# Patient Record
Sex: FEMALE | Race: WHITE | NOT HISPANIC OR LATINO | Employment: OTHER | URBAN - NONMETROPOLITAN AREA
[De-identification: names, ages, dates, MRNs, and addresses within clinical notes are randomized per-mention and may not be internally consistent; named-entity substitution may affect disease eponyms.]

---

## 2021-08-05 ENCOUNTER — APPOINTMENT (EMERGENCY)
Dept: RADIOLOGY | Facility: HOSPITAL | Age: 86
DRG: 689 | End: 2021-08-05
Payer: COMMERCIAL

## 2021-08-05 ENCOUNTER — HOSPITAL ENCOUNTER (INPATIENT)
Facility: HOSPITAL | Age: 86
LOS: 6 days | Discharge: HOME HEALTH | DRG: 689 | End: 2021-08-11
Attending: EMERGENCY MEDICINE | Admitting: INTERNAL MEDICINE
Payer: COMMERCIAL

## 2021-08-05 ENCOUNTER — APPOINTMENT (EMERGENCY)
Dept: CT IMAGING | Facility: HOSPITAL | Age: 86
DRG: 689 | End: 2021-08-05
Payer: COMMERCIAL

## 2021-08-05 DIAGNOSIS — N39.0 UTI (URINARY TRACT INFECTION): ICD-10-CM

## 2021-08-05 DIAGNOSIS — E43 SEVERE PROTEIN-CALORIE MALNUTRITION (HCC): ICD-10-CM

## 2021-08-05 DIAGNOSIS — G20 PARKINSON'S DISEASE (HCC): ICD-10-CM

## 2021-08-05 DIAGNOSIS — K59.01 SLOW TRANSIT CONSTIPATION: ICD-10-CM

## 2021-08-05 DIAGNOSIS — I48.91 NEW ONSET ATRIAL FIBRILLATION (HCC): ICD-10-CM

## 2021-08-05 DIAGNOSIS — R11.10 INTRACTABLE VOMITING: ICD-10-CM

## 2021-08-05 DIAGNOSIS — R53.1 GENERALIZED WEAKNESS: Primary | ICD-10-CM

## 2021-08-05 DIAGNOSIS — E44.1 MILD PROTEIN-CALORIE MALNUTRITION (HCC): ICD-10-CM

## 2021-08-05 DIAGNOSIS — I48.91 ATRIAL FIBRILLATION WITH RVR (HCC): ICD-10-CM

## 2021-08-05 PROBLEM — N30.01 ACUTE CYSTITIS WITH HEMATURIA: Status: ACTIVE | Noted: 2021-08-05

## 2021-08-05 PROBLEM — Z95.2 HISTORY OF TRANSCATHETER AORTIC VALVE REPLACEMENT (TAVR): Status: ACTIVE | Noted: 2021-08-05

## 2021-08-05 PROBLEM — D64.9 CHRONIC ANEMIA: Status: ACTIVE | Noted: 2021-08-05

## 2021-08-05 PROBLEM — K44.9 HIATAL HERNIA: Status: ACTIVE | Noted: 2021-08-05

## 2021-08-05 PROBLEM — E87.1 HYPONATREMIA: Status: ACTIVE | Noted: 2021-08-05

## 2021-08-05 LAB
ALBUMIN SERPL BCP-MCNC: 3.9 G/DL (ref 3.5–5)
ALP SERPL-CCNC: 119 U/L (ref 46–116)
ALT SERPL W P-5'-P-CCNC: 9 U/L (ref 12–78)
ANION GAP SERPL CALCULATED.3IONS-SCNC: 7 MMOL/L (ref 4–13)
APTT PPP: 28 SECONDS (ref 23–37)
AST SERPL W P-5'-P-CCNC: 50 U/L (ref 5–45)
BACTERIA UR QL AUTO: ABNORMAL /HPF
BASOPHILS # BLD AUTO: 0.03 THOUSANDS/ΜL (ref 0–0.1)
BASOPHILS NFR BLD AUTO: 1 % (ref 0–1)
BILIRUB SERPL-MCNC: 1.95 MG/DL (ref 0.2–1)
BILIRUB UR QL STRIP: ABNORMAL
BUN SERPL-MCNC: 15 MG/DL (ref 5–25)
CALCIUM SERPL-MCNC: 8.6 MG/DL (ref 8.3–10.1)
CHLORIDE SERPL-SCNC: 100 MMOL/L (ref 100–108)
CLARITY UR: ABNORMAL
CO2 SERPL-SCNC: 27 MMOL/L (ref 21–32)
COLOR UR: ABNORMAL
CREAT SERPL-MCNC: 1.05 MG/DL (ref 0.6–1.3)
EOSINOPHIL # BLD AUTO: 0.05 THOUSAND/ΜL (ref 0–0.61)
EOSINOPHIL NFR BLD AUTO: 1 % (ref 0–6)
ERYTHROCYTE [DISTWIDTH] IN BLOOD BY AUTOMATED COUNT: 21.2 % (ref 11.6–15.1)
GFR SERPL CREATININE-BSD FRML MDRD: 48 ML/MIN/1.73SQ M
GLUCOSE SERPL-MCNC: 87 MG/DL (ref 65–140)
GLUCOSE UR STRIP-MCNC: NEGATIVE MG/DL
HCT VFR BLD AUTO: 28.4 % (ref 34.8–46.1)
HGB BLD-MCNC: 9.1 G/DL (ref 11.5–15.4)
HGB UR QL STRIP.AUTO: ABNORMAL
HYALINE CASTS #/AREA URNS LPF: ABNORMAL /LPF
IMM GRANULOCYTES # BLD AUTO: 0.04 THOUSAND/UL (ref 0–0.2)
IMM GRANULOCYTES NFR BLD AUTO: 1 % (ref 0–2)
INR PPP: 1.01 (ref 0.84–1.19)
KETONES UR STRIP-MCNC: ABNORMAL MG/DL
LACTATE SERPL-SCNC: 1 MMOL/L (ref 0.5–2)
LEUKOCYTE ESTERASE UR QL STRIP: ABNORMAL
LYMPHOCYTES # BLD AUTO: 0.66 THOUSANDS/ΜL (ref 0.6–4.47)
LYMPHOCYTES NFR BLD AUTO: 11 % (ref 14–44)
MAGNESIUM SERPL-MCNC: 2.2 MG/DL (ref 1.6–2.6)
MCH RBC QN AUTO: 31 PG (ref 26.8–34.3)
MCHC RBC AUTO-ENTMCNC: 32 G/DL (ref 31.4–37.4)
MCV RBC AUTO: 97 FL (ref 82–98)
MONOCYTES # BLD AUTO: 0.43 THOUSAND/ΜL (ref 0.17–1.22)
MONOCYTES NFR BLD AUTO: 7 % (ref 4–12)
NEUTROPHILS # BLD AUTO: 4.59 THOUSANDS/ΜL (ref 1.85–7.62)
NEUTS SEG NFR BLD AUTO: 79 % (ref 43–75)
NITRITE UR QL STRIP: NEGATIVE
NON-SQ EPI CELLS URNS QL MICRO: ABNORMAL /HPF
NRBC BLD AUTO-RTO: 0 /100 WBCS
PH UR STRIP.AUTO: 6 [PH]
PLATELET # BLD AUTO: 129 THOUSANDS/UL (ref 149–390)
POTASSIUM SERPL-SCNC: 4.3 MMOL/L (ref 3.5–5.3)
PROT SERPL-MCNC: 6.6 G/DL (ref 6.4–8.2)
PROT UR STRIP-MCNC: ABNORMAL MG/DL
PROTHROMBIN TIME: 13.1 SECONDS (ref 11.6–14.5)
RBC # BLD AUTO: 2.94 MILLION/UL (ref 3.81–5.12)
RBC #/AREA URNS AUTO: ABNORMAL /HPF
SODIUM SERPL-SCNC: 134 MMOL/L (ref 136–145)
SP GR UR STRIP.AUTO: 1.01 (ref 1–1.03)
TROPONIN I SERPL-MCNC: <0.02 NG/ML
UROBILINOGEN UR QL STRIP.AUTO: 0.2 E.U./DL
WBC # BLD AUTO: 5.8 THOUSAND/UL (ref 4.31–10.16)
WBC #/AREA URNS AUTO: ABNORMAL /HPF

## 2021-08-05 PROCEDURE — 96375 TX/PRO/DX INJ NEW DRUG ADDON: CPT

## 2021-08-05 PROCEDURE — 87040 BLOOD CULTURE FOR BACTERIA: CPT | Performed by: PHYSICIAN ASSISTANT

## 2021-08-05 PROCEDURE — 83605 ASSAY OF LACTIC ACID: CPT | Performed by: PHYSICIAN ASSISTANT

## 2021-08-05 PROCEDURE — 85025 COMPLETE CBC W/AUTO DIFF WBC: CPT | Performed by: PHYSICIAN ASSISTANT

## 2021-08-05 PROCEDURE — 84484 ASSAY OF TROPONIN QUANT: CPT | Performed by: PHYSICIAN ASSISTANT

## 2021-08-05 PROCEDURE — 85730 THROMBOPLASTIN TIME PARTIAL: CPT | Performed by: PHYSICIAN ASSISTANT

## 2021-08-05 PROCEDURE — 71045 X-RAY EXAM CHEST 1 VIEW: CPT

## 2021-08-05 PROCEDURE — 99223 1ST HOSP IP/OBS HIGH 75: CPT | Performed by: NURSE PRACTITIONER

## 2021-08-05 PROCEDURE — 99285 EMERGENCY DEPT VISIT HI MDM: CPT

## 2021-08-05 PROCEDURE — 74177 CT ABD & PELVIS W/CONTRAST: CPT

## 2021-08-05 PROCEDURE — 96365 THER/PROPH/DIAG IV INF INIT: CPT

## 2021-08-05 PROCEDURE — 80053 COMPREHEN METABOLIC PANEL: CPT | Performed by: PHYSICIAN ASSISTANT

## 2021-08-05 PROCEDURE — 85610 PROTHROMBIN TIME: CPT | Performed by: PHYSICIAN ASSISTANT

## 2021-08-05 PROCEDURE — 83735 ASSAY OF MAGNESIUM: CPT | Performed by: PHYSICIAN ASSISTANT

## 2021-08-05 PROCEDURE — 84145 PROCALCITONIN (PCT): CPT | Performed by: PHYSICIAN ASSISTANT

## 2021-08-05 PROCEDURE — 99285 EMERGENCY DEPT VISIT HI MDM: CPT | Performed by: PHYSICIAN ASSISTANT

## 2021-08-05 PROCEDURE — 36415 COLL VENOUS BLD VENIPUNCTURE: CPT | Performed by: PHYSICIAN ASSISTANT

## 2021-08-05 PROCEDURE — 81001 URINALYSIS AUTO W/SCOPE: CPT | Performed by: PHYSICIAN ASSISTANT

## 2021-08-05 PROCEDURE — 96361 HYDRATE IV INFUSION ADD-ON: CPT

## 2021-08-05 PROCEDURE — 87086 URINE CULTURE/COLONY COUNT: CPT | Performed by: PHYSICIAN ASSISTANT

## 2021-08-05 RX ORDER — ASPIRIN 81 MG/1
81 TABLET ORAL DAILY
Status: DISCONTINUED | OUTPATIENT
Start: 2021-08-06 | End: 2021-08-11 | Stop reason: HOSPADM

## 2021-08-05 RX ORDER — ATORVASTATIN CALCIUM 40 MG/1
80 TABLET, FILM COATED ORAL
Status: DISCONTINUED | OUTPATIENT
Start: 2021-08-06 | End: 2021-08-11 | Stop reason: HOSPADM

## 2021-08-05 RX ORDER — POLYETHYLENE GLYCOL 3350 17 G/17G
17 POWDER, FOR SOLUTION ORAL DAILY
COMMUNITY

## 2021-08-05 RX ORDER — SODIUM CHLORIDE 9 MG/ML
50 INJECTION, SOLUTION INTRAVENOUS CONTINUOUS
Status: DISCONTINUED | OUTPATIENT
Start: 2021-08-05 | End: 2021-08-10

## 2021-08-05 RX ORDER — ACETAMINOPHEN 325 MG/1
650 TABLET ORAL EVERY 6 HOURS PRN
Status: DISCONTINUED | OUTPATIENT
Start: 2021-08-05 | End: 2021-08-11 | Stop reason: HOSPADM

## 2021-08-05 RX ORDER — ONDANSETRON 2 MG/ML
4 INJECTION INTRAMUSCULAR; INTRAVENOUS EVERY 8 HOURS PRN
Status: DISCONTINUED | OUTPATIENT
Start: 2021-08-05 | End: 2021-08-07

## 2021-08-05 RX ORDER — MELATONIN
1000 DAILY
COMMUNITY
End: 2021-09-14 | Stop reason: HOSPADM

## 2021-08-05 RX ORDER — ATORVASTATIN CALCIUM 80 MG/1
80 TABLET, FILM COATED ORAL DAILY
COMMUNITY
End: 2021-09-14 | Stop reason: HOSPADM

## 2021-08-05 RX ORDER — SAFINAMIDE MESYLATE 100 MG/1
TABLET, FILM COATED ORAL
COMMUNITY
Start: 2021-03-16 | End: 2021-08-11 | Stop reason: HOSPADM

## 2021-08-05 RX ORDER — CEFEPIME HYDROCHLORIDE 2 G/50ML
2000 INJECTION, SOLUTION INTRAVENOUS ONCE
Status: COMPLETED | OUTPATIENT
Start: 2021-08-05 | End: 2021-08-05

## 2021-08-05 RX ORDER — LANOLIN ALCOHOL/MO/W.PET/CERES
1000 CREAM (GRAM) TOPICAL DAILY
COMMUNITY
End: 2021-09-14 | Stop reason: HOSPADM

## 2021-08-05 RX ORDER — PANTOPRAZOLE SODIUM 40 MG/1
40 TABLET, DELAYED RELEASE ORAL DAILY
COMMUNITY

## 2021-08-05 RX ORDER — SELEGILINE HYDROCHLORIDE 5 MG/1
5 TABLET ORAL 2 TIMES DAILY WITH MEALS
Status: DISCONTINUED | OUTPATIENT
Start: 2021-08-06 | End: 2021-08-06

## 2021-08-05 RX ORDER — CLOPIDOGREL BISULFATE 75 MG/1
75 TABLET ORAL
COMMUNITY
Start: 2021-07-05 | End: 2021-09-03

## 2021-08-05 RX ORDER — CARBIDOPA AND LEVODOPA 50; 200 MG/1; MG/1
1 TABLET, EXTENDED RELEASE ORAL 4 TIMES DAILY
Status: DISCONTINUED | OUTPATIENT
Start: 2021-08-06 | End: 2021-08-06

## 2021-08-05 RX ORDER — ASPIRIN 81 MG/1
81 TABLET ORAL DAILY
COMMUNITY

## 2021-08-05 RX ORDER — CEFEPIME HYDROCHLORIDE 1 G/50ML
1000 INJECTION, SOLUTION INTRAVENOUS EVERY 12 HOURS
Status: DISCONTINUED | OUTPATIENT
Start: 2021-08-06 | End: 2021-08-09

## 2021-08-05 RX ORDER — CARBIDOPA AND LEVODOPA 50; 200 MG/1; MG/1
1 TABLET, EXTENDED RELEASE ORAL
COMMUNITY

## 2021-08-05 RX ORDER — ONDANSETRON 2 MG/ML
4 INJECTION INTRAMUSCULAR; INTRAVENOUS ONCE
Status: COMPLETED | OUTPATIENT
Start: 2021-08-05 | End: 2021-08-05

## 2021-08-05 RX ORDER — PANTOPRAZOLE SODIUM 40 MG/1
40 TABLET, DELAYED RELEASE ORAL
Status: DISCONTINUED | OUTPATIENT
Start: 2021-08-06 | End: 2021-08-11 | Stop reason: HOSPADM

## 2021-08-05 RX ORDER — CEFEPIME HYDROCHLORIDE 2 G/50ML
2000 INJECTION, SOLUTION INTRAVENOUS ONCE
Status: DISCONTINUED | OUTPATIENT
Start: 2021-08-06 | End: 2021-08-05

## 2021-08-05 RX ORDER — HEPARIN SODIUM 5000 [USP'U]/ML
5000 INJECTION, SOLUTION INTRAVENOUS; SUBCUTANEOUS EVERY 8 HOURS SCHEDULED
Status: DISCONTINUED | OUTPATIENT
Start: 2021-08-05 | End: 2021-08-10

## 2021-08-05 RX ORDER — CLOPIDOGREL BISULFATE 75 MG/1
75 TABLET ORAL DAILY
Status: DISCONTINUED | OUTPATIENT
Start: 2021-08-06 | End: 2021-08-11 | Stop reason: HOSPADM

## 2021-08-05 RX ADMIN — HEPARIN SODIUM 5000 UNITS: 5000 INJECTION INTRAVENOUS; SUBCUTANEOUS at 22:40

## 2021-08-05 RX ADMIN — IOHEXOL 85 ML: 350 INJECTION, SOLUTION INTRAVENOUS at 19:11

## 2021-08-05 RX ADMIN — SODIUM CHLORIDE 1000 ML: 0.9 INJECTION, SOLUTION INTRAVENOUS at 18:26

## 2021-08-05 RX ADMIN — ONDANSETRON 4 MG: 2 INJECTION INTRAMUSCULAR; INTRAVENOUS at 22:40

## 2021-08-05 RX ADMIN — CEFEPIME HYDROCHLORIDE 2000 MG: 2 INJECTION, SOLUTION INTRAVENOUS at 18:31

## 2021-08-05 RX ADMIN — ONDANSETRON 4 MG: 2 INJECTION INTRAMUSCULAR; INTRAVENOUS at 18:29

## 2021-08-05 RX ADMIN — SODIUM CHLORIDE 50 ML/HR: 0.9 INJECTION, SOLUTION INTRAVENOUS at 22:46

## 2021-08-05 RX ADMIN — SODIUM CHLORIDE 1000 ML: 0.9 INJECTION, SOLUTION INTRAVENOUS at 19:20

## 2021-08-05 NOTE — ED PROVIDER NOTES
History  Chief Complaint   Patient presents with    Weakness - Generalized     Pt dx with UTI, was taking antibiotic but pt reports it made them vomit so they stopped taking it      The patient is an 51-year-old female with a past medical history of  GERD, Parkinson's disease, HTN,severe AS status post TAVR performed on 6/29 with subsequent development of right femoral artery occlusion on 7/2 on plavix, CAD, atrial flutter, hypercholesterolemia, who presents emergency department today due to increased generalized weakness, dysuria, nausea and vomiting  Patient was diagnosed with a urinary tract infection by [facility] in [place] on 07/21 and was placed on Ceftin  Patient states that she was to complete a 7 day course of this however followed up the next day with her primary care physician and still had urinary tract infection  She was then started on ciprofloxacin she was to complete 10 days of this however  This approximately 4 days ago because she was unable tolerate it causing nausea vomiting     She states she was given Zofran but was still having repetitive nausea /vomiting  Patient did return to that ER however waited 6 hours and was not seen therefore came here for further evaluation  She states she is having increased dysuria, frequency  She feels very weak overall, daughter escorted her here to the ER and feels that this is secondary to her dehydration and vomiting            Difficulty Urinating  Pain quality:  Burning  Pain severity:  Moderate  Onset quality:  Gradual  Timing:  Constant  Progression:  Worsening  Chronicity:  New  Recent urinary tract infections: yes    Relieved by:  Nothing  Worsened by:  Nothing  Ineffective treatments:  Antibiotics  Urinary symptoms: frequent urination    Urinary symptoms: no discolored urine, no foul-smelling urine, no hematuria and no bladder incontinence    Associated symptoms: abdominal pain, nausea and vomiting    Associated symptoms: no fever, no flank pain, no genital lesions and no vaginal discharge    Abdominal pain:     Location:  Suprapubic    Quality: burning      Severity:  Mild    Onset quality:  Gradual    Timing:  Constant    Progression:  Unchanged    Chronicity:  New  Nausea:     Severity:  Moderate    Onset quality:  Gradual    Timing:  Constant    Progression:  Waxing and waning  Vomiting:     Quality:  Undigested food and stomach contents    Severity:  Mild    Timing:  Constant    Progression:  Unchanged      None       Past Medical History:   Diagnosis Date    DVT (deep venous thrombosis) (HCC)     MI (myocardial infarction) ([facility] [address] )        Past Surgical History:   Procedure Laterality Date    BREAST SURGERY      HYSTERECTOMY      TUBAL LIGATION         History reviewed  No pertinent family history  I have reviewed and agree with the history as documented  E-Cigarette/Vaping    E-Cigarette Use Never User      E-Cigarette/Vaping Substances     Social History     Tobacco Use    Smoking status: Never Smoker    Smokeless tobacco: Never Used   Vaping Use    Vaping Use: Never used   Substance Use Topics    Alcohol use: Not Currently    Drug use: Not Currently       Review of Systems   Constitutional: Negative for chills and fever  HENT: Negative for ear pain and sore throat  Eyes: Negative for pain and visual disturbance  Respiratory: Negative for cough and shortness of breath  Cardiovascular: Negative for chest pain and palpitations  Gastrointestinal: Positive for abdominal pain, nausea and vomiting  Genitourinary: Positive for dysuria  Negative for flank pain, hematuria and vaginal discharge  Musculoskeletal: Negative for arthralgias and back pain  Skin: Negative for color change and rash  Neurological: Negative for seizures and syncope  All other systems reviewed and are negative  Physical Exam  Physical Exam  Vitals and nursing note reviewed  Constitutional:       General: She is not in acute distress  Appearance: She is well-developed  HENT:      Head: Normocephalic and atraumatic  Mouth/Throat:      Mouth: Mucous membranes are dry  Eyes:      Extraocular Movements: Extraocular movements intact  Conjunctiva/sclera: Conjunctivae normal       Pupils: Pupils are equal, round, and reactive to light  Cardiovascular:      Rate and Rhythm: Normal rate and regular rhythm  Pulses: Normal pulses  Heart sounds: No murmur heard  Pulmonary:      Effort: Pulmonary effort is normal  No respiratory distress  Breath sounds: Normal breath sounds  Abdominal:      Palpations: Abdomen is soft  Tenderness: There is abdominal tenderness  There is no right CVA tenderness or left CVA tenderness  Musculoskeletal:      Cervical back: Neck supple  Right lower leg: No edema  Left lower leg: No edema  Skin:     General: Skin is warm and dry  Capillary Refill: Capillary refill takes less than 2 seconds  Neurological:      General: No focal deficit present  Mental Status: She is alert and oriented to person, place, and time           Vital Signs  ED Triage Vitals [08/05/21 1801]   Temperature Pulse Respirations Blood Pressure SpO2   97 8 °F (36 6 °C) 75 20 154/73 100 %      Temp Source Heart Rate Source Patient Position - Orthostatic VS BP Location FiO2 (%)   Temporal Monitor -- Right arm --      Pain Score       3           Vitals:    08/05/21 1801   BP: 154/73   Pulse: 75         Visual Acuity      ED Medications  Medications   sodium chloride 0 9 % bolus 1,000 mL (1,000 mL Intravenous New Bag 8/5/21 1826)     Followed by   sodium chloride 0 9 % bolus 1,000 mL (1,000 mL Intravenous New Bag 8/5/21 1920)   cefepime (MAXIPIME) IVPB (premix in dextrose) 2,000 mg 50 mL (0 mg Intravenous Stopped 8/5/21 1906)   ondansetron (ZOFRAN) injection 4 mg (4 mg Intravenous Given 8/5/21 1829)   iohexol (OMNIPAQUE) 350 MG/ML injection (SINGLE-DOSE) 85 mL (85 mL Intravenous Given 8/5/21 1911) Diagnostic Studies  Results Reviewed     Procedure Component Value Units Date/Time    Urine Microscopic [[id number]]  (Abnormal) Collected: 08/05/21 1844    Lab Status: Final result Specimen: Urine, Clean Catch Updated: 08/05/21 1905     RBC, UA 0-5 /hpf      WBC, UA 10-20 /hpf      Epithelial Cells None Seen /hpf      Bacteria, UA Moderate /hpf      Hyaline Casts, UA 1-2 /lpf     Urine culture [[id number]] Collected: 08/05/21 1844    Lab Status: In process Specimen: Urine, Clean Catch Updated: 08/05/21 1905    UA w Reflex to Microscopic w Reflex to Culture [[id number]]  (Abnormal) Collected: 08/05/21 1844    Lab Status: Final result Specimen: Urine, Clean Catch Updated: 08/05/21 1853     Color, UA Lisy     Clarity, UA Cloudy     Specific Gravity, UA 1 015     pH, UA 6 0     Leukocytes, UA Trace     Nitrite, UA Negative     Protein, UA Trace mg/dl      Glucose, UA Negative mg/dl      Ketones, UA Trace mg/dl      Urobilinogen, UA 0 2 E U /dl      Bilirubin, UA Small     Blood, UA Trace-lysed    Troponin I [[id number]]  (Normal) Collected: 08/05/21 1822    Lab Status: Final result Specimen: Blood from Arm, Left Updated: 08/05/21 1851     Troponin I <0 02 ng/mL     Lactic acid [[id number]]  (Normal) Collected: 08/05/21 1822    Lab Status: Final result Specimen: Blood from Arm, Left Updated: 08/05/21 1849     LACTIC ACID 1 0 mmol/L     Narrative:      Result may be elevated if tourniquet was used during collection      Comprehensive metabolic panel [[id number]]  (Abnormal) Collected: 08/05/21 1822    Lab Status: Final result Specimen: Blood from Arm, Left Updated: 08/05/21 1847     Sodium 134 mmol/L      Potassium 4 3 mmol/L      Chloride 100 mmol/L      CO2 27 mmol/L      ANION GAP 7 mmol/L      BUN 15 mg/dL      Creatinine 1 05 mg/dL      Glucose 87 mg/dL      Calcium 8 6 mg/dL      AST 50 U/L      ALT 9 U/L      Alkaline Phosphatase 119 U/L      Total Protein 6 6 g/dL      Albumin 3 9 g/dL      Total Bilirubin 1 95 mg/dL      eGFR 48 ml/min/1 73sq m     Narrative:      [place] guidelines for Chronic Kidney Disease (CKD):     Stage 1 with normal or high GFR (GFR > 90 mL/min/1 73 square meters)    Stage 2 Mild CKD (GFR = 60-89 mL/min/1 73 square meters)    Stage 3A Moderate CKD (GFR = 45-59 mL/min/1 73 square meters)    Stage 3B Moderate CKD (GFR = 30-44 mL/min/1 73 square meters)    Stage 4 Severe CKD (GFR = 15-29 mL/min/1 73 square meters)    Stage 5 End Stage CKD (GFR <15 mL/min/1 73 square meters)  Note: GFR calculation is accurate only with a steady state creatinine    Magnesium [[id number]]  (Normal) Collected: 08/05/21 1822    Lab Status: Final result Specimen: Blood from Arm, Left Updated: 08/05/21 1847     Magnesium 2 2 mg/dL     Protime-INR [[id number]]  (Normal) Collected: 08/05/21 1822    Lab Status: Final result Specimen: Blood from Arm, Left Updated: 08/05/21 1842     Protime 13 1 seconds      INR 1 01    APTT [[id number]]  (Normal) Collected: 08/05/21 1822    Lab Status: Final result Specimen: Blood from Arm, Left Updated: 08/05/21 1842     PTT 28 seconds     CBC and differential [[id number]]  (Abnormal) Collected: 08/05/21 1822    Lab Status: Final result Specimen: Blood from Arm, Left Updated: 08/05/21 1830     WBC 5 80 Thousand/uL      RBC 2 94 Million/uL      Hemoglobin 9 1 g/dL      Hematocrit 28 4 %      MCV 97 fL      MCH 31 0 pg      MCHC 32 0 g/dL      RDW 21 2 %      Platelets 669 Thousands/uL      nRBC 0 /100 WBCs      Neutrophils Relative 79 %      Immat GRANS % 1 %      Lymphocytes Relative 11 %      Monocytes Relative 7 %      Eosinophils Relative 1 %      Basophils Relative 1 %      Neutrophils Absolute 4 59 Thousands/µL      Immature Grans Absolute 0 04 Thousand/uL      Lymphocytes Absolute 0 66 Thousands/µL      Monocytes Absolute 0 43 Thousand/µL      Eosinophils Absolute 0 05 Thousand/µL      Basophils Absolute 0 03 Thousands/µL     Procalcitonin with AM Reflex [[id number]] Collected: 08/05/21 1822    Lab Status: In process Specimen: Blood from Arm, Left Updated: 08/05/21 1826    Blood culture #1 [[id number]] Collected: 08/05/21 1822    Lab Status: In process Specimen: Blood from Arm, Left Updated: 08/05/21 1826    Blood culture #2 [[id number]] Collected: 08/05/21 1822    Lab Status: In process Specimen: Blood from Hand, Left Updated: 08/05/21 1826                 CT abdomen pelvis with contrast   Final Result by Jolanta Rodriguez MD (08/05 2011)   No acute inflammatory stranding   No bowel obstruction   There is small amount of free fluid in the pelvis  ,  Likely corresponds to abnormality described at CT from July 2, 2021 performed at [facility]   No abnormal bowel wall thickening, or pneumatosis or free air   Large hiatal hernia      No new intra-abdominal hematoma   I personally discussed this study with Laquita HUFF on 8/5/2021 at 8:10 PM                   Workstation performed: [id number]         XR chest portable    (Results Pending)              Procedures  Procedures         ED Course  ED Course as of Aug 05 2020   Thu Aug 05, 2021   2004 UA consistent with urinary tract infection  Culture was sent  2013 IMPRESSION:  No acute inflammatory stranding  No bowel obstruction  There is small amount of free fluid in the pelvis  ,  Likely corresponds to abnormality described at CT from July 2, 2021 performed at [facility]  No abnormal bowel wall thickening, or pneumatosis or free air  Large hiatal hernia     No new intra-abdominal hematoma  I personally discussed this study with Bud Aguilar on 8/5/2021 at 8:10 PM          2014 Reaching out to hospitalist service for admission secondary to persistent UTI despite several courses of antibiotics                                                MDM  Number of Diagnoses or Management Options  Generalized weakness  UTI (urinary tract infection)  Diagnosis management comments: Patient's lab work was relatively unremarkable UA consistent with infection  Patient's CT scan was unremarkable for any acute findings  Patient was given IV fluids and did have blood cultures obtained  Discussed with hospitalist service for admission secondary to persistent UTI symptoms and UTI despite several courses of antibiotics  Dr Dee Rosales accepted        Amount and/or Complexity of Data Reviewed  Clinical lab tests: ordered and reviewed  Tests in the radiology section of CPT®: ordered and reviewed  Decide to obtain previous medical records or to obtain history from someone other than the patient: yes  Review and summarize past medical records: yes  Discuss the patient with other providers: yes  Independent visualization of images, tracings, or specimens: yes    Risk of Complications, Morbidity, and/or Mortality  Presenting problems: moderate  Diagnostic procedures: moderate  Management options: moderate        Disposition  Final diagnoses:   Generalized weakness   UTI (urinary tract infection)     Time reflects when diagnosis was documented in both MDM as applicable and the Disposition within this note     Time User Action Codes Description Comment    8/5/2021  8:01 PM Rosemary Watts Add [R53 1] Generalized weakness     8/5/2021  8:01 PM Rosemary Watts Add [N39 0] UTI (urinary tract infection)       ED Disposition     ED Disposition Condition Date/Time Comment    Admit Stable Thu Aug 5, 2021  8:11 PM Case was discussed with Jese Wynn  and the patient's admission status was agreed to be Admission Status: inpatient status to the service of Dr Елена Bell           Follow-up Information    None         Patient's Medications    No medications on file     No discharge procedures on file      PDMP Review     None          ED Provider  Electronically Signed by           Dyan Terrazas PA-C  08/05/21 2020

## 2021-08-06 PROBLEM — D69.6 THROMBOCYTOPENIA (HCC): Status: ACTIVE | Noted: 2021-08-06

## 2021-08-06 PROBLEM — E43 SEVERE PROTEIN-CALORIE MALNUTRITION (HCC): Status: ACTIVE | Noted: 2021-08-06

## 2021-08-06 PROBLEM — N30.00 ACUTE CYSTITIS WITHOUT HEMATURIA: Status: ACTIVE | Noted: 2021-08-05

## 2021-08-06 LAB
ANION GAP SERPL CALCULATED.3IONS-SCNC: 9 MMOL/L (ref 4–13)
BUN SERPL-MCNC: 13 MG/DL (ref 5–25)
CALCIUM SERPL-MCNC: 7.6 MG/DL (ref 8.3–10.1)
CHLORIDE SERPL-SCNC: 106 MMOL/L (ref 100–108)
CO2 SERPL-SCNC: 25 MMOL/L (ref 21–32)
CREAT SERPL-MCNC: 0.84 MG/DL (ref 0.6–1.3)
ERYTHROCYTE [DISTWIDTH] IN BLOOD BY AUTOMATED COUNT: 21.7 % (ref 11.6–15.1)
GFR SERPL CREATININE-BSD FRML MDRD: 63 ML/MIN/1.73SQ M
GLUCOSE SERPL-MCNC: 69 MG/DL (ref 65–140)
HCT VFR BLD AUTO: 23.1 % (ref 34.8–46.1)
HGB BLD-MCNC: 7.5 G/DL (ref 11.5–15.4)
MCH RBC QN AUTO: 31.6 PG (ref 26.8–34.3)
MCHC RBC AUTO-ENTMCNC: 32.5 G/DL (ref 31.4–37.4)
MCV RBC AUTO: 98 FL (ref 82–98)
PLATELET # BLD AUTO: 90 THOUSANDS/UL (ref 149–390)
POTASSIUM SERPL-SCNC: 4 MMOL/L (ref 3.5–5.3)
PROCALCITONIN SERPL-MCNC: <0.05 NG/ML
PROCALCITONIN SERPL-MCNC: <0.05 NG/ML
RBC # BLD AUTO: 2.37 MILLION/UL (ref 3.81–5.12)
SODIUM SERPL-SCNC: 140 MMOL/L (ref 136–145)
WBC # BLD AUTO: 3.73 THOUSAND/UL (ref 4.31–10.16)

## 2021-08-06 PROCEDURE — 99232 SBSQ HOSP IP/OBS MODERATE 35: CPT | Performed by: INTERNAL MEDICINE

## 2021-08-06 PROCEDURE — 84145 PROCALCITONIN (PCT): CPT | Performed by: PHYSICIAN ASSISTANT

## 2021-08-06 PROCEDURE — 97163 PT EVAL HIGH COMPLEX 45 MIN: CPT

## 2021-08-06 PROCEDURE — 97167 OT EVAL HIGH COMPLEX 60 MIN: CPT

## 2021-08-06 PROCEDURE — 80048 BASIC METABOLIC PNL TOTAL CA: CPT | Performed by: NURSE PRACTITIONER

## 2021-08-06 PROCEDURE — 85027 COMPLETE CBC AUTOMATED: CPT | Performed by: NURSE PRACTITIONER

## 2021-08-06 PROCEDURE — 97116 GAIT TRAINING THERAPY: CPT

## 2021-08-06 RX ORDER — CARBIDOPA AND LEVODOPA 50; 200 MG/1; MG/1
1 TABLET, EXTENDED RELEASE ORAL 4 TIMES DAILY
Status: DISCONTINUED | OUTPATIENT
Start: 2021-08-06 | End: 2021-08-11 | Stop reason: HOSPADM

## 2021-08-06 RX ORDER — CARBIDOPA AND LEVODOPA 50; 200 MG/1; MG/1
1 TABLET, EXTENDED RELEASE ORAL
Status: DISCONTINUED | OUTPATIENT
Start: 2021-08-07 | End: 2021-08-11 | Stop reason: HOSPADM

## 2021-08-06 RX ORDER — SAFINAMIDE MESYLATE 100 MG/1
100 TABLET, FILM COATED ORAL DAILY
COMMUNITY
End: 2021-09-14 | Stop reason: HOSPADM

## 2021-08-06 RX ORDER — AMOXICILLIN 250 MG
1 CAPSULE ORAL
Status: DISCONTINUED | OUTPATIENT
Start: 2021-08-06 | End: 2021-08-11 | Stop reason: HOSPADM

## 2021-08-06 RX ADMIN — PANTOPRAZOLE SODIUM 40 MG: 40 TABLET, DELAYED RELEASE ORAL at 05:51

## 2021-08-06 RX ADMIN — CARBIDOPA AND LEVODOPA 1 TABLET: 50; 200 TABLET, EXTENDED RELEASE ORAL at 13:52

## 2021-08-06 RX ADMIN — CARBIDOPA AND LEVODOPA 1 TABLET: 50; 200 TABLET, EXTENDED RELEASE ORAL at 01:29

## 2021-08-06 RX ADMIN — SAFINAMIDE MESYLATE 100 MG: 100 TABLET, FILM COATED ORAL at 11:55

## 2021-08-06 RX ADMIN — CARBIDOPA AND LEVODOPA 1 TABLET: 50; 200 TABLET, EXTENDED RELEASE ORAL at 22:00

## 2021-08-06 RX ADMIN — HEPARIN SODIUM 5000 UNITS: 5000 INJECTION INTRAVENOUS; SUBCUTANEOUS at 22:00

## 2021-08-06 RX ADMIN — CLOPIDOGREL BISULFATE 75 MG: 75 TABLET ORAL at 08:00

## 2021-08-06 RX ADMIN — ONDANSETRON 4 MG: 2 INJECTION INTRAMUSCULAR; INTRAVENOUS at 11:58

## 2021-08-06 RX ADMIN — CEFEPIME HYDROCHLORIDE 1000 MG: 1 INJECTION, SOLUTION INTRAVENOUS at 05:51

## 2021-08-06 RX ADMIN — HEPARIN SODIUM 5000 UNITS: 5000 INJECTION INTRAVENOUS; SUBCUTANEOUS at 05:51

## 2021-08-06 RX ADMIN — SODIUM CHLORIDE 50 ML/HR: 0.9 INJECTION, SOLUTION INTRAVENOUS at 16:18

## 2021-08-06 RX ADMIN — CEFEPIME HYDROCHLORIDE 1000 MG: 1 INJECTION, SOLUTION INTRAVENOUS at 17:20

## 2021-08-06 RX ADMIN — HEPARIN SODIUM 5000 UNITS: 5000 INJECTION INTRAVENOUS; SUBCUTANEOUS at 13:52

## 2021-08-06 RX ADMIN — DOCUSATE SODIUM AND SENNOSIDES 1 TABLET: 8.6; 5 TABLET ORAL at 22:00

## 2021-08-06 RX ADMIN — SELEGILINE HYDROCHLORIDE 5 MG: 5 TABLET ORAL at 08:00

## 2021-08-06 RX ADMIN — ONDANSETRON 4 MG: 2 INJECTION INTRAMUSCULAR; INTRAVENOUS at 20:22

## 2021-08-06 RX ADMIN — ASPIRIN 81 MG: 81 TABLET, COATED ORAL at 08:00

## 2021-08-06 RX ADMIN — ATORVASTATIN CALCIUM 80 MG: 40 TABLET, FILM COATED ORAL at 17:20

## 2021-08-06 RX ADMIN — CARBIDOPA AND LEVODOPA 1 TABLET: 50; 200 TABLET, EXTENDED RELEASE ORAL at 18:11

## 2021-08-06 RX ADMIN — CARBIDOPA AND LEVODOPA 1 TABLET: 50; 200 TABLET, EXTENDED RELEASE ORAL at 06:00

## 2021-08-06 NOTE — PHYSICAL THERAPY NOTE
PHYSICAL THERAPY EVALUATION  NAME:  Shane Messer  DATE: 08/06/21    AGE:   80 y o  Mrn:   [id number]  ADMIT DX:  UTI (urinary tract infection) [N39 0]  Weakness [R53 1]  Generalized weakness [R53 1]    Past Medical History:   Diagnosis Date    DVT (deep venous thrombosis) (HCC)     MI (myocardial infarction) ([facility] [address] )      Length Of Stay: 1  Performed at least 2 patient identifiers during session: Name and Birthday  PHYSICAL THERAPY EVALUATION :      08/06/21 1115   PT Last Visit   PT Visit Date 08/06/21   Note Type   Note type Evaluation   Pain Assessment   Pain Assessment Tool 0-10   Pain Score No Pain   Home Living   Type of Home House  (1 JOSHUA)   Home Layout One level;Performs ADLs on one level; Able to live on main level with bedroom/bathroom   Bathroom Shower/Tub Tub/shower unit   Bathroom Toilet Standard   Bathroom Equipment Shower chair;Grab bars in shower;Grab bars around toilet   [address]  (doesn't use, but has it sitting in bathroom at sink to sit)   Additional Comments Reports living in a 1 SH with 1 JOSHUA and not using an AD PTA as she just kept her rollator in the bathroom to use to sit on while she "brushed my teeth"   Prior Function   Level of [place] Independent with ADLs and functional mobility   Lives With Alone   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)   ADL Assistance Independent   IADLs Independent   Falls in the last 6 months 0   Comments Reports being independent PTA without AD  independent for ADLs and IADLs  Restrictions/Precautions   Other Precautions Chair Alarm; Bed Alarm; Fall Risk;Multiple lines   General   Additional Pertinent History Pt is s/p TAVR on 6/21/2021 and is s/p Endarterectomy for right femoral occlusion 07/02/2021      Cognition   Orientation Level Oriented X4   Following Commands Follows one step commands without difficulty   RLE Assessment   RLE Assessment WFL  (3+/5)   LLE Assessment   LLE Assessment WFL  (3+/5)   Coordination   Movements are Fluid and Coordinated 1   Light Touch   RLE Light Touch Grossly intact   LLE Light Touch Grossly intact   Bed Mobility   Supine to Sit 6  Modified independent   Additional items Increased time required   Additional Comments HOB flat without bedrail  completed bed mobility modified independent with increased time   Transfers   Sit to Stand   (stand by assistance)   Additional items Increased time required;Verbal cues   Stand to Sit   (stand by assistance)   Additional items Increased time required;Verbal cues   Stand pivot 4  Minimal assistance   Additional items Assist x 1; Increased time required;Verbal cues   Additional Comments no AD  sit<>stand with stand by assistance with increased time and slight posterior lean  min cues for technique  spt without AD with minAx1 with pt reaching for external support and manaul cues for balance and wt shifting   Ambulation/Elevation   Gait pattern Narrow JUAN A; Excessively slow; Short stride   Gait Assistance 4  Minimal assist  (min to steadying assistance)   Additional items Assist x 1;Verbal cues   Assistive Device None   Distance 14'x1 without AD with min to steadying assistance with NBOS, decreased step length, foot clearance and pt reaching for external support  min cues for increased step length  Balance   Static Sitting Good   Dynamic Sitting Fair +   Static Standing Fair   Dynamic Standing Fair -   Ambulatory Poor +   Activity Tolerance   Activity Tolerance Patient limited by fatigue   Medical Staff Made Aware Kristin ANGELO   Nurse Made Aware RN, Tiff   Assessment   Prognosis Good   Problem List Decreased strength;Decreased endurance; Impaired balance;Decreased mobility; Decreased safety awareness   Barriers to Discharge Decreased caregiver support   Barriers to Discharge Comments lives alone, but her sons live on either side of her and can help prn     Goals   Patient Goals "Go home"   STG Expiration Date 08/16/21   PT Treatment Day 1   Plan   Treatment/Interventions Functional transfer training;LE strengthening/ROM; Elevations; Therapeutic exercise; Endurance training;Patient/family training;Equipment eval/education; Bed mobility;Gait training; Compensatory technique education;Spoke to nursing;Spoke to case management;OT   PT Frequency Other (Comment)  (3-5x/week)   Recommendation   PT Discharge Recommendation Home with home health rehabilitation   Equipment Recommended   (walker-pt has)   PT - OK to Discharge   (when medically cleared)   Additional Comments pt sitting in recliner chair at end of session with all needs within reach and posey alarm on   AM-PAC Basic Mobility Inpatient   Turning in Bed Without Bedrails 4   Lying on Back to Sitting on Edge of Flat Bed 4   Moving Bed to Chair 3   Standing Up From Chair 3   Walk in Room 3   Climb 3-5 Stairs 3   Basic Mobility Inpatient Raw Score 20   Basic Mobility Standardized Score 43 99     (Please find full objective findings from PT assessment regarding body systems outlined above)  Assessment: Pt is a 80 y o  female seen for PT evaluation s/p admission to [address] [place] [address] on 8/5/2021 with Acute cystitis with hematuria  Order placed for PT services  Upon evaluation: Pt is presenting with impaired functional mobility due to decreased strength, decreased endurance, impaired balance, gait deviations, decreased safety awareness and fall risk requiring stand by to minimal assistance for transfers and minimal to steadying assistance for ambulation with out AD   Pt's clinical presentation is currently unpredictable given the functional mobility deficits above, especially weakness, decreased endurance, gait deviations, decreased activity tolerance, decreased functional mobility tolerance and decreased safety awareness, coupled with fall risks as indicated by AM-PAC 6-Clicks: 83/52 as well as impaired balance, polypharmacy and decreased safety awareness and combined with medical complications of abnormal WBCs, abnormal sodium values, multiple readmissions, fear/retreat, need for input for mobility technique/safety and abnormal H and H with hemoglobin decreased from 9 1 yesterday to 7 5 this date, abnormal urinalysis, increased c/o nausea  Pt's PMHx and comorbidities that may affect physical performance and progress include: Parkinson's Disease, CAD and h/o PE, DVT, 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 and endarterectomy for right femoral occlusion 7/2/2021, acute cystitis, chronic anemia, h/o MI  Personal factors affecting pt at time of IE include: step(s) to enter environment, limited home support, advanced age, inability to perform IADLs, inability to perform ADLs, inability to navigate level surfaces without external assistance and inability to ambulate household distances  Pt will benefit from continued skilled PT services to address deficits as defined above and to maximize level of functional mobility to facilitate return toward PLOF and improved QOL  From PT/mobility standpoint, recommendation at time of d/c would be Home PT, home with family support and with walker pending progress in order to reduce fall risk and maximize pt's functional independence and consistency with mobility in order to facilitate return to PLOF  Recommend trial with walker next 1-2 sessions and ther ex next 1-2 sessions  The patient's AM-PAC Basic Mobility Inpatient Short Form Raw Score is 20, Standardized Score is 43 99  A standardized score greater than 42 9 suggests the patient may benefit from discharge to home  Please also refer to the recommendation of the Physical Therapist for safe discharge planning  Goals: Pt will: Perform bed mobility tasks with independent to reposition in bed and prepare for transfers  Pt will perform transfers with modified I to decrease burden of care and decrease risk for falls and prepare for ambulation   Pt will ambulate with LRAD for >/= 250' with  modified I  to decrease burden of care, decrease risk for falls, improve activity tolerance and improve gait quality and to access home environment  Pt will complete >/= 8 steps with with bilateral handrails with Supervision to improve activity tolerance and complete 1 step with LRAD modified independent to access home environment  Pt will demonstrate decreased fall risk as indicated by score of >/= 10/12 on 4 item DGI  Pt will increase B LE strength >/= 1/2 MMT grade to facilitate functional mobility  Jaz Robles, PT,DPT     PHYSICAL THERAPY TREATMENT NOTE  Time In:1127  Time PCB:5881  Total Time: 8'      S:  "I'd rather use a walker "  O:    Initiated use of RW  Sit<>stand with RW with stand by assistance with increased time and min verbal cues for hand placement as patient initially attempted to stand wth poor hand placement with lateral deviation to left requiring minAx1 and verbal cues for safety to return to sitting  With improved hand placement, completed with stand by assistance    Spt with RW with stand by assistance  Min cues for turning completely with RW prior to sitting  Dynamic standing balance without UE support with stand by assistance    Ambulated 110'x1 with RW with stand by assistance with slow fabiola, decreased step length, foot clearance and verbal cues for increased step length  4 Item Dynamic Gait Index with RW   2/3 Gait level surface  0/3 Change in gait speed  2/3 Gait with horizontal head turns  2/3 Gait with vertical head turns  6/12 total score (less than 10/12 indicates increased risk of falls in elderly)    Exercises    Side/Reps/sets   Heelslides    Glute Sets    Hip Abduction    Hip Adduction    Hip Flexion 1x10 AROm B/L   Knee AROM Short Arc Quad    Ankle Pumps 1x10 AROM B/L   Quad sets    Long Arc Quads 1x10 AROM B/L   Hamstring Stretch    Heel Cord Stretch    Education      Declined stair trial      A:  Pt requires minimal verbal instruction or tactile feedback to perform exercises with proper form and technique   She was able to ambulate with decreased assistance with use of RW compared to no AD  She was able to ambulate increased distance  However, patient is at an increased fall risk as indicated by score of 6/12 on 4 item DGI, unable to change gait speed and increased path deviation with head movements  She is limited by decreased balance, strength, endurance  She will continue to benefit from PT services to maximize LOF  P:  Recommend LE strengthening, dynamic standing balance, endurance training, transfer and gait training with LRAD and stairs for endurance       Yimi Hernandez, PT, DPT

## 2021-08-06 NOTE — ASSESSMENT & PLAN NOTE
· Chronic anemia hemoglobin; did have drop in hemoglobin  · Recheck in a m      Results from last 7 days   Lab Units 08/06/21  0549 08/05/21  1822   HEMOGLOBIN g/dL 7 5* 9 1*

## 2021-08-06 NOTE — ASSESSMENT & PLAN NOTE
· [facility] 6[facility]212021 [facility]  for treatment of acute on chronic diastolic heart failure  · Endarterectomy for right femoral occlusion 07/02/2021  · CT abdomen pelvis:  No new intra-abdominal hematoma  · Continue aspirin and Plavix  · Continue outpatient follow-up No

## 2021-08-06 NOTE — ASSESSMENT & PLAN NOTE
· Baseline weakness-left leg weakness  · Continue Sinemet  · Takes Xadago-this would be formulary substituted with Selegiline-per patient's daughter-she is okay with this for a few doses, but will bring in patient's home meds-she initially gave will was identified as Sinemet by pharmacy  · Supportive care  · Fall precautions

## 2021-08-06 NOTE — ASSESSMENT & PLAN NOTE
· Hemoglobin 9 1    This appears to be around her baseline  · Per record review, she received 2 units of PRBCs in mid July for unclear reason-hemoglobin was 10 2 at that time  · Daily CBC and trend hemoglobin

## 2021-08-06 NOTE — PLAN OF CARE
Problem: PHYSICAL THERAPY ADULT  Goal: Performs mobility at highest level of function for planned discharge setting  See evaluation for individualized goals  Description: Treatment/Interventions: Functional transfer training, LE strengthening/ROM, Elevations, Therapeutic exercise, Endurance training, Patient/family training, Equipment eval/education, Bed mobility, Gait training, Compensatory technique education, Spoke to nursing, Spoke to case management, OT  Equipment Recommended:  (walker-pt has)       See flowsheet documentation for full assessment, interventions and recommendations  3/3/4491 1390 by Seven Lo PT  Note: Prognosis: Good  Problem List: Decreased strength, Decreased endurance, Impaired balance, Decreased mobility, Decreased safety awareness   Pt requires minimal verbal instruction or tactile feedback to perform exercises with proper form and technique  She was able to ambulate with decreased assistance with use of RW compared to no AD  She was able to ambulate increased distance  However, patient is at an increased fall risk as indicated by score of 6/12 on 4 item DGI, unable to change gait speed and increased path deviation with head movements  She is limited by decreased balance, strength, endurance  She will continue to benefit from PT services to maximize LOF  Barriers to Discharge: Decreased caregiver support  Barriers to Discharge Comments: lives alone, but her sons live on either side of her and can help prn  PT Discharge Recommendation: Home with home health rehabilitation     PT - OK to Discharge:  (when medically cleared)    See flowsheet documentation for full assessment

## 2021-08-06 NOTE — ASSESSMENT & PLAN NOTE
· Acute UTI with generalized malaise  Could not tolerate ciprofloxacin as prescribed on outpatient with nausea/vomiting  · Follow-up on urine culture  Continue cefepime for now    · PT/OT recommending rehab

## 2021-08-06 NOTE — PLAN OF CARE
Problem: Potential for Falls  Goal: Patient will remain free of falls  Description: INTERVENTIONS:  - Educate patient/family on patient safety including physical limitations  - Instruct patient to call for assistance with activity   - Consult OT/PT to assist with strengthening/mobility   - Keep Call bell within reach  - Keep bed low and locked with side rails adjusted as appropriate  - Keep care items and personal belongings within reach  - Initiate and maintain comfort rounds  - Make Fall Risk Sign visible to staff  - Offer Toileting every 4 Hours, in advance of need  - Initiate/Maintain bed alarm  - Obtain necessary fall risk management equipment: bracelet and socks  - Apply yellow socks and bracelet for high fall risk patients  - Consider moving patient to room near nurses station  Outcome: Progressing     Problem: Prexisting or High Potential for Compromised Skin Integrity  Goal: Skin integrity is maintained or improved  Description: INTERVENTIONS:  - Identify patients at risk for skin breakdown  - Assess and monitor skin integrity  - Assess and monitor nutrition and hydration status  - Monitor labs   - Assess for incontinence   - Assist with mobility/ambulation  - Relieve pressure over bony prominences  - Avoid friction and shearing  - Provide appropriate hygiene as needed including keeping skin clean and dry  - Evaluate need for skin moisturizer/barrier cream  - Collaborate with interdisciplinary team   - Patient/family teaching     Outcome: Progressing     Problem: Nutrition/Hydration-ADULT  Goal: Nutrient/Hydration intake appropriate for improving, restoring or maintaining nutritional needs  Description: Monitor and assess patient's nutrition/hydration status for malnutrition  Collaborate with interdisciplinary team and initiate plan and interventions as ordered  Monitor patient's weight and dietary intake as ordered or per policy  Utilize nutrition screening tool and intervene as necessary   Determine patient's food preferences and provide high-protein, high-caloric foods as appropriate       INTERVENTIONS:  - Monitor oral intake, urinary output, labs, and treatment plans  - Assess nutrition and hydration status and recommend course of action  - Evaluate amount of meals eaten  - Allow adequate time for meals  - Recommend/ encourage appropriate diets, oral nutritional supplements, and vitamin/mineral supplements  - Order, calculate, and assess calorie counts as needed  - Assess need for intravenous fluids  - Provide specific nutrition/hydration education as appropriate  - Include patient/family/caregiver in decisions related to nutrition  Outcome: Progressing     Problem: PAIN - ADULT  Goal: Verbalizes/displays adequate comfort level or baseline comfort level  Description: Interventions:  - Encourage patient to monitor pain and request assistance  - Assess pain using appropriate pain scale  - Administer analgesics based on type and severity of pain and evaluate response  - Implement non-pharmacological measures as appropriate and evaluate response  - Consider cultural and social influences on pain and pain management  - Notify physician/advanced practitioner if interventions unsuccessful or patient reports new pain  Outcome: Progressing     Problem: INFECTION - ADULT  Goal: Absence or prevention of progression during hospitalization  Description: INTERVENTIONS:  - Assess and monitor for signs and symptoms of infection  - Monitor lab/diagnostic results  - Monitor all insertion sites  - Administer medications as ordered  - Instruct and encourage patient and family to use good hand hygiene technique  - Identify and instruct in appropriate isolation precautions for identified infection/condition  Outcome: Progressing     Problem: SAFETY ADULT  Goal: Patient will remain free of falls  Description: INTERVENTIONS:  - Educate patient/family on patient safety including physical limitations  - Instruct patient to call for assistance with activity   - Consult OT/PT to assist with strengthening/mobility   - Keep Call bell within reach  - Keep bed low and locked with side rails adjusted as appropriate  - Keep care items and personal belongings within reach  - Initiate and maintain comfort rounds  - Make Fall Risk Sign visible to staff  - Offer Toileting every 4 Hours, in advance of need  - Initiate/Maintain bed alarm  - Obtain necessary fall risk management equipment: bracelet and socks  - Apply yellow socks and bracelet for high fall risk patients  - Consider moving patient to room near nurses station  Outcome: Progressing  Goal: Maintain or return to baseline ADL function  Description: INTERVENTIONS:  -  Assess patient's ability to carry out ADLs; assess patient's baseline for ADL function and identify physical deficits which impact ability to perform ADLs (bathing, care of mouth/teeth, toileting, grooming, dressing, etc )  - Assess/evaluate cause of self-care deficits   - Assess range of motion  - Assess patient's mobility; develop plan if impaired  - Assess patient's need for assistive devices and provide as appropriate  - Encourage maximum independence but intervene and supervise when necessary  - Involve family in performance of ADLs  - Assess for home care needs following discharge   - Provide patient education as appropriate  Outcome: Progressing  Goal: Maintains/Returns to pre admission functional level  Description: INTERVENTIONS:  - Perform BMAT or MOVE assessment daily    - Set and communicate daily mobility goal to care team and patient/family/caregiver  - Collaborate with rehabilitation services on mobility goals if consulted  - Perform Range of Motion 4 times a day  - Reposition patient every 4 hours    - Dangle patient 3 times a day  - Stand patient 3 times a day  - Out of bed for toileting  - Record patient progress and toleration of activity level   Outcome: Progressing     Problem: DISCHARGE PLANNING  Goal: Discharge to home or other facility with appropriate resources  Description: INTERVENTIONS:  - Identify barriers to discharge w/patient and caregiver  - Arrange for needed discharge resources and transportation as appropriate  - Identify discharge learning needs (meds, wound care, etc )  - Arrange for interpretive services to assist at discharge as needed  - Refer to Case Management Department for coordinating discharge planning if the patient needs post-hospital services based on physician/advanced practitioner order or complex needs related to functional status, cognitive ability, or social support system  Outcome: Progressing     Problem: Knowledge Deficit  Goal: Patient/family/caregiver demonstrates understanding of disease process, treatment plan, medications, and discharge instructions  Description: Complete learning assessment and assess knowledge base    Interventions:  - Provide teaching at level of understanding  - Provide teaching via preferred learning methods  Outcome: Progressing     Problem: DISCHARGE PLANNING - CARE MANAGEMENT  Goal: Discharge to post-acute care or home with appropriate resources  Description: INTERVENTIONS:  - Conduct assessment to determine patient/family and health care team treatment goals, and need for post-acute services based on payer coverage, community resources, and patient preferences, and barriers to discharge  - Address psychosocial, clinical, and financial barriers to discharge as identified in assessment in conjunction with the patient/family and health care team  - Arrange appropriate level of post-acute services according to patient's   needs and preference and payer coverage in collaboration with the physician and health care team  - Communicate with and update the patient/family, physician, and health care team regarding progress on the discharge plan  - Arrange appropriate transportation to post-acute venues  Outcome: Progressing

## 2021-08-06 NOTE — PLAN OF CARE
Problem: OCCUPATIONAL THERAPY ADULT  Goal: Performs self-care activities at highest level of function for planned discharge setting  See evaluation for individualized goals  Description:      See flowsheet documentation for full assessment, interventions and recommendations  Note: Limitation: Decreased ADL status, Decreased endurance, Decreased self-care trans, Decreased high-level ADLs  Prognosis: Good  Assessment: Pt is a 80 y o  F, admitted to [address] [place] [address] 8/5/2021 d/t experiencing generalized weakness  Dx: acute cystitis with hematuria  Pt with PMHx impacting their performance during ADL tasks, including: DVT and MI  On evaluation, pt presented supine in bed and was A&Ox4  Pt reports living alone in a one-level home and performing ADL/IADL tasks @ I  Pt completing supine>sit EOB @ Mod I  UB ADLs @ supervision, after set-up  LB dressing @ supervision  Pt able to don socks, provided set-up, while seated at EOB  Pt maintaining good sitting balance while EOB  Pt completing STS from EOB @ SBA  Pt standing while maintaining fair balance with no AD  Toilet transfer @ steadying assist with RW  Toileting @ supervision  Grooming @ supervision  Pt able to wash hands at sink with supervision with 1UE support on RW  Pt's barriers to d/c include: decrease activity tolerance, decrease standing balance, decrease generalized strength, decrease activity engagement and decrease performance during functional transfers   Pt would benefit from continued acute OT services to address deficits as well as home health OT services upon d/c from hospital      OT Discharge Recommendation: Home with home health rehabilitation

## 2021-08-06 NOTE — ASSESSMENT & PLAN NOTE
· Presents with generalized weakness  · Diagnosed with urinary tract infection last week but could not tolerate Cipro due to vomiting  · Urinalysis today positive for leukocyte esterase, WBC, moderate bacteria  · CT abdomen pelvis:  No acute inflammatory stranding  No bowel obstruction  Small amount of free fluid in pelvis likely corresponding to abnormality described at CT from July 2, 2021 performed at [facility]    No abnormal bowel wall thickening, or pneumatosis or free air  · Urine culture is pending  · Cefepime was started in the ER-continue

## 2021-08-06 NOTE — ASSESSMENT & PLAN NOTE
Malnutrition Findings:   Adult Malnutrition type: Acute illness (related to condition as evidenced by < 50% energy intake > 5 days, severe muscle loss to pectoralis, deltoid and interroseous muscles  Severe fat loss to orbitals )  Adult Degree of Malnutrition: Other severe protein calorie malnutrition (Treated with: Dental soft, 2 gm Na diet  +Ensure compact BID  Consider ST evaluation  Recommend daily weights )  BMI Findings:  Adult BMI Classifications: Underweight < 18 5  Body mass index is 17 37 kg/m²

## 2021-08-06 NOTE — ASSESSMENT & PLAN NOTE
· CT abdomen pelvis:  Large hiatal hernia  · Head of bed up  · Continue Protonix  · Outpatient GI follow-up

## 2021-08-06 NOTE — CONSULTS
Consult received for malnutrition  Pt with reported poor intake since initiated on ABx, RN reports this was > 2 weeks now  Pt says she has been having N/V as well as swallowing difficulties with both liquids and solids  Said she had difficulties swallowing toast and cranberry juice at B this AM though unable to describe difficulties further  Noting wt decrease per chart review though nonsignificant  Physical exam noting severe muscle loss to pectoralis, deltoid and interroseous muscles  Severe fat loss to orbitals  Meets criteria for malnutrition  Consider ST evaluation given reported swallowing difficulties, will order dental soft diet which was agreeable by pt  Will d/c fat restriction given malnutrition  Will order ensure compact BID  Recommend daily weights  Will follow up

## 2021-08-06 NOTE — ASSESSMENT & PLAN NOTE
· Sodium 134  · She received 2 L normal saline in the ER  · Normal saline at 50 mL an hour  · Recheck metabolic panel and trend sodium

## 2021-08-06 NOTE — ASSESSMENT & PLAN NOTE
· Very mild thrombocytopenia likely secondary to acute infection    No evidence of bleeding    Results from last 7 days   Lab Units 08/06/21  0549 08/05/21  1822   PLATELETS Thousands/uL 90* 129*

## 2021-08-06 NOTE — ASSESSMENT & PLAN NOTE
Malnutrition Findings:           BMI Findings: Body mass index is 17 83 kg/m²       · As evidenced by BMI 17 6, insufficient protein calorie intake, weight loss, muscle wasting  · Nutrition consult

## 2021-08-06 NOTE — PLAN OF CARE
Problem: PHYSICAL THERAPY ADULT  Goal: Performs mobility at highest level of function for planned discharge setting  See evaluation for individualized goals  Description: Treatment/Interventions: Functional transfer training, LE strengthening/ROM, Elevations, Therapeutic exercise, Endurance training, Patient/family training, Equipment eval/education, Bed mobility, Gait training, Compensatory technique education, Spoke to nursing, Spoke to case management, OT  Equipment Recommended:  (walker-pt has)       See flowsheet documentation for full assessment, interventions and recommendations  Note: Prognosis: Good  Problem List: Decreased strength, Decreased endurance, Impaired balance, Decreased mobility, Decreased safety awareness  Assessment: Pt is a 80 y o  female seen for PT evaluation s/p admission to [facility] on 8/5/2021 with Acute cystitis with hematuria  Order placed for PT services  Upon evaluation: Pt is presenting with impaired functional mobility due to decreased strength, decreased endurance, impaired balance, gait deviations, decreased safety awareness and fall risk requiring stand by to minimal assistance for transfers and minimal to steadying assistance for ambulation with out AD  Pt's clinical presentation is currently unpredictable given the functional mobility deficits above, especially weakness, decreased endurance, gait deviations, decreased activity tolerance, decreased functional mobility tolerance and decreased safety awareness, coupled with fall risks as indicated by AM-PAC 6-Clicks: 00/81 as well as impaired balance, polypharmacy and decreased safety awareness and combined with medical complications of abnormal WBCs, abnormal sodium values, multiple readmissions, fear/retreat, need for input for mobility technique/safety and abnormal H and H with hemoglobin decreased from 9 1 yesterday to 7 5 this date, abnormal urinalysis, increased c/o nausea    Pt's PMHx and comorbidities that may affect physical performance and progress include: Parkinson's Disease, CAD and h/o PE, DVT, 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 and endarterectomy for right femoral occlusion 7/2/2021, acute cystitis, chronic anemia, h/o MI  Personal factors affecting pt at time of IE include: step(s) to enter environment, limited home support, advanced age, inability to perform IADLs, inability to perform ADLs, inability to navigate level surfaces without external assistance and inability to ambulate household distances  Pt will benefit from continued skilled PT services to address deficits as defined above and to maximize level of functional mobility to facilitate return toward PLOF and improved QOL  From PT/mobility standpoint, recommendation at time of d/c would be Home PT, home with family support and with walker pending progress in order to reduce fall risk and maximize pt's functional independence and consistency with mobility in order to facilitate return to PLOF  Recommend trial with walker next 1-2 sessions and ther ex next 1-2 sessions  Barriers to Discharge: Decreased caregiver support  Barriers to Discharge Comments: lives alone, but her sons live on either side of her and can help prn  PT Discharge Recommendation: Home with home health rehabilitation     PT - OK to Discharge:  (when medically cleared)    See flowsheet documentation for full assessment

## 2021-08-06 NOTE — OCCUPATIONAL THERAPY NOTE
Occupational Therapy Evaluation     Patient Name: Josefina CARMONA Date: 8/6/2021  Problem List  Principal Problem:    Acute cystitis with hematuria  Active Problems:    Chronic anemia    History of transcatheter aortic valve replacement (TAVR)    Hyponatremia    Parkinson's disease ([facility] )    Hiatal hernia    Mild protein-calorie malnutrition (HCC)    Thrombocytopenia (HCC)    Severe protein-calorie malnutrition ([facility] )    Past Medical History  Past Medical History:   Diagnosis Date    DVT (deep venous thrombosis) ([facility] )     MI (myocardial infarction) ([facility] )      Past Surgical History  Past Surgical History:   Procedure Laterality Date    BREAST SURGERY      HYSTERECTOMY      TUBAL LIGATION             08/06/21 1116   Note Type   Note type Evaluation   Restrictions/Precautions   Other Precautions Chair Alarm; Bed Alarm;Multiple lines; Fall Risk   Pain Assessment   Pain Assessment Tool 0-10   Pain Score No Pain   Home Living   Type of Home House  (1 JOSHUA)   Home Layout One level;Performs ADLs on one level; Able to live on main level with bedroom/bathroom   Bathroom Shower/Tub Tub/shower unit   Bathroom Toilet Standard   Bathroom Equipment Shower chair;Grab bars in shower;Grab bars around toilet   [facility]   Additional Comments Pt reports living alone in one-level home  No AD use @ baseline, but reports has a walker in her bathroom if needed  Prior Function   Level of [place] Independent with ADLs and functional mobility   Lives With Alone   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)   ADL Assistance Independent   IADLs Independent   Falls in the last 6 months 0   ADL   Where Assessed Edge of bed   Grooming Assistance 5  Supervision/Setup   Grooming Deficit Setup;Verbal cueing;Supervision/safety; Increased time to complete   UB Dressing Assistance 5  Supervision/Setup   UB Dressing Deficit Setup;Verbal cueing;Supervision/safety; Increased time to complete   LB Dressing Assistance 5  Supervision/Setup   LB Dressing Deficit Setup; Requires assistive device for steadying;Verbal cueing;Supervision/safety; Increased time to complete   Toileting Assistance  5  Supervision/Setup   Toileting Deficit Setup;Verbal cueing;Supervison/safety; Increased time to complete   Additional Comments UB ADLs @ supervision, provided set-up  LB ADLs @ supervision, provided set-up  Pt able to don socks while seated at EOB, provided set-up  Toileting @ supervision  Pt able to complet pericare and CM with supervision  Grooming @ supervision  Pt able to wash hands while standing at sink with supervision and 1UE support on RW  Bed Mobility   Supine to Sit 6  Modified independent   Additional items Increased time required   Additional Comments Pt presented seated supine in bed at beginning of session  Mod I to transfer from supine to sit  Transfers   Sit to Stand   (SBA)   Additional items Increased time required;Verbal cues   Stand to Sit   (SBA)   Additional items Increased time required;Verbal cues   Stand pivot   (SBA)   Additional items Increased time required;Verbal cues   Toilet transfer   (Steadying assist)   Additional items Increased time required;Verbal cues   Additional Comments Pt able to complete STS transfer from EOB with no AD @ SBA  Pt able to ambulate into bathroom onto toilet with SBA  Pt then given RW and was able to complete toilet transfer with steadying assist  Able to ambulate to sink to wash hands and to recliner chiar with RW and supervision  Seated in recliner chair at end of session  Chair alarm intact  Call bell within reach  All needs met at this time     Balance   Static Sitting Good   Dynamic Sitting Fair +   Static Standing Fair   Dynamic Standing Fair -   Activity Tolerance   Activity Tolerance Patient limited by fatigue   Medical Staff Made Aware Spoke with [place] QURESHI   Nurse Made Aware Spoke with RNVanessa Assessment   RUE Assessment WFL   LUE Assessment LUE Assessment WFL   Hand Function   Gross Motor Coordination Functional   Fine Motor Coordination Functional   Sensation   Light Touch No apparent deficits   Cognition   Overall Cognitive Status WFL   Arousal/Participation Alert; Responsive; Cooperative   Attention Within functional limits   Orientation Level Oriented X4   Memory Within functional limits   Following Commands Follows one step commands with increased time or repetition   Assessment   Limitation Decreased ADL status; Decreased endurance;Decreased self-care trans;Decreased high-level ADLs   Prognosis Good   Assessment Pt is a 80 y o  F, admitted to [address] [place] [address] 8/5/2021 d/t experiencing generalized weakness  Dx: acute cystitis with hematuria  Pt with PMHx impacting their performance during ADL tasks, including: DVT and MI  On evaluation, pt presented supine in bed and was A&Ox4  Pt reports living alone in a one-level home and performing ADL/IADL tasks @ I  Pt completing supine>sit EOB @ Mod I  UB ADLs @ supervision, after set-up  LB dressing @ supervision  Pt able to don socks, provided set-up, while seated at EOB  Pt maintaining good sitting balance while EOB  Pt completing STS from EOB @ SBA  Pt standing while maintaining fair balance with no AD  Toilet transfer @ steadying assist with RW  Toileting @ supervision  Grooming @ supervision  Pt able to wash hands at sink with supervision with 1UE support on RW  Pt's barriers to d/c include: decrease activity tolerance, decrease standing balance, decrease generalized strength, decrease activity engagement and decrease performance during functional transfers   Pt would benefit from continued acute OT services to address deficits as well as home health OT services upon d/c from hospital    Recommendation   OT Discharge Recommendation Home with home health rehabilitation   AM-PAC Daily Activity Inpatient   Lower Body Dressing 3   Bathing 3   Toileting 3   Upper Body Dressing 3   Grooming 3   Eating 4   Daily Activity Raw Score 19   Daily Activity Standardized Score (Calc for Raw Score >=11) 40 22   AM-PAC Applied Cognition Inpatient   Following a Speech/Presentation 4   Understanding Ordinary Conversation 4   Taking Medications 4   Remembering Where Things Are Placed or Put Away 4   Remembering List of 4-5 Errands 3   Taking Care of Complicated Tasks 3   Applied Cognition Raw Score 22   Applied Cognition Standardized Score 47 83     The patient's raw score on the AM-PAC Daily Activity inpatient short form is 19, standardized score is 40 22, greater than 39 4  Patients at this level are likely to benefit from DC to home  Please refer to the recommendation of the Occupational Therapist for safe DC planning  Pt goals to be met by 8/16/2021    1  Pt will demonstrate ability to complete grooming/hygiene tasks @ Mod I after set-up  2  Pt will demonstrate ability to complete supine<>sit @ I in order to increase safety and independence during ADL tasks  3  Pt will demonstrate ability to complete UB ADLs including washing/dressing @ Mod I in order to increase performance and participation during meaningful tasks  4  Pt will demonstrate ability to complete LB dressing @ Mod I in order to increase safety and independence during meaningful tasks  5  Pt will demonstrate ability to alfredo/doff socks/shoes while sitting EOB @ Mod I in order to increase safety and independence during meaningful tasks  6  Pt will demonstrate ability to complete toileting tasks including CM and pericare @ Mod I in order to increase safety and independence during meaningful tasks  7  Pt will demonstrate ability to complete EOB, chair, toilet/commode transfers @ Mod I in order to increase performance and participation during functional tasks  8  Pt will demonstrate ability to stand for 10 minutes while maintaining good balance with use of least restrictive device for UB support PRN    9  Pt will demonstrate ability to tolerate 30-35 minute OT session with no vc'ing for deep breathing or use of energy conservation techniques in order to increase activity tolerance during functional tasks  10  Pt will demonstrate Good carryover of use of energy conservation/compensatory strategies during ADLs and functional tasks in order to increase safety and reduce risk for falls  11  Pt will demonstrate Good attention and participation in continued evaluation of functional ambulation house hold distances in order to assist with safe d/c planning  12  Pt will identify 3 areas of interest/hobbies and 1 intervention on how to incorporate into daily life in order to increase interaction with environment and peers as well as increase participation in meaningful tasks       Tj Julio, OTR/L

## 2021-08-06 NOTE — UTILIZATION REVIEW
Initial Clinical Review    Admission: Date/Time/Statement:   Admission Orders (From admission, onward)     Ordered        08/05/21 2019  Inpatient Admission  Once                   Orders Placed This Encounter   Procedures    Inpatient Admission     Standing Status:   Standing     Number of Occurrences:   1     Order Specific Question:   Level of Care     Answer:   Med Surg [16]     Order Specific Question:   Estimated length of stay     Answer:   More than 2 Midnights     Order Specific Question:   Certification     Answer:   I certify that inpatient services are medically necessary for this patient for a duration of greater than two midnights  See H&P and MD Progress Notes for additional information about the patient's course of treatment  ED Arrival Information     Expected Arrival Acuity    - 8/5/2021 17:52 Urgent         Means of arrival Escorted by Service Admission type    Wheelchair Self Hospitalist Urgent         Arrival complaint    Weakness, some COVID symps-thinks UTI        Chief Complaint   Patient presents with    Weakness - Generalized     Pt dx with UTI, was taking antibiotic but pt reports it made them vomit so they stopped taking it        Initial Presentation: 80year old female to the ED from home with complaints of weakness, nausea, vomiting  Admitted to inpatient for acute cystitis with hematuria, parkinsons, thrombocytopenia  Diagnosed with UTI on 7/21, started on Ceftin, then changed to CIpro which she was unable to tolerated due to vomiting  Has dysuria and frequency  Arrives with abdominal tenderness, dry mucus membranes  CT A/P shows: No acute inflammatory stranding  No bowel obstruction  Small amount of free fluid in pelvis likely corresponding to abnormality described at CT from July 2, 2021 performed at [facility]  No abnormal bowel wall thickening, or pneumatosis or free air  hGB 9 1 which appears to be near her baseline  Continue with daily CBCs and trend hgb    Given IV fluids in the ED  UA positive for leukocyte esterase, WBC, moderate bacteria  Urine culture pending  IV abx started in the ED  Date: 8/6   Day 2:    Continue with IV abx  Follow up urine cultures  Remains weaknened  PT/OT recommend STR  ED Triage Vitals   Temperature Pulse Respirations Blood Pressure SpO2   08/05/21 1801 08/05/21 1801 08/05/21 1801 08/05/21 1801 08/05/21 1801   97 8 °F (36 6 °C) 75 20 154/73 100 %      Temp Source Heart Rate Source Patient Position - Orthostatic VS BP Location FiO2 (%)   08/05/21 1801 08/05/21 1801 08/05/21 2031 08/05/21 1801 --   Temporal Monitor Lying Right arm       Pain Score       08/05/21 1801       3          Wt Readings from Last 1 Encounters:   08/06/21 47 4 kg (104 lb 6 4 oz)     Additional Vital Signs:   Time  Temp  Pulse  Resp  BP  MAP (mmHg)  SpO2  O2 Device  Patient Position - Orthostatic VS   08/06/21 0757  --  --  --  --  --  --  None (Room air)  --   08/06/21 07:50:12  98 7 °F (37 1 °C)  61  17  104/51  69  95 %  --  --   08/05/21 2300  --  --  --  --  --  --  None (Room air)  --   08/05/21 22:44:28  98 °F (36 7 °C)  64  17  108/50  69  96 %  --  --   08/05/21 2120  --  --  --  --  --  --  None (Room air)  --   08/05/21 21:05:40  97 7 °F (36 5 °C)  --  17  168/78  108  --  --  --   08/05/21 2031  --  73  20  160/74  --  98 %  None (Room air)  Lying   08/05/21 1801  97 8 °F (36 6 °C)  75  20  154/73  105  100 %           Pertinent Labs/Diagnostic Test Results:   8/5 CT A/P: No acute inflammatory stranding   No bowel obstruction   There is small amount of free fluid in the pelvis  , Hitesh Aguillon corresponds to abnormality described at CT from July 2, 2021 performed at [facility]   No abnormal bowel wall thickening, or pneumatosis or free air   Large hiatal hernia     No new intra-abdominal hematoma   8/6 CXR:   No acute cardiopulmonary disease         Results from last 7 days   Lab Units 08/06/21  0549 08/05/21  1822   WBC Thousand/uL 3 73* 5 80   HEMOGLOBIN g/dL 7 5* 9 1* HEMATOCRIT % 23 1* 28 4*   PLATELETS Thousands/uL 90* 129*   NEUTROS ABS Thousands/µL  --  4 59         Results from last 7 days   Lab Units 08/06/21  0549 08/05/21  1822   SODIUM mmol/L 140 134*   POTASSIUM mmol/L 4 0 4 3   CHLORIDE mmol/L 106 100   CO2 mmol/L 25 27   ANION GAP mmol/L 9 7   BUN mg/dL 13 15   CREATININE mg/dL 0 84 1 05   EGFR ml/min/1 73sq m 63 48   CALCIUM mg/dL 7 6* 8 6   MAGNESIUM mg/dL  --  2 2     Results from last 7 days   Lab Units 08/05/21  1822   AST U/L 50*   ALT U/L 9*   ALK PHOS U/L 119*   TOTAL PROTEIN g/dL 6 6   ALBUMIN g/dL 3 9   TOTAL BILIRUBIN mg/dL 1 95*         Results from last 7 days   Lab Units 08/06/21  0549 08/05/21  1822   GLUCOSE RANDOM mg/dL 69 87         Results from last 7 days   Lab Units 08/05/21  1822   TROPONIN I ng/mL <0 02         Results from last 7 days   Lab Units 08/05/21  1822   PROTIME seconds 13 1   INR  1 01   PTT seconds 28         Results from last 7 days   Lab Units 08/06/21  0549 08/05/21  1822   PROCALCITONIN ng/ml <0 05 <0 05     Results from last 7 days   Lab Units 08/05/21  1822   LACTIC ACID mmol/L 1 0     Results from last 7 days   Lab Units 08/05/21  1844   CLARITY UA  Cloudy   COLOR UA  Lisy   SPEC GRAV UA  1 015   PH UA  6 0   GLUCOSE UA mg/dl Negative   KETONES UA mg/dl Trace*   BLOOD UA  Trace-lysed*   PROTEIN UA mg/dl Trace*   NITRITE UA  Negative   BILIRUBIN UA  Small*   UROBILINOGEN UA E U /dl 0 2   LEUKOCYTES UA  Trace*   WBC UA /hpf 10-20*   RBC UA /hpf 0-5   BACTERIA UA /hpf Moderate*   EPITHELIAL CELLS WET PREP /hpf None Seen     Results from last 7 days   Lab Units 08/05/21  1822   BLOOD CULTURE  Received in Microbiology Lab  Culture in Progress  Received in Microbiology Lab  Culture in Progress       ED Treatment:   Medication Administration from 08/05/2021 1747 to 08/05/2021 2101       Date/Time Order Dose Route Action     08/05/2021 1826 sodium chloride 0 9 % bolus 1,000 mL 1,000 mL Intravenous New Bag     08/05/2021 1920 sodium chloride 0 9 % bolus 1,000 mL 1,000 mL Intravenous New Bag     08/05/2021 1831 cefepime (MAXIPIME) IVPB (premix in dextrose) 2,000 mg 50 mL 2,000 mg Intravenous New Bag     08/05/2021 1829 ondansetron (ZOFRAN) injection 4 mg 4 mg Intravenous Given        Past Medical History:   Diagnosis Date    [facility] thrombosis) ([facility])     MI (myocardial infarction) ([facility] )        Admitting Diagnosis: UTI (urinary tract infection) [N39 0]  Weakness [R53 1]  Generalized weakness [R53 1]  Age/Sex: 80 y o  female  Admission Orders:  SCDS  I/O  URine culture  Scheduled Medications:  aspirin, 81 mg, Oral, Daily  atorvastatin, 80 mg, Oral, Daily With Dinner  carbidopa-levodopa, 1 tablet, Oral, 4x Daily  cefepime, 1,000 mg, Intravenous, Q12H  clopidogrel, 75 mg, Oral, Daily  heparin (porcine), 5,000 Units, Subcutaneous, Q8H SWATI  pantoprazole, 40 mg, Oral, Early Morning  Safinamide Mesylate, 100 mg, Oral, Daily      Continuous IV Infusions:  sodium chloride, 50 mL/hr, Intravenous, Continuous      PRN Meds:  acetaminophen, 650 mg, Oral, Q6H PRN  ondansetron, 4 mg, Intravenous, Q8H PRN        IP CONSULT TO CASE MANAGEMENT  IP CONSULT TO NUTRITION SERVICES    Network Utilization Review Department  ATTENTION: Please call with any questions or concerns to [phone] and carefully listen to the prompts so that you are directed to the right person  All voicemails are confidential   Asenath Drop all requests for admission clinical reviews, approved or denied determinations and any other requests to dedicated fax number below belonging to the campus where the patient is receiving treatment   List of dedicated fax numbers for the Facilities:  [address] DENIALS (Administrative/Medical Necessity) [phone]   [address] [place] [address] (Maternity/NICU/Pediatrics) 261 [facility] Floor [address] Rubin Moran [address] [place] [address] [address] Beatty 1481 [address] [place] [address] [phone]

## 2021-08-06 NOTE — CASE MANAGEMENT
Case Management Assessment & Discharge Planning Note    Patient name Angela Balbuena  Location [facility] 330/-01 MRN [id number]  : 1934 Date 2021       Current Admission Date: 2021  Current Admission Diagnosis:  Acute cystitis without hematuria   Patient Active Problem List   Diagnosis    Chronic anemia    History of transcatheter aortic valve replacement (TAVR)    Hyponatremia    Acute cystitis without hematuria    [facility]'s disease [facility] )    Hiatal hernia    Mild protein-calorie malnutrition ([facility] )    Thrombocytopenia ([facility] )    Severe protein-calorie malnutrition ([facility] )    Previous Admission - Discharge Date:    LOS (days): 1  Geometric Mean LOS (GMLOS) (days): 2 90  Days to GMLOS:2 Previous Discharge Diagnosis:  No discharge information exists for this patient  Risk of Unplanned Readmission Score  Predictive Model Details          12 (Low)  Factor Value    Calculated 2021 16:05 22% Number of active Rx orders 21    Risk of Unplanned Readmission Model 14% Latest calcium low (7 6 mg/dL)     11% Diagnosis of electrolyte disorder present     10% Age 80     10% Imaging order present in last 6 months     9% Latest hemoglobin low (7 5 g/dL)     8% Number of ED visits in last six months 1     7% Diagnosis of deficiency anemia present     7% Active anticoagulant Rx order present     1% Active ulcer medication Rx order present     1% Current length of stay 0 824 days         BUNDLE:      Reason for Referral: Other    OBJECTIVE:  Pt is a 80y o  year old , white or  [1], female with Latter-day preference of None admitted on 2021  5:54 PM  Pt is admitted to [facility] 330-[address] at [address] [place] [address] with complaints of Acute cystitis without hematuria     Current admission status: Inpatient  Referral Reason:  (Other)    Preferred Pharmacy:   [facility]/pharmacy #[id number]- [facility] [phone]  [id number]  [place]LY [place] [id number]  Phone: [phone] Fax: [phone]    Primary Care Provider: Agusto Au    Primary Insurance: [facility]  Secondary Insurance:     Clinical information-  Admit with cystitis with hematuria  IV fluids and IV antibiotics  Discussed in rounds- therapy recommendation is HHC  CM met with patient at the bedside,baseline information  was obtained  CM discussed the role of CM in helping the patient develop a discharge plan and assist the patient in carry out their plan  CM discussed [facility] and patient was not sure, however she gave me permission to call her POA's wife Rosa Isela Powerz as Vania Roper is Out of town- I called Rosa Isela [place] [phone] of recent_ 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 at [facility]  Did have endarterectomy for femoral occlusion-  Reviewed patient is having difficulties recovering from this per Rosa Isela Cardenas  CM reviewed the recommendations at this time is for [facility]  Rosa Isela Cardenas was hoping for STR, reviewed therapy notes with Supervision for ADLS, recommendations is for [facility]  Rosa Isela Cardenas is in agreement for STR, No preference for [facility] agencies  She is aware several referrals will be made to see who accepts her insurance  Per Rosa Isela Cardenas patient has a hx of cancelling care if she has to pay anything OOP  A post acute care recommendation was made by your care team for [facility]  Discussed Freedom of Choice with both patient and caregiver  List of agencies given to caregiver via in person  caregiver aware the list is custom filtered for them by zip code location and that [facility] post acute providers are designated  ASSESSMENT:  Active Health Care Agents    There are no active Health Care Agents on file         Advance Directives  Does patient have a [address]?: Yes  Does patient have Advance Directives?: Yes  Advance Directives: Living will  Was patient offered paperwork?: Yes         Janeth Casas [address] of Residence: [place]         Patient Information  Mental Status: Alert  During Assessment patient was accompanied by: Not accompanied during assessment  Assessment information provided by[de-identified] Patient  Primary Caregiver: Self  Home entry access options   Select all that apply : Ramp, Stairs  Number of steps to enter home : 1  Type of Current Residence: [facility]  Living Arrangements: Lives Alone  Is patient a ?: No    Activities of Daily Living Prior to Admission  Functional Status: Independent  Completes ADLs independently?: Yes  Ambulates independently?: Yes  Does patient use assisted devices?: Yes  Assisted Devices (DME) used:  (Rolator prn)  Does patient currently own DME?: Yes (Rolator)  Does patient have a history of Outpatient Therapy (PT/OT)?: No  Does the patient have a history of Short-Term Rehab?: No  Does patient currently have [facility]?: No         Patient Information Continued  Income Source: Pension/FCI  Does patient have prescription coverage?: Yes  Does patient receive dialysis treatments?: No  Does patient have a history of substance abuse?: No  Does patient have a history of Mental Health Diagnosis?: No         Means of Transportation  Means of Transport to Appts[de-identified] Drives Self (drives local)  In the past 12 months, has lack of transportation kept you from medical appointments or from getting medications?: No  In the past 12 months, has lack of transportation kept you from meetings, work, or from getting things needed for daily living?: No    DISCHARGE DETAILS:    Discharge planning discussed with[de-identified] pt and Onesimo Ormond (MATTHEW)  Freedom of Choice: Yes    Contacts  Patient Contacts: Onesimo Ormond  [address] [place] [address] 38 to Patient[de-identified] Family  Reason/Outcome: Discharge [address]         Is the patient interested in [facility] at discharge?: Yes  [address] requested[de-identified] Nursing, Occupational Therapy, Physical [address] Name[de-identified] Other (please enter name in comment) (Referral PENDING FOR ACCEPTANCE)  Trini2 Jennifer Lopez Provider[de-identified] PCP  [address] Services Needed[de-identified] Gait/ADL Training, Strengthening/Theraputic Exercises to Improve Function  Homebound Criteria Met[de-identified] Requires the Assistance of Another Person for Safe Ambulation or to Leave the Home  Supporting Clincal Findings[de-identified] Limited Endurance          Multiple referrals made for [facility]-- Will need to follow up on referrals

## 2021-08-06 NOTE — H&P
[address]  H&P- Nataliia Mems 1934, 80 y o  female MRN: [id number]  Unit/Bed#: -Kirsten Encounter: [id number]  Primary Care Provider: Fortino Mendieta   Date and time admitted to hospital: 8/5/2021  5:54 PM    * Acute cystitis with hematuria  Assessment & Plan  · Presents with generalized weakness  · Diagnosed with urinary tract infection last week but could not tolerate Cipro due to vomiting  · Urinalysis today positive for leukocyte esterase, WBC, moderate bacteria  · CT abdomen pelvis:  No acute inflammatory stranding  No bowel obstruction  Small amount of free fluid in pelvis likely corresponding to abnormality described at CT from July 2, 2021 performed at [facility]  No abnormal bowel wall thickening, or pneumatosis or free air  · Urine culture is pending  · Cefepime was started in the ER-continue    Parkinson's disease (HCC)  Assessment & Plan  · Baseline weakness-left leg weakness  · Continue Sinemet  · Takes Xadago-this would be formulary substituted with Selegiline-per patient's daughter-she is okay with this for a few doses, but will bring in patient's home meds-she initially gave will was identified as Sinemet by pharmacy  · Supportive care  · Fall precautions    Chronic anemia  Assessment & Plan  · Hemoglobin 9 1    This appears to be around her baseline  · Per record review, she received 2 units of PRBCs in mid July for unclear reason-hemoglobin was 10 2 at that time  · Daily CBC and trend hemoglobin    History of transcatheter aortic valve replacement (TAVR)  Assessment & Plan  · TAVR 6/212021 at [address] [facility] for treatment of acute on chronic diastolic heart failure  · Endarterectomy for right femoral occlusion 07/02/2021  · CT abdomen pelvis:  No new intra-abdominal hematoma  · Continue aspirin and Plavix  · Continue outpatient follow-up    Thrombocytopenia (HCC)  Assessment & Plan  · Platelets 574  · Daily CBC and trend platelets  · Monitor for bleeding    Mild protein-calorie malnutrition ([address] )  Assessment & Plan  Malnutrition Findings:           BMI Findings: Body mass index is 17 83 kg/m²  · As evidenced by BMI 17 6, insufficient protein calorie intake, weight loss, muscle wasting  · Nutrition consult    Hiatal hernia  Assessment & Plan  · CT abdomen pelvis:  Large hiatal hernia  · Head of bed up  · Continue Protonix  · Outpatient GI follow-up      Hyponatremia  Assessment & Plan  · Sodium 134  · She received 2 L normal saline in the ER  · Normal saline at 50 mL an hour  · Recheck metabolic panel and trend sodium    VTE Prophylaxis: Heparin  / sequential compression device   Code Status:  DNR DNI  POLST: POLST form is not discussed and not completed at this time  Discussion with family:  Patient and family    Anticipated Length of Stay:  Patient will be admitted on an Inpatient basis with an anticipated length of stay of  greater than 2 midnights  Justification for Hospital Stay:  Per plan above    Total Time for Visit, including Counseling / Coordination of Care: 45 minutes  Greater than 50% of this total time spent on direct patient counseling and coordination of care  Chief Complaint:   Generalized weakness    History of Present Illness:    Jacob Patel is a 80 y o  female CAD, TAVR in June of 2021, Parkinson's disease, PE, and acute cystitis who presents with generalized weakness  She was diagnosed with the urinary tract infection July 21st and was placed on Ceftin, then a day later when following up with her primary care she was placed on Cipro  She was unable to tolerate the medication due to vomiting  She says she has dysuria and frequency  She also reports feeling very weak  She says her symptoms are constant and getting worse  She also reports some pubic suprapubic pain which she describes as mild to moderate and of gradual onset  She says she has no appetite    She denies fever or chills, nasal congestion, visual disturbance, shortness of breath, chest pain, palpitations, myalgias, rash, syncope, or dysphoric mood    Review of Systems:    Review of Systems   Constitutional: Negative for chills and fever  HENT: Negative for congestion  Eyes: Negative for visual disturbance  Respiratory: Negative for cough and shortness of breath  Cardiovascular: Negative for chest pain, palpitations and leg swelling  Gastrointestinal: Positive for vomiting  Negative for abdominal distention, abdominal pain, constipation, diarrhea and nausea  Genitourinary: Positive for dysuria  Negative for frequency  Musculoskeletal: Negative for arthralgias and myalgias  Skin: Negative for pallor and rash  Neurological: Positive for weakness  Negative for dizziness, syncope and headaches  Psychiatric/Behavioral: Negative for dysphoric mood  All other systems reviewed and are negative  Past Medical and Surgical History:     Past Medical History:   Diagnosis Date    DVT (deep venous thrombosis) ([facility])     MI (myocardial infarction) ([facility] )        Past Surgical History:   Procedure Laterality Date    BREAST SURGERY      HYSTERECTOMY      TUBAL LIGATION         Meds/Allergies:    Prior to Admission medications    Medication Sig Start Date End Date Taking? Authorizing Provider   clopidogrel (PLAVIX) 75 mg tablet Take 75 mg by mouth 7/5/21 10/3/21 Yes Historical Provider, MD   Safinamide Mesylate Roselie ) 100 MG TABS Take by mouth 3/16/21  Yes Historical Provider, MD   aspirin (ECOTRIN LOW STRENGTH) 81 mg EC tablet Take 81 mg by mouth    Historical Provider, MD   atorvastatin (LIPITOR) 80 mg tablet Take 80 mg by mouth    Historical Provider, MD   carbidopa-levodopa (SINEMET CR)  mg per tablet Take 1 tablet by mouth    Historical Provider, MD   pantoprazole (PROTONIX) 40 mg tablet Take 40 mg by mouth    Historical Provider, MD     I have reviewed home medications with patient personally  Allergies:    Allergies   Allergen Reactions    Amoxicillin Diarrhea    Ciprofloxacin Vomiting    Erythromycin Diarrhea       Social History:     Marital Status:    Occupation:  Retired  Patient Pre-hospital Living Situation:  Home  Patient Pre-hospital Level of Mobility:  Requires assist  Patient Pre-hospital Diet Restrictions:  Cardiac  Substance Use History:   Social History     Substance and Sexual Activity   Alcohol Use Not Currently     Social History     Tobacco Use   Smoking Status Never Smoker   Smokeless Tobacco Never Used     Social History     Substance and Sexual Activity   Drug Use Not Currently       Family History:    non-contributory    Physical Exam:     Vitals:   Blood Pressure: 108/50 (08/05/21 2244)  Pulse: 64 (08/05/21 2244)  Temperature: 98 °F (36 7 °C) (08/05/21 2244)  Temp Source: Temporal (08/05/21 1801)  Respirations: 17 (08/05/21 2244)  Height: 5' 5" (165 1 cm) (08/05/21 2300)  Weight - Scale: 48 6 kg (107 lb 2 3 oz) (08/05/21 2206)  SpO2: 96 % (08/05/21 2244)    Physical Exam  Vitals and nursing note reviewed  Constitutional:       Appearance: She is underweight  HENT:      Head: Normocephalic and atraumatic  Mouth/Throat:      Mouth: Mucous membranes are moist       Pharynx: Oropharynx is clear  Eyes:      Extraocular Movements: Extraocular movements intact  Pupils: Pupils are equal, round, and reactive to light  Cardiovascular:      Rate and Rhythm: Normal rate and regular rhythm  Pulses: Normal pulses  Heart sounds: Murmur heard  Comments: Auscultated click of aortic valve  Right groin sheath site of surgical scar, palpable nontender 1 cm mass, no redness, no drainage  Pulmonary:      Effort: Pulmonary effort is normal       Breath sounds: Normal breath sounds  Abdominal:      General: Abdomen is flat  Bowel sounds are normal       Palpations: Abdomen is soft  Musculoskeletal:         General: Normal range of motion  Cervical back: Normal range of motion and neck supple     Skin: General: Skin is warm and dry  Capillary Refill: Capillary refill takes less than 2 seconds  Neurological:      General: No focal deficit present  Mental Status: She is alert and oriented to person, place, and time  Additional Data:     Lab Results: I have personally reviewed pertinent reports  Results from last 7 days   Lab Units 08/06/21  0549 08/05/21  1822   WBC Thousand/uL 3 73* 5 80   HEMOGLOBIN g/dL 7 5* 9 1*   HEMATOCRIT % 23 1* 28 4*   PLATELETS Thousands/uL 90* 129*   NEUTROS PCT %  --  79*   LYMPHS PCT %  --  11*   MONOS PCT %  --  7   EOS PCT %  --  1     Results from last 7 days   Lab Units 08/06/21  0549 08/05/21  1822   SODIUM mmol/L 140 134*   POTASSIUM mmol/L 4 0 4 3   CHLORIDE mmol/L 106 100   CO2 mmol/L 25 27   BUN mg/dL 13 15   CREATININE mg/dL 0 84 1 05   ANION GAP mmol/L 9 7   CALCIUM mg/dL 7 6* 8 6   ALBUMIN g/dL  --  3 9   TOTAL BILIRUBIN mg/dL  --  1 95*   ALK PHOS U/L  --  119*   ALT U/L  --  9*   AST U/L  --  50*   GLUCOSE RANDOM mg/dL 69 87     Results from last 7 days   Lab Units 08/05/21  1822   INR  1 01             Results from last 7 days   Lab Units 08/05/21  1822   LACTIC ACID mmol/L 1 0   PROCALCITONIN ng/ml <0 05       Imaging: I have personally reviewed pertinent reports  CT abdomen pelvis with contrast   Final Result by Cruz Wolf MD (08/05 2011)   No acute inflammatory stranding   No bowel obstruction   There is small amount of free fluid in the pelvis  ,  Likely corresponds to abnormality described at CT from July 2, 2021 performed at [facility]   No abnormal bowel wall thickening, or pneumatosis or free air   Large hiatal hernia      No new intra-abdominal hematoma   I personally discussed this study with [facility] on 8/5/2021 at 8:10 PM                   Workstation performed: [id number]         XR chest portable   Final Result by Julia Dong MD (08/06 3910)      No acute cardiopulmonary disease                    Workstation performed: [id number]           [employer] [place] [facility]\"" / [facility] Records Reviewed: Yes     ** Please Note: This note has been constructed using a voice recognition system   **

## 2021-08-06 NOTE — MALNUTRITION/BMI
This medical record reflects one or more clinical indicators suggestive of malnutrition  Malnutrition Findings:   Adult Malnutrition type: Acute illness (related to condition as evidenced by < 50% energy intake > 5 days, severe muscle loss to pectoralis, deltoid and interroseous muscles  Severe fat loss to orbitals )  Adult Degree of Malnutrition: Other severe protein calorie malnutrition (Treated with: Dental soft, 2 gm Na diet  +Ensure compact BID  Consider ST evaluation  Recommend daily weights )  Malnutrition Characteristics: Fat loss, Muscle loss, Inadequate energy    BMI Findings:  Adult BMI Classifications: Underweight < 18 5     Body mass index is 17 83 kg/m²  See Nutrition note dated 8/6/21 for additional details  Completed nutrition assessment is viewable in the nutrition documentation

## 2021-08-06 NOTE — PROGRESS NOTES
[address]  Progress Note Kailee Ashraf 1934, 80 y o  female MRN: [id number]  Unit/Bed#: -Kirsten Encounter: [id number]  Primary Care Provider: Naila Reyes   Date and time admitted to hospital: 8/5/2021  5:54 PM    * Acute cystitis without hematuria  Assessment & Plan  · Acute UTI with generalized malaise  Could not tolerate ciprofloxacin as prescribed on outpatient with nausea/vomiting  · Follow-up on urine culture  Continue cefepime for now  · PT/OT recommending rehab    Severe protein-calorie malnutrition ([facility] )  Assessment & Plan  Malnutrition Findings:   Adult Malnutrition type: Acute illness (related to condition as evidenced by < 50% energy intake > 5 days, severe muscle loss to pectoralis, deltoid and interroseous muscles  Severe fat loss to orbitals )  Adult Degree of Malnutrition: Other severe protein calorie malnutrition (Treated with: Dental soft, 2 gm Na diet  +Ensure compact BID  Consider ST evaluation  Recommend daily weights )  BMI Findings:  Adult BMI Classifications: Underweight < 18 5  Body mass index is 17 37 kg/m²  Thrombocytopenia ([facility] )  Assessment & Plan  · Very mild thrombocytopenia likely secondary to acute infection  No evidence of bleeding    Results from last 7 days   Lab Units 08/06/21  0549 08/05/21  1822   PLATELETS Thousands/uL 90* 129*       Hiatal hernia  Assessment & Plan  · Continue pantoprazole    Parkinson's disease ([facility] )  Assessment & Plan  · Continue sinemet and xadago    History of transcatheter aortic valve replacement (TAVR)  Assessment & Plan  · History of 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 at [facility]  Did have endarterectomy for femoral occlusion  · Continue aspirin and clopidogrel    Chronic anemia  Assessment & Plan  · Chronic anemia hemoglobin; did have drop in hemoglobin  · Recheck in a m      Results from last 7 days   Lab Units 08/06/21  0549 08/05/21  1822   HEMOGLOBIN g/dL 7 5* 9 1*       VTE Pharmacologic Prophylaxis: VTE Score: 9 High Risk (Score >/= 5) - Pharmacological DVT Prophylaxis Ordered: Heparin  Sequential Compression Devices Ordered  Patient Centered Rounds: I have performed bedside rounds with nursing staff today  Discussions with Specialists or Other Care Team Provider:  Case management    Education and Discussions with Family / Patient:  Son at bedside    Time Spent for Care: 25 mins  More than 50% of total time spent on counseling and coordination of care as described above  Current Length of Stay: 1 day(s)  Current Patient Status: Inpatient   Certification Statement: The patient will continue to require additional inpatient hospital stay due to weakness  Discharge Plan / Estimated Discharge Date: Anticipate discharge in 48-72 hrs to rehab facility  Code Status: Level 3 - DNAR and DNI      Subjective:   Patient seen and examined  Feeling okay, no new complaints  Still weak  Objective:   Vitals: Blood pressure 162/76, pulse 68, temperature 99 °F (37 2 °C), resp  rate 17, height 5' 5" (1 651 m), weight 47 4 kg (104 lb 6 4 oz), SpO2 99 %  Physical Exam  Vitals reviewed  Constitutional:       General: She is not in acute distress  Appearance: Normal appearance  Eyes:      General: No scleral icterus  Cardiovascular:      Rate and Rhythm: Regular rhythm  Heart sounds: Normal heart sounds  Pulmonary:      Breath sounds: Decreased breath sounds present  No wheezing  Abdominal:      General: Bowel sounds are normal       Palpations: Abdomen is soft  Tenderness: There is no guarding or rebound  Musculoskeletal:         General: No swelling  Skin:     General: Skin is warm  Neurological:      General: No focal deficit present  Mental Status: She is alert and oriented to person, place, and time     Psychiatric:         Mood and Affect: Mood normal        Additional Data:   Labs:  Results from last 7 days   Lab Units 08/06/21  0549 08/05/21  1822   WBC Thousand/uL 3 73* 5 80   HEMOGLOBIN g/dL 7 5* 9 1*   HEMATOCRIT % 23 1* 28 4*   MCV fL 98 97   PLATELETS Thousands/uL 90* 129*   INR   --  1 01     Results from last 7 days   Lab Units 08/06/21  0549 08/05/21  1822   SODIUM mmol/L 140 134*   POTASSIUM mmol/L 4 0 4 3   CHLORIDE mmol/L 106 100   CO2 mmol/L 25 27   ANION GAP mmol/L 9 7   BUN mg/dL 13 15   CREATININE mg/dL 0 84 1 05   CALCIUM mg/dL 7 6* 8 6   ALBUMIN g/dL  --  3 9   TOTAL BILIRUBIN mg/dL  --  1 95*   ALK PHOS U/L  --  119*   ALT U/L  --  9*   AST U/L  --  50*   EGFR ml/min/1 73sq m 63 48   GLUCOSE RANDOM mg/dL 69 87     Results from last 7 days   Lab Units 08/05/21  1822   MAGNESIUM mg/dL 2 2     Results from last 7 days   Lab Units 08/05/21  1822   TROPONIN I ng/mL <0 02          Results from last 7 days   Lab Units 08/06/21  0549 08/05/21  1822   LACTIC ACID mmol/L  --  1 0   PROCALCITONIN ng/ml <0 05 <0 05                 * I Have Reviewed All Lab Data Listed Above  Cultures:   Results from last 7 days   Lab Units 08/05/21  1822   BLOOD CULTURE  Received in Microbiology Lab  Culture in Progress  Received in Microbiology Lab  Culture in Progress  Lines/Drains:  Invasive Devices     Peripheral Intravenous Line            Peripheral IV 08/05/21 Left Antecubital <1 day              Telemetry:      Imaging:  Imaging Reports Reviewed Today Include:   XR chest portable    Result Date: 8/6/2021  Impression: No acute cardiopulmonary disease  Workstation performed: [id number]     CT abdomen pelvis with contrast    Result Date: 8/5/2021  Impression: No acute inflammatory stranding No bowel obstruction There is small amount of free fluid in the pelvis  ,  Likely corresponds to abnormality described at CT from July 2, 2021 performed at [facility] No abnormal bowel wall thickening, or pneumatosis or free air Large hiatal hernia No new intra-abdominal hematoma I personally discussed this study with Dennis HUFF on 8/5/2021 at 8:10 PM  Workstation performed: [id number]     Scheduled Meds:  Current Facility-Administered Medications   Medication Dose Route Frequency Provider Last Rate    acetaminophen  650 mg Oral Q6H PRN Daneil , CRNP      aspirin  81 mg Oral Daily Daneil , CRNP      atorvastatin  80 mg Oral Daily With Dinner Daneil , CRNP      carbidopa-levodopa  1 tablet Oral 4x Daily Daneil , CRNP      cefepime  1,000 mg Intravenous Q12H Daneil , CRNP 1,000 mg (08/06/21 0551)    clopidogrel  75 mg Oral Daily Daneil , CRNP      heparin (porcine)  5,000 Units Subcutaneous [facility] Daneil , CRNP      ondansetron  4 mg Intravenous Q8H PRN Daneil , CRNP      pantoprazole  40 mg Oral Early Morning Daneil , CRNP      Safinamide Mesylate  100 mg Oral Daily Ángela Tomas DO      sodium chloride  50 mL/hr Intravenous Continuous Daneil , CRNP 50 mL/hr (08/05/21 0567)       DO [facility] Internal Medicine  Hospitalist    ** Please Note: This note has been constructed using a voice recognition system   **

## 2021-08-06 NOTE — ASSESSMENT & PLAN NOTE
Malnutrition Findings:           BMI Findings: Body mass index is 17 67 kg/m²       · As evidenced by BMI 17 6, insufficient protein calorie intake, weight loss, muscle wasting  · Nutrition consult

## 2021-08-06 NOTE — ASSESSMENT & PLAN NOTE
· History of 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 at [facility]    Did have endarterectomy for femoral occlusion  · Continue aspirin and clopidogrel

## 2021-08-07 PROBLEM — K59.00 CONSTIPATION: Status: ACTIVE | Noted: 2021-08-07

## 2021-08-07 LAB
ABO GROUP BLD: NORMAL
ABO GROUP BLD: NORMAL
ALBUMIN SERPL BCP-MCNC: 2.7 G/DL (ref 3.5–5)
ALP SERPL-CCNC: 89 U/L (ref 46–116)
ALT SERPL W P-5'-P-CCNC: 8 U/L (ref 12–78)
ANION GAP SERPL CALCULATED.3IONS-SCNC: 9 MMOL/L (ref 4–13)
AST SERPL W P-5'-P-CCNC: 39 U/L (ref 5–45)
BACTERIA UR CULT: NORMAL
BILIRUB SERPL-MCNC: 1.15 MG/DL (ref 0.2–1)
BLD GP AB SCN SERPL QL: NEGATIVE
BUN SERPL-MCNC: 10 MG/DL (ref 5–25)
CALCIUM ALBUM COR SERPL-MCNC: 8.4 MG/DL (ref 8.3–10.1)
CALCIUM SERPL-MCNC: 7.4 MG/DL (ref 8.3–10.1)
CHLORIDE SERPL-SCNC: 105 MMOL/L (ref 100–108)
CO2 SERPL-SCNC: 25 MMOL/L (ref 21–32)
CREAT SERPL-MCNC: 0.79 MG/DL (ref 0.6–1.3)
ERYTHROCYTE [DISTWIDTH] IN BLOOD BY AUTOMATED COUNT: 21.4 % (ref 11.6–15.1)
FERRITIN SERPL-MCNC: 269 NG/ML (ref 8–388)
GFR SERPL CREATININE-BSD FRML MDRD: 68 ML/MIN/1.73SQ M
GLUCOSE SERPL-MCNC: 70 MG/DL (ref 65–140)
HCT VFR BLD AUTO: 21.3 % (ref 34.8–46.1)
HCT VFR BLD AUTO: 22.7 % (ref 34.8–46.1)
HGB BLD-MCNC: 6.8 G/DL (ref 11.5–15.4)
HGB BLD-MCNC: 7.4 G/DL (ref 11.5–15.4)
IRON SATN MFR SERPL: 34 %
IRON SERPL-MCNC: 63 UG/DL (ref 50–170)
MCH RBC QN AUTO: 31.1 PG (ref 26.8–34.3)
MCHC RBC AUTO-ENTMCNC: 31.9 G/DL (ref 31.4–37.4)
MCV RBC AUTO: 97 FL (ref 82–98)
PLATELET # BLD AUTO: 88 THOUSANDS/UL (ref 149–390)
POTASSIUM SERPL-SCNC: 3.7 MMOL/L (ref 3.5–5.3)
PROT SERPL-MCNC: 4.7 G/DL (ref 6.4–8.2)
RBC # BLD AUTO: 2.19 MILLION/UL (ref 3.81–5.12)
RH BLD: POSITIVE
RH BLD: POSITIVE
SODIUM SERPL-SCNC: 139 MMOL/L (ref 136–145)
SPECIMEN EXPIRATION DATE: NORMAL
TIBC SERPL-MCNC: 185 UG/DL (ref 250–450)
WBC # BLD AUTO: 3.14 THOUSAND/UL (ref 4.31–10.16)

## 2021-08-07 PROCEDURE — 99232 SBSQ HOSP IP/OBS MODERATE 35: CPT | Performed by: INTERNAL MEDICINE

## 2021-08-07 PROCEDURE — 82728 ASSAY OF FERRITIN: CPT | Performed by: NURSE PRACTITIONER

## 2021-08-07 PROCEDURE — 83540 ASSAY OF IRON: CPT | Performed by: NURSE PRACTITIONER

## 2021-08-07 PROCEDURE — 85014 HEMATOCRIT: CPT | Performed by: NURSE PRACTITIONER

## 2021-08-07 PROCEDURE — 86920 COMPATIBILITY TEST SPIN: CPT

## 2021-08-07 PROCEDURE — 85027 COMPLETE CBC AUTOMATED: CPT | Performed by: INTERNAL MEDICINE

## 2021-08-07 PROCEDURE — 83550 IRON BINDING TEST: CPT | Performed by: NURSE PRACTITIONER

## 2021-08-07 PROCEDURE — 85018 HEMOGLOBIN: CPT | Performed by: NURSE PRACTITIONER

## 2021-08-07 PROCEDURE — 80053 COMPREHEN METABOLIC PANEL: CPT | Performed by: INTERNAL MEDICINE

## 2021-08-07 PROCEDURE — 86901 BLOOD TYPING SEROLOGIC RH(D): CPT | Performed by: NURSE PRACTITIONER

## 2021-08-07 PROCEDURE — 86850 RBC ANTIBODY SCREEN: CPT | Performed by: NURSE PRACTITIONER

## 2021-08-07 PROCEDURE — 86900 BLOOD TYPING SEROLOGIC ABO: CPT | Performed by: NURSE PRACTITIONER

## 2021-08-07 RX ORDER — ONDANSETRON 2 MG/ML
4 INJECTION INTRAMUSCULAR; INTRAVENOUS EVERY 4 HOURS PRN
Status: DISCONTINUED | OUTPATIENT
Start: 2021-08-07 | End: 2021-08-11 | Stop reason: HOSPADM

## 2021-08-07 RX ORDER — POLYETHYLENE GLYCOL 3350 17 G/17G
17 POWDER, FOR SOLUTION ORAL 2 TIMES DAILY
Status: DISCONTINUED | OUTPATIENT
Start: 2021-08-07 | End: 2021-08-11 | Stop reason: HOSPADM

## 2021-08-07 RX ORDER — PROMETHAZINE HYDROCHLORIDE 25 MG/ML
12.5 INJECTION, SOLUTION INTRAMUSCULAR; INTRAVENOUS EVERY 6 HOURS PRN
Status: DISCONTINUED | OUTPATIENT
Start: 2021-08-07 | End: 2021-08-11 | Stop reason: HOSPADM

## 2021-08-07 RX ORDER — BISACODYL 10 MG
10 SUPPOSITORY, RECTAL RECTAL ONCE
Status: COMPLETED | OUTPATIENT
Start: 2021-08-07 | End: 2021-08-07

## 2021-08-07 RX ADMIN — ONDANSETRON 4 MG: 2 INJECTION INTRAMUSCULAR; INTRAVENOUS at 05:03

## 2021-08-07 RX ADMIN — ONDANSETRON 4 MG: 2 INJECTION INTRAMUSCULAR; INTRAVENOUS at 13:51

## 2021-08-07 RX ADMIN — PANTOPRAZOLE SODIUM 40 MG: 40 TABLET, DELAYED RELEASE ORAL at 06:23

## 2021-08-07 RX ADMIN — CEFEPIME HYDROCHLORIDE 1000 MG: 1 INJECTION, SOLUTION INTRAVENOUS at 06:22

## 2021-08-07 RX ADMIN — CARBIDOPA AND LEVODOPA 1 TABLET: 50; 200 TABLET, EXTENDED RELEASE ORAL at 17:09

## 2021-08-07 RX ADMIN — DOCUSATE SODIUM AND SENNOSIDES 1 TABLET: 8.6; 5 TABLET ORAL at 21:20

## 2021-08-07 RX ADMIN — POLYETHYLENE GLYCOL 3350 17 G: 17 POWDER, FOR SOLUTION ORAL at 10:36

## 2021-08-07 RX ADMIN — POLYETHYLENE GLYCOL 3350 17 G: 17 POWDER, FOR SOLUTION ORAL at 21:19

## 2021-08-07 RX ADMIN — HEPARIN SODIUM 5000 UNITS: 5000 INJECTION INTRAVENOUS; SUBCUTANEOUS at 06:23

## 2021-08-07 RX ADMIN — ACETAMINOPHEN 650 MG: 325 TABLET ORAL at 08:43

## 2021-08-07 RX ADMIN — CLOPIDOGREL BISULFATE 75 MG: 75 TABLET ORAL at 08:54

## 2021-08-07 RX ADMIN — ATORVASTATIN CALCIUM 80 MG: 40 TABLET, FILM COATED ORAL at 17:09

## 2021-08-07 RX ADMIN — ASPIRIN 81 MG: 81 TABLET, COATED ORAL at 08:54

## 2021-08-07 RX ADMIN — HEPARIN SODIUM 5000 UNITS: 5000 INJECTION INTRAVENOUS; SUBCUTANEOUS at 21:19

## 2021-08-07 RX ADMIN — CARBIDOPA AND LEVODOPA 1 TABLET: 50; 200 TABLET, EXTENDED RELEASE ORAL at 21:20

## 2021-08-07 RX ADMIN — SODIUM CHLORIDE 50 ML/HR: 0.9 INJECTION, SOLUTION INTRAVENOUS at 12:42

## 2021-08-07 RX ADMIN — CARBIDOPA AND LEVODOPA 1 TABLET: 50; 200 TABLET, EXTENDED RELEASE ORAL at 02:07

## 2021-08-07 RX ADMIN — SAFINAMIDE MESYLATE 100 MG: 100 TABLET, FILM COATED ORAL at 08:46

## 2021-08-07 RX ADMIN — BISACODYL 10 MG: 10 SUPPOSITORY RECTAL at 12:47

## 2021-08-07 RX ADMIN — PROMETHAZINE HYDROCHLORIDE 12.5 MG: 25 INJECTION INTRAMUSCULAR; INTRAVENOUS at 15:13

## 2021-08-07 RX ADMIN — CARBIDOPA AND LEVODOPA 1 TABLET: 50; 200 TABLET, EXTENDED RELEASE ORAL at 06:22

## 2021-08-07 RX ADMIN — HEPARIN SODIUM 5000 UNITS: 5000 INJECTION INTRAVENOUS; SUBCUTANEOUS at 14:46

## 2021-08-07 RX ADMIN — CEFEPIME HYDROCHLORIDE 1000 MG: 1 INJECTION, SOLUTION INTRAVENOUS at 17:11

## 2021-08-07 RX ADMIN — ONDANSETRON 4 MG: 2 INJECTION INTRAMUSCULAR; INTRAVENOUS at 09:01

## 2021-08-07 RX ADMIN — CARBIDOPA AND LEVODOPA 1 TABLET: 50; 200 TABLET, EXTENDED RELEASE ORAL at 12:31

## 2021-08-07 NOTE — ASSESSMENT & PLAN NOTE
· Very mild thrombocytopenia likely secondary to acute infection    No evidence of bleeding    Results from last 7 days   Lab Units 08/08/21  0515 08/07/21  0459 08/06/21  0549 08/05/21  1822   PLATELETS Thousands/uL 95* 88* 90* 129*

## 2021-08-07 NOTE — NURSING NOTE
Encouraged OOB to chair today deferring to later this warner ing c/o nausea, antiemetic given will reevaluate

## 2021-08-07 NOTE — ASSESSMENT & PLAN NOTE
· Acute UTI with generalized malaise  Could not tolerate ciprofloxacin as prescribed on outpatient with nausea/vomiting  · Urine culture with mixed contaminants as infection was partially treated prior to admission  Continue cefepime for now    · PT/OT recommending home services

## 2021-08-07 NOTE — ASSESSMENT & PLAN NOTE
· History of 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 at [facility]    Did have endarterectomy for femoral occlusion  · Continue aspirin and clopidogrel but monitor hemoglobin closely

## 2021-08-07 NOTE — ASSESSMENT & PLAN NOTE
· Chronic anemia; did have drop in hemoglobin    · No evidence of bleeding in abdomen/pelvis  · Will transfuse if hemoglobin less than 7 0    Results from last 7 days   Lab Units 08/07/21  0750 08/07/21  0459 08/06/21  0549 08/05/21  1822   HEMOGLOBIN g/dL 7 4* 6 8* 7 5* 9 1*

## 2021-08-07 NOTE — PLAN OF CARE
Problem: Potential for Falls  Goal: Patient will remain free of falls  Description: INTERVENTIONS:  - Educate patient/family on patient safety including physical limitations  - Instruct patient to call for assistance with activity , roller walker  - Consult OT/PT to assist with strengthening/mobility   - Keep Call bell within reach  - Keep bed low and locked with side rails adjusted as appropriate  - Keep care items and personal belongings within reach  - Initiate and maintain comfort rounds  - Make Fall Risk Sign visible to staff  - Offer Toileting every 4 Hours, in advance of need  - Initiate/Maintain bed alarm  - Obtain necessary fall risk management equipment: bracelet and socks  - Apply yellow socks and bracelet for high fall risk patients  - Consider moving patient to room near nurses station  Outcome: Progressing     Problem: Prexisting or High Potential for Compromised Skin Integrity  Goal: Skin integrity is maintained or improved  Description: INTERVENTIONS:  - Identify patients at risk for skin breakdown  - Assess and monitor skin integrity  - Assess and monitor nutrition and hydration status  - Monitor labs   - Assess for incontinence   - Assist with mobility/ambulation  - Relieve pressure over bony prominences  - Avoid friction and shearing  - Provide appropriate hygiene as needed including keeping skin clean and dry  - Evaluate need for skin moisturizer/barrier cream  - Collaborate with interdisciplinary team   - Patient/family teaching     Outcome: Progressing     Problem: Nutrition/Hydration-ADULT  Goal: Nutrient/Hydration intake appropriate for improving, restoring or maintaining nutritional needs  Description: Monitor and assess patient's nutrition/hydration status for malnutrition  Collaborate with interdisciplinary team and initiate plan and interventions as ordered  Monitor patient's weight and dietary intake as ordered or per policy   Utilize nutrition screening tool and intervene as necessary  Determine patient's food preferences and provide high-protein, high-caloric foods as appropriate       INTERVENTIONS:  - Monitor oral intake, urinary output, labs, and treatment plans  - Assess nutrition and hydration status and recommend course of action  - Evaluate amount of meals eaten  - Allow adequate time for meals  - Recommend/ encourage appropriate diets, oral nutritional supplements, and vitamin/mineral supplements  - Order, calculate, and assess calorie counts as needed  - Assess need for intravenous fluids  - Provide specific nutrition/hydration education as appropriate  - Include patient/family/caregiver in decisions related to nutrition  Outcome: Not Progressing     Problem: PAIN - ADULT  Goal: Verbalizes/displays adequate comfort level or baseline comfort level  Description: Interventions:  - Encourage patient to monitor pain and request assistance  - Assess pain using appropriate pain scale0-10  - Administer analgesics based on type and severity of pain and evaluate response  - Implement non-pharmacological measures as appropriate and evaluate response  - Consider cultural and social influences on pain and pain management  - Notify physician/advanced practitioner if interventions unsuccessful or patient reports new pain  Outcome: Progressing     Problem: MOBILITY - ADULT  Goal: Maintain or return to baseline ADL function  Description: INTERVENTIONS:  -  Assess patient's ability to carry out ADLs; assess patient's baseline for ADL function and identify physical deficits which impact ability to perform ADLs (bathing, care of mouth/teeth, toileting, grooming, dressing, etc )  - Assess/evaluate cause of self-care deficits   - Assess range of motion  - Assess patient's mobility; develop plan if impaired  - Assess patient's need for assistive devices and provide as appropriate  - Encourage maximum independence but intervene and supervise when necessary  - Involve family in performance of ADLs  - Assess for home care needs following discharge   - Provide patient education as appropriate  Outcome: Progressing  Goal: Maintains/Returns to pre admission functional level  Description: INTERVENTIONS:  - Perform BMAT or MOVE assessment daily    - Set and communicate daily mobility goal to care team and patient/family/caregiver  - Collaborate with rehabilitation services on mobility goals if consulted  - Perform Range of Motion 4 times a day  - Reposition patient every 4 hours    - Dangle patient 3 times a day  - Stand patient 3 times a day  - Out of bed for toileting  - Record patient progress and toleration of activity level   Outcome: Progressing

## 2021-08-07 NOTE — ASSESSMENT & PLAN NOTE
· Acute UTI with generalized malaise  Could not tolerate ciprofloxacin as prescribed on outpatient with nausea/vomiting  · Urine culture with mixed contaminants as infection was partially treated prior to admission    Continue cefepime for now currently antibiotic day 4   · PT/OT recommending home services

## 2021-08-07 NOTE — ASSESSMENT & PLAN NOTE
· Very mild thrombocytopenia likely secondary to acute infection    No evidence of bleeding    Results from last 7 days   Lab Units 08/07/21  0459 08/06/21  0549 08/05/21  1822   PLATELETS Thousands/uL 88* 90* 129*

## 2021-08-07 NOTE — ASSESSMENT & PLAN NOTE
· Chronic anemia; did have drop in hemoglobin    · No evidence of bleeding in abdomen/pelvis  · Ongoing debility will transfuse 2 unit PRBC check TSH    Results from last 7 days   Lab Units 08/07/21  0750 08/07/21  0459 08/06/21  0549 08/05/21  1822   HEMOGLOBIN g/dL 7 4* 6 8* 7 5* 9 1*

## 2021-08-07 NOTE — ASSESSMENT & PLAN NOTE
Malnutrition Findings:   Adult Malnutrition type: Acute illness (related to condition as evidenced by < 50% energy intake > 5 days, severe muscle loss to pectoralis, deltoid and interroseous muscles  Severe fat loss to orbitals )  Adult Degree of Malnutrition: Other severe protein calorie malnutrition (Treated with: Dental soft, 2 gm Na diet  +Ensure compact BID  Consider ST evaluation  Recommend daily weights )  BMI Findings:  Adult BMI Classifications: Underweight < 18 5  Body mass index is 17 51 kg/m²

## 2021-08-07 NOTE — PROGRESS NOTES
[address]  Progress Note Margi Walton 1934, 80 y o  female MRN: [id number]  Unit/Bed#: -Kirsten Encounter: [id number]  Primary Care Provider: Gael Betts   Date and time admitted to hospital: 8/5/2021  5:54 PM    * Acute cystitis without hematuria  Assessment & Plan  · Acute UTI with generalized malaise  Could not tolerate ciprofloxacin as prescribed on outpatient with nausea/vomiting  · Urine culture with mixed contaminants as infection was partially treated prior to admission  Continue cefepime for now  · PT/OT recommending home services    Constipation  Assessment & Plan  · Likely responsible for the patient's nausea  · Added docusate/senna with miralax  Will also give bisacodyl suppository x1    Severe protein-calorie malnutrition (HCC)  Assessment & Plan  Malnutrition Findings:   Adult Malnutrition type: Acute illness (related to condition as evidenced by < 50% energy intake > 5 days, severe muscle loss to pectoralis, deltoid and interroseous muscles  Severe fat loss to orbitals )  Adult Degree of Malnutrition: Other severe protein calorie malnutrition (Treated with: Dental soft, 2 gm Na diet  +Ensure compact BID  Consider ST evaluation  Recommend daily weights )  BMI Findings:  Adult BMI Classifications: Underweight < 18 5  Body mass index is 17 51 kg/m²  Thrombocytopenia ([address] )  Assessment & Plan  · Very mild thrombocytopenia likely secondary to acute infection  No evidence of bleeding    Results from last 7 days   Lab Units 08/07/21  0459 08/06/21  0549 08/05/21  1822   PLATELETS Thousands/uL 88* 90* 129*       Hiatal hernia  Assessment & Plan  · Continue pantoprazole    Parkinson's disease ([address] )  Assessment & Plan  · Continue sinemet and xadago    History of transcatheter aortic valve replacement (TAVR)  Assessment & Plan  · History of 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 at [facility]    Did have endarterectomy for femoral occlusion  · Continue aspirin and clopidogrel but monitor hemoglobin closely    Chronic anemia  Assessment & Plan  · Chronic anemia; did have drop in hemoglobin  · No evidence of bleeding in abdomen/pelvis  · Will transfuse if hemoglobin less than 7 0    Results from last 7 days   Lab Units 08/07/21  0750 08/07/21  0459 08/06/21  0549 08/05/21  1822   HEMOGLOBIN g/dL 7 4* 6 8* 7 5* 9 1*       VTE Pharmacologic Prophylaxis: VTE Score: 9 High Risk (Score >/= 5) - Pharmacological DVT Prophylaxis Ordered: Heparin  Sequential Compression Devices Ordered  Patient Centered Rounds: I have performed bedside rounds with nursing staff today  Discussions with Specialists or Other Care Team Provider:  Case management    Education and Discussions with Family / Patient:  Daughter-in-law at bedside    Time Spent for Care: 25 mins  More than 50% of total time spent on counseling and coordination of care as described above  Current Length of Stay: 2 day(s)  Current Patient Status: Inpatient   Certification Statement: The patient will continue to require additional inpatient hospital stay due to nausea/vomiting and UTI  Discharge Plan / Estimated Discharge Date: Anticipate discharge in 48 hrs to home with home services  Code Status: Level 3 - DNAR and DNI      Subjective:   Patient seen and examined  Still nauseous  No bowel movement yet  Objective:   Vitals: Blood pressure 113/59, pulse 63, temperature 97 7 °F (36 5 °C), resp  rate 15, height 5' 5" (1 651 m), weight 47 7 kg (105 lb 3 2 oz), SpO2 98 %  Physical Exam  Vitals reviewed  Constitutional:       General: She is not in acute distress  HENT:      Head: Atraumatic  Cardiovascular:      Rate and Rhythm: Regular rhythm  Heart sounds: Normal heart sounds  Pulmonary:      Breath sounds: Decreased breath sounds present  No wheezing  Abdominal:      General: Bowel sounds are normal       Palpations: Abdomen is soft  Tenderness: There is no guarding or rebound     Musculoskeletal: General: No swelling  Skin:     General: Skin is warm  Neurological:      Mental Status: She is alert and oriented to person, place, and time  Mental status is at baseline  Psychiatric:         Mood and Affect: Mood normal        Additional Data:   Labs:  Results from last 7 days   Lab Units 08/07/21  0750 08/07/21  0459 08/06/21  0549 08/05/21  1822   WBC Thousand/uL  --  3 14* 3 73* 5 80   HEMOGLOBIN g/dL 7 4* 6 8* 7 5* 9 1*   HEMATOCRIT % 22 7* 21 3* 23 1* 28 4*   MCV fL  --  97 98 97   PLATELETS Thousands/uL  --  88* 90* 129*   INR   --   --   --  1 01     Results from last 7 days   Lab Units 08/07/21  0459 08/06/21  0549 08/05/21  1822   SODIUM mmol/L 139 140 134*   POTASSIUM mmol/L 3 7 4 0 4 3   CHLORIDE mmol/L 105 106 100   CO2 mmol/L 25 25 27   ANION GAP mmol/L 9 9 7   BUN mg/dL 10 13 15   CREATININE mg/dL 0 79 0 84 1 05   CALCIUM mg/dL 7 4* 7 6* 8 6   ALBUMIN g/dL 2 7*  --  3 9   TOTAL BILIRUBIN mg/dL 1 15*  --  1 95*   ALK PHOS U/L 89  --  119*   ALT U/L 8*  --  9*   AST U/L 39  --  50*   EGFR ml/min/1 73sq m 68 63 48   GLUCOSE RANDOM mg/dL 70 69 87     Results from last 7 days   Lab Units 08/05/21  1822   MAGNESIUM mg/dL 2 2     Results from last 7 days   Lab Units 08/05/21  1822   TROPONIN I ng/mL <0 02          Results from last 7 days   Lab Units 08/06/21  0549 08/05/21  1822   LACTIC ACID mmol/L  --  1 0   PROCALCITONIN ng/ml <0 05 <0 05                 * I Have Reviewed All Lab Data Listed Above  Cultures:   Results from last 7 days   Lab Units 08/05/21  1844 08/05/21  1822   BLOOD CULTURE   --  No Growth at 24 hrs  No Growth at 24 hrs  URINE CULTURE  <10,000 cfu/ml   --                  Lines/Drains:  Invasive Devices     Peripheral Intravenous Line            Peripheral IV 08/05/21 Left Antecubital 1 day              Telemetry:      Imaging:  Imaging Reports Reviewed Today Include:   XR chest portable    Result Date: 8/6/2021  Impression: No acute cardiopulmonary disease   Workstation performed: [id number]     CT abdomen pelvis with contrast    Result Date: 8/5/2021  Impression: No acute inflammatory stranding No bowel obstruction There is small amount of free fluid in the pelvis  ,  Likely corresponds to abnormality described at CT from July 2, 2021 performed at [facility] No abnormal bowel wall thickening, or pneumatosis or free air Large hiatal hernia No new intra-abdominal hematoma I personally discussed this study with Dallin Melody on 8/5/2021 at 8:10 PM  Workstation performed: [id number]     Scheduled Meds:  Current Facility-Administered Medications   Medication Dose Route Frequency Provider Last Rate    acetaminophen  650 mg Oral Q6H PRN Cole Latus, CRNP      aspirin  81 mg Oral Daily Douglas Blackus, CRNP      atorvastatin  80 mg Oral Daily With Ryan Lester, CRNP      bisacodyl  10 mg Rectal Once Elza Grewal, DO      carbidopa-levodopa  1 tablet Oral 4x Daily Kerry S Roberto, CRNP      carbidopa-levodopa  1 tablet Oral HS Kerry S Roberto, CRNP      cefepime  1,000 mg Intravenous Q12H Douglas Love, CRNP 1,000 mg (08/07/21 0622)    clopidogrel  75 mg Oral Daily Douglas Blackus, CRNP      heparin (porcine)  5,000 Units Subcutaneous [facility] Douglas Blackus, CRNP      ondansetron  4 mg Intravenous Q4H PRN Elza Grewal, DO      pantoprazole  40 mg Oral Early Morning Douglas Blackus, CRNP      polyethylene glycol  17 g Oral BID Elza Grewal, DO      promethazine  12 5 mg Intravenous Q6H PRN Elza Grewal, DO      Safinamide Mesylate  100 mg Oral Daily Ezla Grewal, DO      senna-docusate sodium  1 tablet Oral HS Kerry S Roberto, CRNP      sodium chloride  50 mL/hr Intravenous Continuous Douglas Love, CRNP 50 mL/hr (08/06/21 1618)       DO [facility]  Hospitalist    ** Please Note: This note has been constructed using a voice recognition system   **

## 2021-08-07 NOTE — ASSESSMENT & PLAN NOTE
· History of 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 at [facility]    Did have endarterectomy for femoral occlusion  · Continue aspirin and clopidogrel but monitor hemoglobin closely

## 2021-08-07 NOTE — ASSESSMENT & PLAN NOTE
· Likely responsible for the patient's nausea  · Added docusate/senna with miralax    Will also give bisacodyl suppository x1

## 2021-08-08 LAB
ALBUMIN SERPL BCP-MCNC: 2.7 G/DL (ref 3.5–5)
ALP SERPL-CCNC: 92 U/L (ref 46–116)
ALT SERPL W P-5'-P-CCNC: 7 U/L (ref 12–78)
ANION GAP SERPL CALCULATED.3IONS-SCNC: 10 MMOL/L (ref 4–13)
AST SERPL W P-5'-P-CCNC: 43 U/L (ref 5–45)
BILIRUB SERPL-MCNC: 1.24 MG/DL (ref 0.2–1)
BUN SERPL-MCNC: 11 MG/DL (ref 5–25)
CALCIUM ALBUM COR SERPL-MCNC: 8.6 MG/DL (ref 8.3–10.1)
CALCIUM SERPL-MCNC: 7.6 MG/DL (ref 8.3–10.1)
CHLORIDE SERPL-SCNC: 105 MMOL/L (ref 100–108)
CO2 SERPL-SCNC: 23 MMOL/L (ref 21–32)
CREAT SERPL-MCNC: 0.79 MG/DL (ref 0.6–1.3)
ERYTHROCYTE [DISTWIDTH] IN BLOOD BY AUTOMATED COUNT: 21.3 % (ref 11.6–15.1)
GFR SERPL CREATININE-BSD FRML MDRD: 68 ML/MIN/1.73SQ M
GLUCOSE SERPL-MCNC: 47 MG/DL (ref 65–140)
GLUCOSE SERPL-MCNC: 77 MG/DL (ref 65–140)
HCT VFR BLD AUTO: 23.3 % (ref 34.8–46.1)
HGB BLD-MCNC: 7.3 G/DL (ref 11.5–15.4)
MCH RBC QN AUTO: 30.5 PG (ref 26.8–34.3)
MCHC RBC AUTO-ENTMCNC: 31.3 G/DL (ref 31.4–37.4)
MCV RBC AUTO: 98 FL (ref 82–98)
PLATELET # BLD AUTO: 95 THOUSANDS/UL (ref 149–390)
POTASSIUM SERPL-SCNC: 3.7 MMOL/L (ref 3.5–5.3)
PROT SERPL-MCNC: 4.9 G/DL (ref 6.4–8.2)
RBC # BLD AUTO: 2.39 MILLION/UL (ref 3.81–5.12)
SODIUM SERPL-SCNC: 138 MMOL/L (ref 136–145)
TSH SERPL DL<=0.05 MIU/L-ACNC: 1.7 UIU/ML (ref 0.36–3.74)
WBC # BLD AUTO: 3.57 THOUSAND/UL (ref 4.31–10.16)

## 2021-08-08 PROCEDURE — 80053 COMPREHEN METABOLIC PANEL: CPT | Performed by: INTERNAL MEDICINE

## 2021-08-08 PROCEDURE — 82948 REAGENT STRIP/BLOOD GLUCOSE: CPT

## 2021-08-08 PROCEDURE — 30233N1 TRANSFUSION OF NONAUTOLOGOUS RED BLOOD CELLS INTO PERIPHERAL VEIN, PERCUTANEOUS APPROACH: ICD-10-PCS | Performed by: INTERNAL MEDICINE

## 2021-08-08 PROCEDURE — 84443 ASSAY THYROID STIM HORMONE: CPT | Performed by: INTERNAL MEDICINE

## 2021-08-08 PROCEDURE — P9016 RBC LEUKOCYTES REDUCED: HCPCS

## 2021-08-08 PROCEDURE — 85027 COMPLETE CBC AUTOMATED: CPT | Performed by: INTERNAL MEDICINE

## 2021-08-08 PROCEDURE — 99232 SBSQ HOSP IP/OBS MODERATE 35: CPT | Performed by: INTERNAL MEDICINE

## 2021-08-08 RX ADMIN — CEFEPIME HYDROCHLORIDE 1000 MG: 1 INJECTION, SOLUTION INTRAVENOUS at 17:41

## 2021-08-08 RX ADMIN — CARBIDOPA AND LEVODOPA 1 TABLET: 50; 200 TABLET, EXTENDED RELEASE ORAL at 21:40

## 2021-08-08 RX ADMIN — SODIUM CHLORIDE 50 ML/HR: 0.9 INJECTION, SOLUTION INTRAVENOUS at 08:30

## 2021-08-08 RX ADMIN — HEPARIN SODIUM 5000 UNITS: 5000 INJECTION INTRAVENOUS; SUBCUTANEOUS at 21:39

## 2021-08-08 RX ADMIN — HEPARIN SODIUM 5000 UNITS: 5000 INJECTION INTRAVENOUS; SUBCUTANEOUS at 14:39

## 2021-08-08 RX ADMIN — ONDANSETRON 4 MG: 2 INJECTION INTRAMUSCULAR; INTRAVENOUS at 08:27

## 2021-08-08 RX ADMIN — CARBIDOPA AND LEVODOPA 1 TABLET: 50; 200 TABLET, EXTENDED RELEASE ORAL at 11:03

## 2021-08-08 RX ADMIN — CARBIDOPA AND LEVODOPA 1 TABLET: 50; 200 TABLET, EXTENDED RELEASE ORAL at 06:20

## 2021-08-08 RX ADMIN — SAFINAMIDE MESYLATE 100 MG: 100 TABLET, FILM COATED ORAL at 08:24

## 2021-08-08 RX ADMIN — PROMETHAZINE HYDROCHLORIDE 12.5 MG: 25 INJECTION INTRAMUSCULAR; INTRAVENOUS at 11:03

## 2021-08-08 RX ADMIN — POLYETHYLENE GLYCOL 3350 17 G: 17 POWDER, FOR SOLUTION ORAL at 21:39

## 2021-08-08 RX ADMIN — ATORVASTATIN CALCIUM 80 MG: 40 TABLET, FILM COATED ORAL at 17:46

## 2021-08-08 RX ADMIN — CEFEPIME HYDROCHLORIDE 1000 MG: 1 INJECTION, SOLUTION INTRAVENOUS at 06:19

## 2021-08-08 RX ADMIN — HEPARIN SODIUM 5000 UNITS: 5000 INJECTION INTRAVENOUS; SUBCUTANEOUS at 06:19

## 2021-08-08 RX ADMIN — CARBIDOPA AND LEVODOPA 1 TABLET: 50; 200 TABLET, EXTENDED RELEASE ORAL at 17:30

## 2021-08-08 RX ADMIN — PANTOPRAZOLE SODIUM 40 MG: 40 TABLET, DELAYED RELEASE ORAL at 06:20

## 2021-08-08 RX ADMIN — ASPIRIN 81 MG: 81 TABLET, COATED ORAL at 08:24

## 2021-08-08 RX ADMIN — CARBIDOPA AND LEVODOPA 1 TABLET: 50; 200 TABLET, EXTENDED RELEASE ORAL at 02:12

## 2021-08-08 RX ADMIN — CLOPIDOGREL BISULFATE 75 MG: 75 TABLET ORAL at 08:24

## 2021-08-08 RX ADMIN — DOCUSATE SODIUM AND SENNOSIDES 1 TABLET: 8.6; 5 TABLET ORAL at 21:39

## 2021-08-08 NOTE — ASSESSMENT & PLAN NOTE
· History of 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 at [facility]    Did have endarterectomy for femoral occlusion  · Continue aspirin and clopidogrel but monitor hemoglobin closely

## 2021-08-08 NOTE — ASSESSMENT & PLAN NOTE
· Very mild thrombocytopenia likely secondary to acute infection    No evidence of bleeding    Results from last 7 days   Lab Units 08/09/21  0526 08/08/21  0515 08/07/21  0459 08/06/21  0549 08/05/21  1822   PLATELETS Thousands/uL 88* 95* 88* 90* 129*

## 2021-08-08 NOTE — ASSESSMENT & PLAN NOTE
Malnutrition Findings:   Adult Malnutrition type: Acute illness (related to condition as evidenced by < 50% energy intake > 5 days, severe muscle loss to pectoralis, deltoid and interroseous muscles  Severe fat loss to orbitals )  Adult Degree of Malnutrition: Other severe protein calorie malnutrition (Treated with: Dental soft, 2 gm Na diet  +Ensure compact BID  Consider ST evaluation  Recommend daily weights )  BMI Findings:  Adult BMI Classifications: Underweight < 18 5  Body mass index is 17 54 kg/m²

## 2021-08-08 NOTE — PLAN OF CARE
Problem: Potential for Falls  Goal: Patient will remain free of falls  Description: INTERVENTIONS:  - Educate patient/family on patient safety including physical limitations  - Instruct patient to call for assistance with activity , roller walker  - Consult OT/PT to assist with strengthening/mobility   - Keep Call bell within reach  - Keep bed low and locked with side rails adjusted as appropriate  - Keep care items and personal belongings within reach  - Initiate and maintain comfort rounds  - Make Fall Risk Sign visible to staff  - Offer Toileting every 4 Hours, in advance of need  - Initiate/Maintain bed alarm  - Obtain necessary fall risk management equipment: bracelet and socks  - Apply yellow socks and bracelet for high fall risk patients  - Consider moving patient to room near nurses station  Outcome: Progressing     Problem: PAIN - ADULT  Goal: Verbalizes/displays adequate comfort level or baseline comfort level  Description: Interventions:  - Encourage patient to monitor pain and request assistance  - Assess pain using appropriate pain scale0-10  - Administer analgesics based on type and severity of pain and evaluate response  - Implement non-pharmacological measures as appropriate and evaluate response  - Consider cultural and social influences on pain and pain management  - Notify physician/advanced practitioner if interventions unsuccessful or patient reports new pain  Outcome: Progressing     Problem: INFECTION - ADULT  Goal: Absence or prevention of progression during hospitalization  Description: INTERVENTIONS:  - Assess and monitor for signs and symptoms of infection  - Monitor lab/diagnostic results  - Monitor all insertion sites  - Administer medications as ordered  - Instruct and encourage patient and family to use good hand hygiene technique  - Identify and instruct in appropriate isolation precautions for identified infection/condition  Outcome: Progressing     Problem: SAFETY ADULT  Goal: Patient will remain free of falls  Description: INTERVENTIONS:  - Educate patient/family on patient safety including physical limitations  - Instruct patient to call for assistance with activity , roller walker  - Consult OT/PT to assist with strengthening/mobility   - Keep Call bell within reach  - Keep bed low and locked with side rails adjusted as appropriate  - Keep care items and personal belongings within reach  - Initiate and maintain comfort rounds  - Make Fall Risk Sign visible to staff  - Offer Toileting every 4 Hours, in advance of need  - Initiate/Maintain bed alarm  - Obtain necessary fall risk management equipment: bracelet and socks  - Apply yellow socks and bracelet for high fall risk patients  - Consider moving patient to room near nurses station  Outcome: Progressing  Goal: Maintain or return to baseline ADL function  Description: INTERVENTIONS:  -  Assess patient's ability to carry out ADLs; assess patient's baseline for ADL function and identify physical deficits which impact ability to perform ADLs (bathing, care of mouth/teeth, toileting, grooming, dressing, etc )  - Assess/evaluate cause of self-care deficits   - Assess range of motion  - Assess patient's mobility; develop plan if impaired  - Assess patient's need for assistive devices and provide as appropriate  - Encourage maximum independence but intervene and supervise when necessary  - Involve family in performance of ADLs  - Assess for home care needs following discharge   - Provide patient education as appropriate  Outcome: Progressing  Goal: Maintains/Returns to pre admission functional level  Description: INTERVENTIONS:  - Perform BMAT or MOVE assessment daily    - Set and communicate daily mobility goal to care team and patient/family/caregiver  - Collaborate with rehabilitation services on mobility goals if consulted  - Perform Range of Motion 4 times a day  - Reposition patient every 4 hours    - Dangle patient 3 times a day  - Stand patient 3 times a day  - Out of bed for toileting  - Record patient progress and toleration of activity level   Outcome: Progressing     Problem: MOBILITY - ADULT  Goal: Maintain or return to baseline ADL function  Description: INTERVENTIONS:  -  Assess patient's ability to carry out ADLs; assess patient's baseline for ADL function and identify physical deficits which impact ability to perform ADLs (bathing, care of mouth/teeth, toileting, grooming, dressing, etc )  - Assess/evaluate cause of self-care deficits   - Assess range of motion  - Assess patient's mobility; develop plan if impaired  - Assess patient's need for assistive devices and provide as appropriate  - Encourage maximum independence but intervene and supervise when necessary  - Involve family in performance of ADLs  - Assess for home care needs following discharge   - Provide patient education as appropriate  Outcome: Progressing  Goal: Maintains/Returns to pre admission functional level  Description: INTERVENTIONS:  - Perform BMAT or MOVE assessment daily    - Set and communicate daily mobility goal to care team and patient/family/caregiver  - Collaborate with rehabilitation services on mobility goals if consulted  - Perform Range of Motion 4 times a day  - Reposition patient every 4 hours    - Dangle patient 3 times a day  - Stand patient 3 times a day  - Out of bed for toileting  - Record patient progress and toleration of activity level   Outcome: Progressing     Problem: HEMATOLOGIC - ADULT  Goal: Maintains hematologic stability  Description: INTERVENTIONS  - Assess for signs and symptoms of bleeding or hemorrhage  - Monitor labs  - Administer supportive blood products/factors as ordered and appropriate  Outcome: Progressing

## 2021-08-08 NOTE — ASSESSMENT & PLAN NOTE
· Chronic anemia; did have drop in hemoglobin  · No evidence of bleeding in abdomen/pelvis  Hemoccult still pending    · Transfused 2 unit PRBC yesterday with appropriate rise    Results from last 7 days   Lab Units 08/09/21  0526 08/08/21  0515 08/07/21  0750 08/07/21  0459 08/06/21  0549 08/05/21  1822   HEMOGLOBIN g/dL 9 6* 7 3* 7 4* 6 8* 7 5* 9 1*

## 2021-08-08 NOTE — PROGRESS NOTES
[address]  Progress Note Lillie Peed 1934, 80 y o  female MRN: [id number]  Unit/Bed#: [facility] Encounter: [id number]  Primary Care Provider: Lemuel Gustafson   Date and time admitted to hospital: 8/5/2021  5:54 PM    * Acute cystitis without hematuria  Assessment & Plan  · Acute UTI with generalized malaise  Could not tolerate ciprofloxacin as prescribed on outpatient with nausea/vomiting  · Urine culture with mixed contaminants as infection was partially treated prior to admission  Continue cefepime for now currently antibiotic day 4   · PT/OT recommending home services    Chronic anemia  Assessment & Plan  · Chronic anemia; did have drop in hemoglobin  · No evidence of bleeding in abdomen/pelvis  · Ongoing debility will transfuse 2 unit PRBC check TSH    Results from last 7 days   Lab Units 08/07/21  0750 08/07/21  0459 08/06/21  0549 08/05/21  1822   HEMOGLOBIN g/dL 7 4* 6 8* 7 5* 9 1*       Constipation  Assessment & Plan  · Likely responsible for the patient's nausea; continue docusate/senna with miralax  Severe protein-calorie malnutrition ([address] )  Assessment & Plan  Malnutrition Findings:   Adult Malnutrition type: Acute illness (related to condition as evidenced by < 50% energy intake > 5 days, severe muscle loss to pectoralis, deltoid and interroseous muscles  Severe fat loss to orbitals )  Adult Degree of Malnutrition: Other severe protein calorie malnutrition (Treated with: Dental soft, 2 gm Na diet  +Ensure compact BID  Consider ST evaluation  Recommend daily weights )  BMI Findings:  Adult BMI Classifications: Underweight < 18 5  Body mass index is 17 51 kg/m²  Thrombocytopenia ([address] )  Assessment & Plan  · Very mild thrombocytopenia likely secondary to acute infection    No evidence of bleeding    Results from last 7 days   Lab Units 08/08/21  0515 08/07/21  0459 08/06/21  0549 08/05/21  1822   PLATELETS Thousands/uL 95* 88* 90* 129*       Hiatal hernia  Assessment & Plan  · Continue pantoprazole    Parkinson's disease ([address] )  Assessment & Plan  · Continue sinemet and xadago    History of transcatheter aortic valve replacement (TAVR)  Assessment & Plan  · 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  Did have endarterectomy for femoral occlusion  · Continue aspirin and clopidogrel but monitor hemoglobin closely    VTE Pharmacologic Prophylaxis: VTE Score: 9 High Risk (Score >/= 5) - Pharmacological DVT Prophylaxis Ordered: Heparin  Sequential Compression Devices Ordered  Patient Centered Rounds: I have performed bedside rounds with nursing staff today  Discussions with Specialists or Other Care Team Provider:     Education and Discussions with Family / Patient:  Daughter-in-law on telephone    Time Spent for Care: 25 mins  More than 50% of total time spent on counseling and coordination of care as described above  Current Length of Stay: 3 day(s)  Current Patient Status: Inpatient   Certification Statement: The patient will continue to require additional inpatient hospital stay due to UTI and anemia  Discharge Plan / Estimated Discharge Date: 24-48 hours    Code Status: Level 3 - DNAR and DNI      Subjective:   Patient seen and examined  Feeling very tired and weak  Sleepy  Had large bowel movement yesterday    Objective:   Vitals: Blood pressure 126/63, pulse 63, temperature 98 5 °F (36 9 °C), resp  rate 18, height 5' 5" (1 651 m), weight 47 7 kg (105 lb 3 2 oz), SpO2 100 %  Physical Exam  Vitals reviewed  Constitutional:       General: She is not in acute distress  Appearance: Normal appearance  Cardiovascular:      Rate and Rhythm: Regular rhythm  Heart sounds: Murmur heard  Pulmonary:      Breath sounds: Normal breath sounds  No wheezing  Abdominal:      General: Bowel sounds are normal       Palpations: Abdomen is soft  Tenderness: There is no guarding or rebound  Musculoskeletal:         General: No swelling  Skin:     General: Skin is warm  Neurological:      Mental Status: She is alert  Mental status is at baseline  Additional Data:   Labs:  Results from last 7 days   Lab Units 08/08/21  0515 08/07/21  0750 08/07/21  0459 08/06/21  0549 08/05/21  1822   WBC Thousand/uL 3 57*  --  3 14* 3 73* 5 80   HEMOGLOBIN g/dL 7 3* 7 4* 6 8* 7 5* 9 1*   HEMATOCRIT % 23 3* 22 7* 21 3* 23 1* 28 4*   MCV fL 98  --  97 98 97   PLATELETS Thousands/uL 95*  --  88* 90* 129*   INR   --   --   --   --  1 01     Results from last 7 days   Lab Units 08/08/21  0515 08/07/21  0459 08/06/21  0549 08/05/21  1822   SODIUM mmol/L 138 139 140 134*   POTASSIUM mmol/L 3 7 3 7 4 0 4 3   CHLORIDE mmol/L 105 105 106 100   CO2 mmol/L 23 25 25 27   ANION GAP mmol/L 10 9 9 7   BUN mg/dL 11 10 13 15   CREATININE mg/dL 0 79 0 79 0 84 1 05   CALCIUM mg/dL 7 6* 7 4* 7 6* 8 6   ALBUMIN g/dL 2 7* 2 7*  --  3 9   TOTAL BILIRUBIN mg/dL 1 24* 1 15*  --  1 95*   ALK PHOS U/L 92 89  --  119*   ALT U/L 7* 8*  --  9*   AST U/L 43 39  --  50*   EGFR ml/min/1 73sq m 68 68 63 48   GLUCOSE RANDOM mg/dL 47* 70 69 87     Results from last 7 days   Lab Units 08/05/21  1822   MAGNESIUM mg/dL 2 2     Results from last 7 days   Lab Units 08/05/21  1822   TROPONIN I ng/mL <0 02          Results from last 7 days   Lab Units 08/06/21  0549 08/05/21  1822   LACTIC ACID mmol/L  --  1 0   PROCALCITONIN ng/ml <0 05 <0 05     Results from last 7 days   Lab Units 08/08/21  0618   POC GLUCOSE mg/dl 77             * I Have Reviewed All Lab Data Listed Above  Cultures:   Results from last 7 days   Lab Units 08/05/21  1844 08/05/21  1822   BLOOD CULTURE   --  No Growth at 48 hrs  No Growth at 48 hrs     URINE CULTURE  <10,000 cfu/ml   --                  Lines/Drains:  Invasive Devices     Peripheral Intravenous Line            Peripheral IV 08/05/21 Left Antecubital 2 days    Peripheral IV 08/08/21 Forearm <1 day              Telemetry:      Imaging:  Imaging Reports Reviewed Today Include:   XR chest portable    Result Date: 8/6/2021  Impression: No acute cardiopulmonary disease  Workstation performed: [id number]     CT abdomen pelvis with contrast    Result Date: 8/5/2021  Impression: No acute inflammatory stranding No bowel obstruction There is small amount of free fluid in the pelvis  ,  Likely corresponds to abnormality described at CT from July 2, 2021 performed [facility] No abnormal bowel wall thickening, or pneumatosis or free air Large hiatal hernia No new intra-abdominal hematoma I personally discussed this study with Александр Dove on 8/5/2021 at 8:10 PM  Workstation performed: [id number]     Scheduled Meds:  Current Facility-Administered Medications   Medication Dose Route Frequency Provider Last Rate    acetaminophen  650 mg Oral Q6H PRN Arno Sprain, CRNP      aspirin  81 mg Oral Daily Arno Sprain, CRNP      atorvastatin  80 mg Oral Daily With Ryan Lester CRNP      carbidopa-levodopa  1 tablet Oral 4x Daily Kerry S Roberto, CRNP      carbidopa-levodopa  1 tablet Oral HS Kerry S Roberto, CRNP      cefepime  1,000 mg Intravenous Q12H Arno Sprain, CRNP 1,000 mg (08/08/21 0619)    clopidogrel  75 mg Oral Daily Arno Sprain, CRNP      heparin (porcine)  5,000 Units Subcutaneous [facility] Arno Sprain, CRNP      ondansetron  4 mg Intravenous Q4H PRN Yaritza Lo, DO      pantoprazole  40 mg Oral Early Morning Arno Sprain, CRNP      polyethylene glycol  17 g Oral BID Yaritza Lo, DO      promethazine  12 5 mg Intravenous Q6H PRN Yaritza Lo, DO      Safinamide Mesylate  100 mg Oral Daily Yaritza Dora, DO      senna-docusate sodium  1 tablet Oral HS Kerry S Roberto, CRNP      sodium chloride  50 mL/hr Intravenous Continuous Arno Sprain, CRNP Stopped (08/08/21 1140)       DO Liz Swanson [facility] Internal Medicine  Hospitalist    ** Please Note: This note has been constructed using a voice recognition system   **

## 2021-08-08 NOTE — ASSESSMENT & PLAN NOTE
· Acute UTI with generalized malaise  Could not tolerate ciprofloxacin as prescribed on outpatient with nausea/vomiting  · Urine culture with mixed contaminants as infection was partially treated prior to admission  Currently day five cefepime  Will discontinue  · Still having intractable nausea/vomiting    Will monitor off antibiotics and have GI evaluate  · PT/OT recommending home services

## 2021-08-08 NOTE — NURSING NOTE
Patient tolerating OOB to recliner, still c/o nausea  antiemetic given, encouraged frequent small meals, c/o no appetite

## 2021-08-08 NOTE — ASSESSMENT & PLAN NOTE
· Initially thought responsible for the patient's nausea; continue docusate/senna with miralax  · Given ongoing nausea will have GI evaluate    Will DC further antibiotics as this may be contributing to nausea as well

## 2021-08-09 PROBLEM — I48.91 NEW ONSET ATRIAL FIBRILLATION (HCC): Status: ACTIVE | Noted: 2021-08-09

## 2021-08-09 LAB
ABO GROUP BLD BPU: NORMAL
ABO GROUP BLD BPU: NORMAL
ALBUMIN SERPL BCP-MCNC: 2.7 G/DL (ref 3.5–5)
ALP SERPL-CCNC: 92 U/L (ref 46–116)
ALT SERPL W P-5'-P-CCNC: 7 U/L (ref 12–78)
ANION GAP SERPL CALCULATED.3IONS-SCNC: 7 MMOL/L (ref 4–13)
AST SERPL W P-5'-P-CCNC: 52 U/L (ref 5–45)
BILIRUB SERPL-MCNC: 1.89 MG/DL (ref 0.2–1)
BPU ID: NORMAL
BPU ID: NORMAL
BUN SERPL-MCNC: 9 MG/DL (ref 5–25)
CALCIUM ALBUM COR SERPL-MCNC: 8.6 MG/DL (ref 8.3–10.1)
CALCIUM SERPL-MCNC: 7.6 MG/DL (ref 8.3–10.1)
CHLORIDE SERPL-SCNC: 105 MMOL/L (ref 100–108)
CO2 SERPL-SCNC: 27 MMOL/L (ref 21–32)
CREAT SERPL-MCNC: 0.82 MG/DL (ref 0.6–1.3)
CROSSMATCH: NORMAL
CROSSMATCH: NORMAL
ERYTHROCYTE [DISTWIDTH] IN BLOOD BY AUTOMATED COUNT: 20.9 % (ref 11.6–15.1)
GFR SERPL CREATININE-BSD FRML MDRD: 65 ML/MIN/1.73SQ M
GLUCOSE SERPL-MCNC: 73 MG/DL (ref 65–140)
HCT VFR BLD AUTO: 28.7 % (ref 34.8–46.1)
HGB BLD-MCNC: 9.6 G/DL (ref 11.5–15.4)
MCH RBC QN AUTO: 31.5 PG (ref 26.8–34.3)
MCHC RBC AUTO-ENTMCNC: 33.4 G/DL (ref 31.4–37.4)
MCV RBC AUTO: 94 FL (ref 82–98)
PLATELET # BLD AUTO: 88 THOUSANDS/UL (ref 149–390)
POTASSIUM SERPL-SCNC: 3.8 MMOL/L (ref 3.5–5.3)
PROT SERPL-MCNC: 4.9 G/DL (ref 6.4–8.2)
QRS AXIS: -46 DEGREES
QRSD INTERVAL: 88 MS
QT INTERVAL: 322 MS
QTC INTERVAL: 501 MS
RBC # BLD AUTO: 3.05 MILLION/UL (ref 3.81–5.12)
SODIUM SERPL-SCNC: 139 MMOL/L (ref 136–145)
T WAVE AXIS: 151 DEGREES
UNIT DISPENSE STATUS: NORMAL
UNIT DISPENSE STATUS: NORMAL
UNIT PRODUCT CODE: NORMAL
UNIT PRODUCT CODE: NORMAL
UNIT PRODUCT VOLUME: 350 ML
UNIT PRODUCT VOLUME: 350 ML
UNIT RH: NORMAL
UNIT RH: NORMAL
VENTRICULAR RATE: 146 BPM
WBC # BLD AUTO: 4.57 THOUSAND/UL (ref 4.31–10.16)

## 2021-08-09 PROCEDURE — 80053 COMPREHEN METABOLIC PANEL: CPT | Performed by: INTERNAL MEDICINE

## 2021-08-09 PROCEDURE — 82272 OCCULT BLD FECES 1-3 TESTS: CPT | Performed by: INTERNAL MEDICINE

## 2021-08-09 PROCEDURE — 99232 SBSQ HOSP IP/OBS MODERATE 35: CPT | Performed by: INTERNAL MEDICINE

## 2021-08-09 PROCEDURE — 93005 ELECTROCARDIOGRAM TRACING: CPT

## 2021-08-09 PROCEDURE — 85027 COMPLETE CBC AUTOMATED: CPT | Performed by: INTERNAL MEDICINE

## 2021-08-09 RX ORDER — DIGOXIN 0.25 MG/ML
250 INJECTION INTRAMUSCULAR; INTRAVENOUS ONCE
Status: DISCONTINUED | OUTPATIENT
Start: 2021-08-09 | End: 2021-08-09

## 2021-08-09 RX ORDER — METOPROLOL TARTRATE 5 MG/5ML
5 INJECTION INTRAVENOUS ONCE
Status: COMPLETED | OUTPATIENT
Start: 2021-08-09 | End: 2021-08-09

## 2021-08-09 RX ORDER — METOPROLOL SUCCINATE 25 MG/1
25 TABLET, EXTENDED RELEASE ORAL DAILY
Status: DISCONTINUED | OUTPATIENT
Start: 2021-08-09 | End: 2021-08-09

## 2021-08-09 RX ORDER — METOPROLOL TARTRATE 5 MG/5ML
5 INJECTION INTRAVENOUS ONCE
Status: DISCONTINUED | OUTPATIENT
Start: 2021-08-09 | End: 2021-08-09

## 2021-08-09 RX ORDER — DIGOXIN 0.25 MG/ML
500 INJECTION INTRAMUSCULAR; INTRAVENOUS ONCE
Status: DISCONTINUED | OUTPATIENT
Start: 2021-08-09 | End: 2021-08-09

## 2021-08-09 RX ORDER — METOPROLOL TARTRATE 5 MG/5ML
2.5 INJECTION INTRAVENOUS ONCE
Status: COMPLETED | OUTPATIENT
Start: 2021-08-09 | End: 2021-08-09

## 2021-08-09 RX ADMIN — POLYETHYLENE GLYCOL 3350 17 G: 17 POWDER, FOR SOLUTION ORAL at 08:35

## 2021-08-09 RX ADMIN — CARBIDOPA AND LEVODOPA 1 TABLET: 50; 200 TABLET, EXTENDED RELEASE ORAL at 21:25

## 2021-08-09 RX ADMIN — CARBIDOPA AND LEVODOPA 1 TABLET: 50; 200 TABLET, EXTENDED RELEASE ORAL at 16:24

## 2021-08-09 RX ADMIN — SODIUM CHLORIDE 50 ML/HR: 0.9 INJECTION, SOLUTION INTRAVENOUS at 18:25

## 2021-08-09 RX ADMIN — CEFEPIME HYDROCHLORIDE 1000 MG: 1 INJECTION, SOLUTION INTRAVENOUS at 05:19

## 2021-08-09 RX ADMIN — HEPARIN SODIUM 5000 UNITS: 5000 INJECTION INTRAVENOUS; SUBCUTANEOUS at 14:21

## 2021-08-09 RX ADMIN — PANTOPRAZOLE SODIUM 40 MG: 40 TABLET, DELAYED RELEASE ORAL at 05:19

## 2021-08-09 RX ADMIN — CARBIDOPA AND LEVODOPA 1 TABLET: 50; 200 TABLET, EXTENDED RELEASE ORAL at 11:23

## 2021-08-09 RX ADMIN — METOPROLOL TARTRATE 2.5 MG: 5 INJECTION INTRAVENOUS at 06:52

## 2021-08-09 RX ADMIN — ONDANSETRON 4 MG: 2 INJECTION INTRAMUSCULAR; INTRAVENOUS at 02:06

## 2021-08-09 RX ADMIN — CARBIDOPA AND LEVODOPA 1 TABLET: 50; 200 TABLET, EXTENDED RELEASE ORAL at 02:14

## 2021-08-09 RX ADMIN — CLOPIDOGREL BISULFATE 75 MG: 75 TABLET ORAL at 08:32

## 2021-08-09 RX ADMIN — METOPROLOL TARTRATE 5 MG: 5 INJECTION INTRAVENOUS at 07:58

## 2021-08-09 RX ADMIN — ASPIRIN 81 MG: 81 TABLET, COATED ORAL at 08:32

## 2021-08-09 RX ADMIN — SAFINAMIDE MESYLATE 100 MG: 100 TABLET, FILM COATED ORAL at 08:33

## 2021-08-09 RX ADMIN — HEPARIN SODIUM 5000 UNITS: 5000 INJECTION INTRAVENOUS; SUBCUTANEOUS at 05:19

## 2021-08-09 RX ADMIN — METOPROLOL TARTRATE 25 MG: 25 TABLET, FILM COATED ORAL at 21:25

## 2021-08-09 RX ADMIN — ONDANSETRON 4 MG: 2 INJECTION INTRAMUSCULAR; INTRAVENOUS at 06:20

## 2021-08-09 RX ADMIN — METOPROLOL SUCCINATE 25 MG: 25 TABLET, EXTENDED RELEASE ORAL at 09:53

## 2021-08-09 RX ADMIN — CARBIDOPA AND LEVODOPA 1 TABLET: 50; 200 TABLET, EXTENDED RELEASE ORAL at 06:14

## 2021-08-09 RX ADMIN — ONDANSETRON 4 MG: 2 INJECTION INTRAMUSCULAR; INTRAVENOUS at 17:08

## 2021-08-09 RX ADMIN — PROMETHAZINE HYDROCHLORIDE 12.5 MG: 25 INJECTION INTRAMUSCULAR; INTRAVENOUS at 20:02

## 2021-08-09 RX ADMIN — ATORVASTATIN CALCIUM 80 MG: 40 TABLET, FILM COATED ORAL at 16:24

## 2021-08-09 RX ADMIN — HEPARIN SODIUM 5000 UNITS: 5000 INJECTION INTRAVENOUS; SUBCUTANEOUS at 21:25

## 2021-08-09 RX ADMIN — SODIUM CHLORIDE 500 ML: 0.9 INJECTION, SOLUTION INTRAVENOUS at 07:05

## 2021-08-09 NOTE — PROGRESS NOTES
[address]  Progress Note Margi Walton 1934, 80 y o  female MRN: [id number]  Unit/Bed#: -Kirsten Encounter: [id number]  Primary Care Provider: Gael Betts   Date and time admitted to hospital: 8/5/2021  5:54 PM    * Acute cystitis without hematuria  Assessment & Plan  · Acute UTI with generalized malaise  Could not tolerate ciprofloxacin as prescribed on outpatient with nausea/vomiting  · Urine culture with mixed contaminants as infection was partially treated prior to admission  Currently day five cefepime  Will discontinue  · Still having intractable nausea/vomiting  Will monitor off antibiotics and have GI evaluate  · PT/OT recommending home services    Chronic anemia  Assessment & Plan  · Chronic anemia; did have drop in hemoglobin  · No evidence of bleeding in abdomen/pelvis  Hemoccult still pending  · Transfused 2 unit PRBC yesterday with appropriate rise    Results from last 7 days   Lab Units 08/09/21  0526 08/08/21  0515 08/07/21  0750 08/07/21  0459 08/06/21  0549 08/05/21  1822   HEMOGLOBIN g/dL 9 6* 7 3* 7 4* 6 8* 7 5* 9 1*       New onset atrial fibrillation (HCC)  Assessment & Plan  · New onset atrial fibrillation overnight  Required IV metoprolol x2  · Started on metoprolol 25 mg with conversion  Cardiology consulted  · Elevated chadsvasc -2 score, will need to decide on anticoagulation prior to discharge    Constipation  Assessment & Plan  · Initially thought responsible for the patient's nausea; continue docusate/senna with miralax  · Given ongoing nausea will have GI evaluate  Will DC further antibiotics as this may be contributing to nausea as well    Severe protein-calorie malnutrition ([facility]CC)  Assessment & Plan  Malnutrition Findings:   Adult Malnutrition type: Acute illness (related to condition as evidenced by < 50% energy intake > 5 days, severe muscle loss to pectoralis, deltoid and interroseous muscles   Severe fat loss to orbitals )  Adult Degree of Malnutrition: Other severe protein calorie malnutrition (Treated with: Dental soft, 2 gm Na diet  +Ensure compact BID  Consider ST evaluation  Recommend daily weights )  BMI Findings:  Adult BMI Classifications: Underweight < 18 5  Body mass index is 17 54 kg/m²  Thrombocytopenia ([facility] [address] )  Assessment & Plan  · Very mild thrombocytopenia likely secondary to acute infection  No evidence of bleeding    Results from last 7 days   Lab Units 08/09/21  0526 08/08/21  0515 08/07/21  0459 08/06/21  0549 08/05/21  1822   PLATELETS Thousands/uL 88* 95* 88* 90* 129*       Hiatal hernia  Assessment & Plan  · Continue pantoprazole    Parkinson's disease ([facility] [address] )  Assessment & Plan  · Continue sinemet and xadago    History of transcatheter aortic valve replacement (TAVR)  Assessment & Plan  · History of 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 at [facility]  Did have endarterectomy for femoral occlusion  · Continue aspirin and clopidogrel but monitor hemoglobin closely      VTE Pharmacologic Prophylaxis: VTE Score: 9 High Risk (Score >/= 5) - Pharmacological DVT Prophylaxis Ordered: Heparin  Sequential Compression Devices Ordered  Patient Centered Rounds: I have performed bedside rounds with nursing staff today  Discussions with Specialists or Other Care Team Provider:  Cardiology    Education and Discussions with Family / Patient:  Son at bedside    Time Spent for Care: 25 mins  More than 50% of total time spent on counseling and coordination of care as described above  Current Length of Stay: 4 day(s)  Current Patient Status: Inpatient   Certification Statement: The patient will continue to require additional inpatient hospital stay due to new atrial fibrillation and ongoing nausea/vomiting  Discharge Plan / Estimated Discharge Date: 24-48 hours    Code Status: Level 3 - DNAR and DNI      Subjective:   Patient seen and examined  Went into new onset atrial fibrillation last night requiring IV metoprolol    Still nauseous with vomiting  Objective:   Vitals: Blood pressure 101/63, pulse (!) 136, temperature 98 6 °F (37 °C), resp  rate 16, height 5' 5" (1 651 m), weight 47 8 kg (105 lb 6 4 oz), SpO2 97 %  Physical Exam  Vitals reviewed  Constitutional:       General: She is not in acute distress  Appearance: Normal appearance  Cardiovascular:      Rate and Rhythm: Regular rhythm  Heart sounds: Normal heart sounds  Pulmonary:      Breath sounds: Decreased breath sounds present  No wheezing  Abdominal:      General: Bowel sounds are normal       Palpations: Abdomen is soft  Tenderness: There is no guarding or rebound  Musculoskeletal:         General: No swelling  Skin:     General: Skin is warm  Neurological:      Mental Status: She is alert and oriented to person, place, and time  Mental status is at baseline     Psychiatric:         Mood and Affect: Mood normal        Additional Data:   Labs:  Results from last 7 days   Lab Units 08/09/21  0526 08/08/21  0515 08/07/21  0750 08/07/21  0459 08/06/21  0549 08/05/21  1822   WBC Thousand/uL 4 57 3 57*  --  3 14* 3 73* 5 80   HEMOGLOBIN g/dL 9 6* 7 3* 7 4* 6 8* 7 5* 9 1*   HEMATOCRIT % 28 7* 23 3* 22 7* 21 3* 23 1* 28 4*   MCV fL 94 98  --  97 98 97   PLATELETS Thousands/uL 88* 95*  --  88* 90* 129*   INR   --   --   --   --   --  1 01     Results from last 7 days   Lab Units 08/09/21  0526 08/08/21  0515 08/07/21  0459 08/06/21  0549 08/05/21  1822   SODIUM mmol/L 139 138 139 140 134*   POTASSIUM mmol/L 3 8 3 7 3 7 4 0 4 3   CHLORIDE mmol/L 105 105 105 106 100   CO2 mmol/L 27 23 25 25 27   ANION GAP mmol/L 7 10 9 9 7   BUN mg/dL 9 11 10 13 15   CREATININE mg/dL 0 82 0 79 0 79 0 84 1 05   CALCIUM mg/dL 7 6* 7 6* 7 4* 7 6* 8 6   ALBUMIN g/dL 2 7* 2 7* 2 7*  --  3 9   TOTAL BILIRUBIN mg/dL 1 89* 1 24* 1 15*  --  1 95*   ALK PHOS U/L 92 92 89  --  119*   ALT U/L 7* 7* 8*  --  9*   AST U/L 52* 43 39  --  50*   EGFR ml/min/1 73sq m 65 68 68 63 48 GLUCOSE RANDOM mg/dL 73 47* 70 69 87     Results from last 7 days   Lab Units 08/05/21  1822   MAGNESIUM mg/dL 2 2     Results from last 7 days   Lab Units 08/05/21  1822   TROPONIN I ng/mL <0 02          Results from last 7 days   Lab Units 08/06/21  0549 08/05/21  1822   LACTIC ACID mmol/L  --  1 0   PROCALCITONIN ng/ml <0 05 <0 05     Results from last 7 days   Lab Units 08/08/21  0618   POC GLUCOSE mg/dl 77         Results from last 7 days   Lab Units 08/08/21  1638   TSH 3RD GENERATON uIU/mL 1 699     * I Have Reviewed All Lab Data Listed Above  Cultures:   Results from last 7 days   Lab Units 08/05/21  1844 08/05/21  1822   BLOOD CULTURE   --  No Growth at 72 hrs  No Growth at 72 hrs  URINE CULTURE  <10,000 cfu/ml   --                  Lines/Drains:  Invasive Devices     Peripheral Intravenous Line            Peripheral IV 08/08/21 Right Forearm <1 day              Telemetry:   Telemetry Orders (From admission, onward)             48 Hour Telemetry Monitoring  Continuous x 48 hours     Question:  Reason for 48 Hour Telemetry  Answer:  Arrhythmias Requiring Medical Therapy (eg  SVT, Vtach/fib, Bradycardia, Uncontrolled A-fib)                  Imaging:  Imaging Reports Reviewed Today Include:   XR chest portable    Result Date: 8/6/2021  Impression: No acute cardiopulmonary disease  Workstation performed: [id number]     CT abdomen pelvis with contrast    Result Date: 8/5/2021  Impression: No acute inflammatory stranding No bowel obstruction There is small amount of free fluid in the pelvis  ,  Likely corresponds to abnormality described at CT from July 2, 2021 performed at [facility] No abnormal bowel wall thickening, or pneumatosis or free air Large hiatal hernia No new intra-abdominal hematoma I personally discussed this study with Vicente HUFF on 8/5/2021 at 8:10 PM  Workstation performed: [id number]     Scheduled Meds:  Current Facility-Administered Medications   Medication Dose Route Frequency Provider Last Rate    acetaminophen  650 mg Oral Q6H PRN Reggy Nose, CRNP      aspirin  81 mg Oral Daily Reggy Nose, CRNP      atorvastatin  80 mg Oral Daily With [employer], CRNP      carbidopa-levodopa  1 tablet Oral 4x Daily Kerry S Roberto, CRNP      carbidopa-levodopa  1 tablet Oral HS Kerry S Roberto, CRNP      cefepime  1,000 mg Intravenous Q12H Reggy Nose, CRNP 1,000 mg (08/09/21 0519)    clopidogrel  75 mg Oral Daily Reggy Nose, CRNP      heparin (porcine)  5,000 Units Subcutaneous [facility] Reggy Nose, CRNP      ondansetron  4 mg Intravenous Q4H PRN Emelyn Reed, DO      pantoprazole  40 mg Oral Early Morning Reggy Nose, CRNP      polyethylene glycol  17 g Oral BID Emelyn Reed, DO      promethazine  12 5 mg Intravenous Q6H PRN Emeyln Reed, DO      Safinamide Mesylate  100 mg Oral Daily Emelyn Reed,       senna-docusate sodium  1 tablet Oral HS Kerry S Roberto, CRNP      sodium chloride  50 mL/hr Intravenous Continuous Reggy Nose, CRNP 50 mL/hr (08/09/21 0819)       DO Liz Oscar [facility] Internal Medicine  Hospitalist    ** Please Note: This note has been constructed using a voice recognition system   **

## 2021-08-09 NOTE — CONSULTS
Consult received for pt not have an appetite  Had ordered ensure compact BID for pt though says she only likes vanilla with a small amount of chocolate mixed in  Will adjust to ensure compact chocolate daily at B, ensure compact vanilla TID, pt to mix herself  Sampled this during discussion and enjoyed, agreeable to this regimen  Encouraged po intake to help prevent nausea/not take medications on empty stomach  Had poor intake > 2 weeks PTA, continued poor intake during admission  Severe acute malnutrition  If intake does not improve, may need to consider nutrition support  Please continue with daily weights  Will maintain diet as ordered at this time  Thank you for the consult, will continue to follow with pt

## 2021-08-09 NOTE — PLAN OF CARE
Problem: Nutrition/Hydration-ADULT  Goal: Nutrient/Hydration intake appropriate for improving, restoring or maintaining nutritional needs  Description: Monitor and assess patient's nutrition/hydration status for malnutrition  Collaborate with interdisciplinary team and initiate plan and interventions as ordered  Monitor patient's weight and dietary intake as ordered or per policy  Utilize nutrition screening tool and intervene as necessary  Determine patient's food preferences and provide high-protein, high-caloric foods as appropriate       INTERVENTIONS:  - Monitor oral intake, urinary output, labs, and treatment plans  - Assess nutrition and hydration status and recommend course of action  - Evaluate amount of meals eaten  - Allow adequate time for meals  - Recommend/ encourage appropriate diets, oral nutritional supplements, and vitamin/mineral supplements  - Order, calculate, and assess calorie counts as needed  - Assess need for intravenous fluids  - Provide specific nutrition/hydration education as appropriate  - Include patient/family/caregiver in decisions related to nutrition  Outcome: Progressing none

## 2021-08-09 NOTE — ASSESSMENT & PLAN NOTE
· New onset atrial fibrillation overnight  Required IV metoprolol x2  · Started on metoprolol 25 mg with conversion  Cardiology consulted    · Elevated chadsvasc -2 score, will need to decide on anticoagulation prior to discharge

## 2021-08-09 NOTE — QUICK NOTE
Called to see patient with new onset rapid, irregular HR  EKG showing afib with   IV lopressor ordered, cardiology c/s

## 2021-08-09 NOTE — UTILIZATION REVIEW
Continued Stay Review    Date: 8/7                         Current Patient Class: IP  Current Level of Care: MS    HPI:86 y o  female initially admitted on 8/5 with weakness, nausea, vomiting  Admitted to inpatient for acute cystitis with hematuria, parkinsons, thrombocytopenia    Assessment/Plan:   Pt is admitted with acute cystitis w/o hematuria  Continues on IV antibiotics - IV Cefepime and IV fluids  Still c/o some nausea she was treated with IV Zofran x 2 and IV Phenergan  Pt has constipation and is treated with docusate, senna and Miralax  Was seen by PT/OT who are recommending home with in home services  Has HGB 6 8 and was transfused with 2 U PRBCs  Repeat H/h 7 4/22 7  She was OOB to the chair today        Vital Signs:   08/07/21 22:31:18  98 2 °F (36 8 °C)  61  16  116/59  78  96 %  --   08/07/21 14:12:17  97 9 °F (36 6 °C)  58  18  136/63  87  99 %  --   08/07/21 0900  --  --  --  --  --  --  None (Room air)   08/07/21 06:26:41  97 7 °F (36 5 °C)  63  15  113/59  77  98 %  --        Pertinent Labs/Diagnostic Results:       Results from last 7 days   Lab Units 08/07/21  0750 08/07/21  0459 08/06/21  0549 08/05/21  1822   WBC Thousand/uL  --  3 14* 3 73* 5 80   HEMOGLOBIN g/dL 7 4* 6 8* 7 5* 9 1*   HEMATOCRIT % 22 7* 21 3* 23 1* 28 4*   PLATELETS Thousands/uL  --  88* 90* 129*   NEUTROS ABS Thousands/µL  --   --   --  4 59         Results from last 7 days   Lab Units 08/07/21  0459 08/06/21  0549 08/05/21  1822   SODIUM mmol/L 139 140 134*   POTASSIUM mmol/L 3 7 4 0 4 3   CHLORIDE mmol/L 105 106 100   CO2 mmol/L 25 25 27   ANION GAP mmol/L 9 9 7   BUN mg/dL 10 13 15   CREATININE mg/dL 0 79 0 84 1 05   EGFR ml/min/1 73sq m 68 63 48   CALCIUM mg/dL 7 4* 7 6* 8 6   MAGNESIUM mg/dL  --   --  2 2     Results from last 7 days   Lab Units 08/07/21  0459 08/05/21  1822   AST U/L 39 50*   ALT U/L 8* 9*   ALK PHOS U/L 89 119*   TOTAL PROTEIN g/dL 4 7* 6 6   ALBUMIN g/dL 2 7* 3 9   TOTAL BILIRUBIN mg/dL 1 15* 1 95*     Results from last 7 days   Lab Units 08/08/21  0618   POC GLUCOSE mg/dl 77     Results from last 7 days   Lab Units 08/07/21  0459 08/06/21  0549 08/05/21  1822   GLUCOSE RANDOM mg/dL 70 69 87     Results from last 7 days   Lab Units 08/05/21  1822   TROPONIN I ng/mL <0 02         Results from last 7 days   Lab Units 08/05/21  1822   PROTIME seconds 13 1   INR  1 01   PTT seconds 28     Results from last 7 days   Lab Units 08/08/21  1638   TSH 3RD GENERATON uIU/mL 1 699     Results from last 7 days   Lab Units 08/06/21  0549 08/05/21  1822   PROCALCITONIN ng/ml <0 05 <0 05     Results from last 7 days   Lab Units 08/05/21  1822   LACTIC ACID mmol/L 1 0     Results from last 7 days   Lab Units 08/07/21  0750   FERRITIN ng/mL 269     Results from last 7 days   Lab Units 08/05/21  1844   CLARITY UA  Cloudy   COLOR UA  Lisy   SPEC GRAV UA  1 015   PH UA  6 0   GLUCOSE UA mg/dl Negative   KETONES UA mg/dl Trace*   BLOOD UA  Trace-lysed*   PROTEIN UA mg/dl Trace*   NITRITE UA  Negative   BILIRUBIN UA  Small*   UROBILINOGEN UA E U /dl 0 2   LEUKOCYTES UA  Trace*   WBC UA /hpf 10-20*   RBC UA /hpf 0-5   BACTERIA UA /hpf Moderate*   EPITHELIAL CELLS WET PREP /hpf None Seen     Results from last 7 days   Lab Units 08/05/21  1844 08/05/21  1822   BLOOD CULTURE   --  No Growth at 72 hrs  No Growth at 72 hrs     URINE CULTURE  <10,000 cfu/ml   --      Medications:   Scheduled Medications:  aspirin, 81 mg, Oral, Daily  atorvastatin, 80 mg, Oral, Daily With Dinner  carbidopa-levodopa, 1 tablet, Oral, 4x Daily  carbidopa-levodopa, 1 tablet, Oral, HS  Cefepime 2000 mg IV q 12 hr   clopidogrel, 75 mg, Oral, Daily  heparin (porcine), 5,000 Units, Subcutaneous, Q8H SWATI  metoprolol tartrate, 25 mg, Oral, Q12H SWATI  pantoprazole, 40 mg, Oral, Early Morning  polyethylene glycol, 17 g, Oral, BID  Safinamide Mesylate, 100 mg, Oral, Daily  senna-docusate sodium, 1 tablet, Oral, HS      Continuous IV Infusions:  sodium chloride, 50 mL/hr, Intravenous, Continuous      PRN Meds:  acetaminophen, 650 mg, Oral, Q6H PRN  ondansetron, 4 mg, Intravenous, Q4H PRN - x 2 8/7  promethazine, 12 5 mg, Intravenous, Q6H PRN -x 1 8/7    Discharge Plan: home with home services  Network Utilization Review Department  ATTENTION: Please call with any questions or concerns to [phone] and carefully listen to the prompts so that you are directed to the right person  All voicemails are confidential   Cloyde Havers all requests for admission clinical reviews, approved or denied determinations and any other requests to dedicated fax number below belonging to the campus where the patient is receiving treatment   List of dedicated fax numbers for the Facilities:  [address] DENIALS (Administrative/Medical Necessity) [phone]   [address] (Maternity/NICU/Pediatrics) [phone]   [address] Roosevelt Espinosael Dean [phone] [place] [address] [phone] [facility] [phone]

## 2021-08-09 NOTE — PLAN OF CARE
Problem: Potential for Falls  Goal: Patient will remain free of falls  Description: INTERVENTIONS:  - Educate patient/family on patient safety including physical limitations  - Instruct patient to call for assistance with activity , roller walker  - Consult OT/PT to assist with strengthening/mobility   - Keep Call bell within reach  - Keep bed low and locked with side rails adjusted as appropriate  - Keep care items and personal belongings within reach  - Initiate and maintain comfort rounds  - Make Fall Risk Sign visible to staff  - Offer Toileting every 4 Hours, in advance of need  - Initiate/Maintain bed alarm  - Obtain necessary fall risk management equipment: bracelet and socks  - Apply yellow socks and bracelet for high fall risk patients  - Consider moving patient to room near nurses station  Outcome: Progressing     Problem: Prexisting or High Potential for Compromised Skin Integrity  Goal: Skin integrity is maintained or improved  Description: INTERVENTIONS:  - Identify patients at risk for skin breakdown  - Assess and monitor skin integrity  - Assess and monitor nutrition and hydration status  - Monitor labs   - Assess for incontinence   - Assist with mobility/ambulation  - Relieve pressure over bony prominences  - Avoid friction and shearing  - Provide appropriate hygiene as needed including keeping skin clean and dry  - Evaluate need for skin moisturizer/barrier cream  - Collaborate with interdisciplinary team   - Patient/family teaching     Outcome: Progressing     Problem: Nutrition/Hydration-ADULT  Goal: Nutrient/Hydration intake appropriate for improving, restoring or maintaining nutritional needs  Description: Monitor and assess patient's nutrition/hydration status for malnutrition  Collaborate with interdisciplinary team and initiate plan and interventions as ordered  Monitor patient's weight and dietary intake as ordered or per policy   Utilize nutrition screening tool and intervene as necessary  Determine patient's food preferences and provide high-protein, high-caloric foods as appropriate       INTERVENTIONS:  - Monitor oral intake, urinary output, labs, and treatment plans  - Assess nutrition and hydration status and recommend course of action  - Evaluate amount of meals eaten  - Allow adequate time for meals  - Recommend/ encourage appropriate diets, oral nutritional supplements, and vitamin/mineral supplements  - Order, calculate, and assess calorie counts as needed  - Assess need for intravenous fluids  - Provide specific nutrition/hydration education as appropriate  - Include patient/family/caregiver in decisions related to nutrition  Outcome: Progressing     Problem: PAIN - ADULT  Goal: Verbalizes/displays adequate comfort level or baseline comfort level  Description: Interventions:  - Encourage patient to monitor pain and request assistance  - Assess pain using appropriate pain scale0-10  - Administer analgesics based on type and severity of pain and evaluate response  - Implement non-pharmacological measures as appropriate and evaluate response  - Consider cultural and social influences on pain and pain management  - Notify physician/advanced practitioner if interventions unsuccessful or patient reports new pain  Outcome: Progressing     Problem: INFECTION - ADULT  Goal: Absence or prevention of progression during hospitalization  Description: INTERVENTIONS:  - Assess and monitor for signs and symptoms of infection  - Monitor lab/diagnostic results  - Monitor all insertion sites  - Administer medications as ordered  - Instruct and encourage patient and family to use good hand hygiene technique  - Identify and instruct in appropriate isolation precautions for identified infection/condition  Outcome: Progressing     Problem: SAFETY ADULT  Goal: Patient will remain free of falls  Description: INTERVENTIONS:  - Educate patient/family on patient safety including physical limitations  - Instruct patient to call for assistance with activity , roller walker  - Consult OT/PT to assist with strengthening/mobility   - Keep Call bell within reach  - Keep bed low and locked with side rails adjusted as appropriate  - Keep care items and personal belongings within reach  - Initiate and maintain comfort rounds  - Make Fall Risk Sign visible to staff  - Offer Toileting every 4 Hours, in advance of need  - Initiate/Maintain bed alarm  - Obtain necessary fall risk management equipment: bracelet and socks  - Apply yellow socks and bracelet for high fall risk patients  - Consider moving patient to room near nurses station  Outcome: Progressing  Goal: Maintain or return to baseline ADL function  Description: INTERVENTIONS:  -  Assess patient's ability to carry out ADLs; assess patient's baseline for ADL function and identify physical deficits which impact ability to perform ADLs (bathing, care of mouth/teeth, toileting, grooming, dressing, etc )  - Assess/evaluate cause of self-care deficits   - Assess range of motion  - Assess patient's mobility; develop plan if impaired  - Assess patient's need for assistive devices and provide as appropriate  - Encourage maximum independence but intervene and supervise when necessary  - Involve family in performance of ADLs  - Assess for home care needs following discharge   - Provide patient education as appropriate  Outcome: Progressing  Goal: Maintains/Returns to pre admission functional level  Description: INTERVENTIONS:  - Perform BMAT or MOVE assessment daily    - Set and communicate daily mobility goal to care team and patient/family/caregiver  - Collaborate with rehabilitation services on mobility goals if consulted  - Perform Range of Motion 4 times a day  - Reposition patient every 4 hours    - Dangle patient 3 times a day  - Stand patient 3 times a day  - Out of bed for toileting  - Record patient progress and toleration of activity level   Outcome: Progressing Problem: DISCHARGE PLANNING  Goal: Discharge to home or other facility with appropriate resources  Description: INTERVENTIONS:  - Identify barriers to discharge w/patient and caregiver  - Arrange for needed discharge resources and transportation as appropriate  - Identify discharge learning needs (meds, wound care, etc )  - Arrange for interpretive services to assist at discharge as needed  - Refer to Case Management Department for coordinating discharge planning if the patient needs post-hospital services based on physician/advanced practitioner order or complex needs related to functional status, cognitive ability, or social support system  Outcome: Progressing     Problem: Knowledge Deficit  Goal: Patient/family/caregiver demonstrates understanding of disease process, treatment plan, medications, and discharge instructions  Description: Complete learning assessment and assess knowledge base    Interventions:  - Provide teaching at level of understanding  - Provide teaching via preferred learning methods  Outcome: Progressing     Problem: DISCHARGE PLANNING - CARE MANAGEMENT  Goal: Discharge to post-acute care or home with appropriate resources  Description: INTERVENTIONS:  - Conduct assessment to determine patient/family and health care team treatment goals, and need for post-acute services based on payer coverage, community resources, and patient preferences, and barriers to discharge  - Address psychosocial, clinical, and financial barriers to discharge as identified in assessment in conjunction with the patient/family and health care team  - Arrange appropriate level of post-acute services according to patient's   needs and preference and payer coverage in collaboration with the physician and health care team  - Communicate with and update the patient/family, physician, and health care team regarding progress on the discharge plan  - Arrange appropriate transportation to post-acute venues  Outcome: Progressing     Problem: MOBILITY - ADULT  Goal: Maintain or return to baseline ADL function  Description: INTERVENTIONS:  -  Assess patient's ability to carry out ADLs; assess patient's baseline for ADL function and identify physical deficits which impact ability to perform ADLs (bathing, care of mouth/teeth, toileting, grooming, dressing, etc )  - Assess/evaluate cause of self-care deficits   - Assess range of motion  - Assess patient's mobility; develop plan if impaired  - Assess patient's need for assistive devices and provide as appropriate  - Encourage maximum independence but intervene and supervise when necessary  - Involve family in performance of ADLs  - Assess for home care needs following discharge   - Provide patient education as appropriate  Outcome: Progressing  Goal: Maintains/Returns to pre admission functional level  Description: INTERVENTIONS:  - Perform BMAT or MOVE assessment daily    - Set and communicate daily mobility goal to care team and patient/family/caregiver  - Collaborate with rehabilitation services on mobility goals if consulted  - Perform Range of Motion 4 times a day  - Reposition patient every 4 hours    - Dangle patient 3 times a day  - Stand patient 3 times a day  - Out of bed for toileting  - Record patient progress and toleration of activity level   Outcome: Progressing     Problem: HEMATOLOGIC - ADULT  Goal: Maintains hematologic stability  Description: INTERVENTIONS  - Assess for signs and symptoms of bleeding or hemorrhage  - Monitor labs  - Administer supportive blood products/factors as ordered and appropriate  Outcome: Progressing

## 2021-08-09 NOTE — CONSULTS
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 [facility]  Sp emergent R Femoral endarterectomy 7/2/21- on plavix and asa  CAD 7/2017 (50% distal Cx and RCA non dominant)  HTN  HLD    Plan:  1  With an elevated Chadsvasc pt should in theory be on eliquis with plavix  Will leave up to the medicine team to initiate given her underlying anemia that has required transfusions  2  Would continue lopressor 25 mg PO BID  3  Monitor on telemetry  4  Obtain records from [facility] regarding most recent echocardiogram    History of Present Illness   Physician Requesting Consult: Samuel Strong DO  Reason for Consult / Principal Problem: afib with rvr  HPI: Danie Hood is a 80y o  year old female with history of TAVR with bio AVR in June at [facility], sp emergent R femoral endarterectomy 7/2/21 (due to perclose device used for hemostasis post TAVR), parkinsonism who is here for acute cystitis  She was on cipro as OP but unable to tolerate  She is now on cefepime  Overnight pt went into rapid afib ventricular rates 146  Lopressor was ordered  She is already on plavix and asa for PAD but did not start eliquis  Rates were uncontrolled and pt was hypotensive  She also was noted to have significant anemia and has had 2 blood transfusions  No specific cause of her anemia is noted but she reports no blood in stool or urine  We placed pt on lopressor 25 TID and digoxin loaded her  In the interm pt converted to SB  Pt was placed back on lopressor 25 mg PO BID and digoxin is now on old  Inpatient consult to Cardiology  Consult performed by: Augusto Schulte PA-C  Consult ordered by: JADA Ford          Review of Systems   Constitutional: Positive for appetite change and fatigue   Negative for chills, fever and unexpected weight change  Respiratory: Negative for cough, shortness of breath and wheezing  Cardiovascular: Negative for chest pain, palpitations and leg swelling  Gastrointestinal: Positive for abdominal pain, nausea and vomiting  Musculoskeletal: Negative for arthralgias  Skin: Negative for color change, pallor and rash  Neurological: Positive for light-headedness  Negative for dizziness and syncope  Psychiatric/Behavioral: Negative for agitation, behavioral problems and confusion  Historical Information   Past Medical History:   Diagnosis Date    [facility]     [facility] )      Past Surgical History:   Procedure Laterality Date    BREAST SURGERY      HYSTERECTOMY      TUBAL LIGATION       Social History     Substance and Sexual Activity   Alcohol Use Not Currently     Social History     Substance and Sexual Activity   Drug Use Not Currently     E-Cigarette/Vaping    E-Cigarette Use Never User      E-Cigarette/Vaping Substances    Nicotine No     THC No     CBD No     Flavoring No     Other No     Unknown No      Social History     Tobacco Use   Smoking Status Never Smoker   Smokeless Tobacco Never Used     Family History: non-contributory    Meds/Allergies   all current active meds have been reviewed  Allergies   Allergen Reactions    Amoxicillin Diarrhea    Ciprofloxacin Vomiting    Erythromycin Diarrhea       Objective   Vitals: Blood pressure 101/63, pulse (!) 136, temperature 98 6 °F (37 °C), resp  rate 16, height 5' 5" (1 651 m), weight 47 8 kg (105 lb 6 4 oz), SpO2 97 %    Orthostatic Blood Pressures      Most Recent Value   Blood Pressure  101/63 filed at 08/09/2021 3093   Patient Position - Orthostatic VS  Lying filed at 08/05/2021 2031            Intake/Output Summary (Last 24 hours) at 8/9/2021 0817  Last data filed at 8/9/2021 0210  Gross per 24 hour   Intake 1230 ml   Output 200 ml   Net 1030 ml       Invasive Devices Peripheral Intravenous Line            Peripheral IV 08/08/21 Right Forearm <1 day                Physical Exam  Vitals and nursing note reviewed  Constitutional:       Appearance: Normal appearance  HENT:      Head: Normocephalic and atraumatic  Cardiovascular:      Rate and Rhythm: Rhythm irregular  Heart sounds: No gallop  Comments: Normal sounding Bio AVR  Pulmonary:      Effort: Pulmonary effort is normal       Breath sounds: Normal breath sounds  No wheezing or rales  Abdominal:      Palpations: Abdomen is soft  Musculoskeletal:         General: No swelling  Skin:     General: Skin is warm and dry  Capillary Refill: Capillary refill takes less than 2 seconds  Neurological:      General: No focal deficit present  Mental Status: She is alert and oriented to person, place, and time  Psychiatric:         Mood and Affect: Mood normal          Thought Content: Thought content normal          Lab Results:   I have personally reviewed pertinent lab results      CBC with diff:   Results from last 7 days   Lab Units 08/09/21  0526   WBC Thousand/uL 4 57   RBC Million/uL 3 05*   HEMOGLOBIN g/dL 9 6*   HEMATOCRIT % 28 7*   MCV fL 94   MCH pg 31 5   MCHC g/dL 33 4   RDW % 20 9*   PLATELETS Thousands/uL 88*     CMP:   Results from last 7 days   Lab Units 08/09/21  0526   SODIUM mmol/L 139   POTASSIUM mmol/L 3 8   CHLORIDE mmol/L 105   CO2 mmol/L 27   BUN mg/dL 9   CREATININE mg/dL 0 82   CALCIUM mg/dL 7 6*   AST U/L 52*   ALT U/L 7*   ALK PHOS U/L 92   EGFR ml/min/1 73sq m 65     Troponin:   0   Lab Value Date/Time    TROPONINI <0 02 08/05/2021 1822     BNP:   Results from last 7 days   Lab Units 08/09/21  0526   POTASSIUM mmol/L 3 8   CHLORIDE mmol/L 105   CO2 mmol/L 27   BUN mg/dL 9   CREATININE mg/dL 0 82   CALCIUM mg/dL 7 6*   EGFR ml/min/1 73sq m 65     Coags:   Results from last 7 days   Lab Units 08/05/21  1822   PTT seconds 28   INR  1 01     TSH:   Results from last 7 days Lab Units 08/08/21  1638   TSH 3RD GENERATON uIU/mL 1 699     Magnesium:   Results from last 7 days   Lab Units 08/05/21  1822   MAGNESIUM mg/dL 2 2     Imaging: I have personally reviewed pertinent reports  Echo 6/16/21:·prior to TAVR   Limited TAVR Screening to assess AS gradient  · Normal ejection fraction, EF 60-65%  · No regional wall motion abnormalities of the left ventricle  · Moderate-to-severe aortic valve stenosis  Mean gradient with Definity   enhancement between 35 and 38 millimeters mercury, with associated mild to   moderate aortic insufficiency   · Slight increase in the systolic gradient across the aortic valve   The   severity of aortic insufficiency is unchanged    EKG: sinus bradycardia

## 2021-08-09 NOTE — CONSULTS
Consultation - [address] [place] [address] [facility]  Donald Brielle 80 y o  female MRN: [id number]  Unit/Bed#: -01 Encounter: [id number]        Consults    Reason for Consult / Principal Problem:     Nausea and vomiting  Acute anemia    ASSESSMENT AND PLAN:      Nausea and vomiting  -suspect this is likely secondary to medication side effect particularly her antibiotics versus a viral gastritis, other differentials could include PUD, H pylori, Braden's erosions given large hiatal hernia seen on CT scan  -hesitant to undergo any endoscopic evaluation especially in the setting of her recent TAVR and new onset of rapid AFib this morning  -continue with antiemetics, check H pylori stool antigen(although this could be false negative given patient is on PPI), continue PPI  -continue to monitor for improvement in symptoms    Acute anemia  -rectal exam with brown stool in vault  -required 2 units PRBCs  -no overt signs of GI bleeding  -she does have left lower quadrant/left flank bruising which could represent hematoma(?  New versus resolving), consider CT non con to assess for RP bleed  -never had a colonoscopy  -could consider bidirectional endoscopy however patient is hesitant to undergo this  -continue to monitor hemoglobin and transfuse for hemoglobin less than 8(given cardiac history); monitor signs for overt GI bleeding    ______________________________________________________________________    HPI:  80-year-old female seen in consultation for nausea, vomiting, decreased appetite and acute anemia  She is currently admitted with generalized weakness thought to be secondary to a UTI which she has been on different antibiotics since July 21  She had been experiencing nausea and vomiting with increased weakness over the past 2 weeks and so came to the ED for further evaluation  Of note she underwent recent TAVR in June 6442 complicated by hematoma for which she had to undergo evacuation    She is on dual anti-platelet agents  She complains of nausea, clear vomiting without abdominal pain, diarrhea or blood in stool  She does endorse chronic constipation which has worsened over the years due to Parkinson's  She takes stool softeners and MiraLax and suppositories as needed  She has never undergone colonoscopy  REVIEW OF SYSTEMS:    CONSTITUTIONAL: Denies any fever, chills, rigors, +weight loss  HEENT: No earache or tinnitus  Denies hearing loss or visual disturbances  CARDIOVASCULAR: No chest pain or palpitations  RESPIRATORY: Denies any cough, hemoptysis, shortness of breath or dyspnea on exertion  GASTROINTESTINAL: As noted in the History of Present Illness  GENITOURINARY: No problems with urination  Denies any hematuria or dysuria  NEUROLOGIC: No dizziness or vertigo, denies headaches  MUSCULOSKELETAL: Denies any muscle or joint pain  SKIN: Denies skin rashes or itching  ENDOCRINE: Denies excessive thirst  Denies intolerance to heat or cold  PSYCHOSOCIAL: Denies depression or anxiety  Denies any recent memory loss         Historical Information   Past Medical History:   Diagnosis Date    [facility] thrombosis[facility] ([facility]     [facility]      Past Surgical History:   Procedure Laterality Date    BREAST SURGERY      HYSTERECTOMY      TUBAL LIGATION       Social History   Social History     Substance and Sexual Activity   Alcohol Use Not Currently     Social History     Substance and Sexual Activity   Drug Use Not Currently     Social History     Tobacco Use   Smoking Status Never Smoker   Smokeless Tobacco Never Used     Family History   Problem Relation Age of Onset    Cancer Mother     Alzheimer's disease Mother     Heart disease Father        Meds/Allergies     Medications Prior to Admission   Medication    aspirin (ECOTRIN LOW STRENGTH) 81 mg EC tablet    atorvastatin (LIPITOR) 80 mg tablet    carbidopa-levodopa (SINEMET CR)  mg per tablet    cholecalciferol (VITAMIN D3) 1,000 units tablet    clopidogrel (PLAVIX) 75 mg tablet    pantoprazole (PROTONIX) 40 mg tablet    polyethylene glycol (MIRALAX) 17 g packet    Safinamide Mesylate (Xadago) 100 MG TABS    Safinamide Mesylate (Xadago) 100 MG TABS    vitamin B-12 (VITAMIN B-12) 1,000 mcg tablet     Current Facility-Administered Medications   Medication Dose Route Frequency    acetaminophen (TYLENOL) tablet 650 mg  650 mg Oral Q6H PRN    aspirin (ECOTRIN LOW STRENGTH) EC tablet 81 mg  81 mg Oral Daily    atorvastatin (LIPITOR) tablet 80 mg  80 mg Oral Daily With Dinner    carbidopa-levodopa (SINEMET CR)  mg per ER tablet 1 tablet  1 tablet Oral 4x Daily    carbidopa-levodopa (SINEMET CR)  mg per ER tablet 1 tablet  1 tablet Oral HS    clopidogrel (PLAVIX) tablet 75 mg  75 mg Oral Daily    heparin (porcine) subcutaneous injection 5,000 Units  5,000 Units Subcutaneous Q8H [address]    metoprolol tartrate (LOPRESSOR) tablet 25 mg  25 mg Oral Q12H SWATI    ondansetron (ZOFRAN) injection 4 mg  4 mg Intravenous Q4H PRN    pantoprazole (PROTONIX) EC tablet 40 mg  40 mg Oral Early Morning    polyethylene glycol (MIRALAX) packet 17 g  17 g Oral BID    promethazine (PHENERGAN) injection 12 5 mg  12 5 mg Intravenous Q6H PRN    Safinamide Mesylate TABS 100 mg  100 mg Oral Daily    senna-docusate sodium (SENOKOT S) 8 6-50 mg per tablet 1 tablet  1 tablet Oral HS    sodium chloride 0 9 % infusion  50 mL/hr Intravenous Continuous       Allergies   Allergen Reactions    Amoxicillin Diarrhea    Ciprofloxacin Vomiting    Erythromycin Diarrhea           Objective     Blood pressure 124/66, pulse 61, temperature 98 °F (36 7 °C), resp  rate 17, height 5' 5" (1 651 m), weight 47 8 kg (105 lb 6 4 oz), SpO2 99 %  Body mass index is 17 54 kg/m²        Intake/Output Summary (Last 24 hours) at 8/9/2021 1710  Last data filed at 8/9/2021 1520  Gross per 24 hour   Intake 1180 ml   Output 1100 ml   Net 80 ml PHYSICAL EXAM:      General Appearance:   Alert, cooperative, no distress   HEENT:   Normocephalic, atraumatic, anicteric      Neck:  Supple, symmetrical, trachea midline   Lungs:   respirations unlabored    Heart[de-identified]   Regular rate     Abdomen:   Soft, non-tender    Genitalia:   Deferred    Rectal:   Internal hemorrhoids palpated, external hemorrhoids, brown stool in vault    Extremities:  No edema       Skin:  +ecchymosis in LLQ and L flank   Lymph nodes:  No palpable cervical lymphadenopathy        Lab Results:   Admission on 08/05/2021   Component Date Value    WBC 08/05/2021 5 80     RBC 08/05/2021 2 94*    Hemoglobin 08/05/2021 9 1*    Hematocrit 08/05/2021 28 4*    MCV 08/05/2021 97     MCH 08/05/2021 31 0     MCHC 08/05/2021 32 0     RDW 08/05/2021 21 2*    Platelets 85/31/5165 129*    nRBC 08/05/2021 0     Neutrophils Relative 08/05/2021 79*    Immat GRANS % 08/05/2021 1     Lymphocytes Relative 08/05/2021 11*    Monocytes Relative 08/05/2021 7     Eosinophils Relative 08/05/2021 1     Basophils Relative 08/05/2021 1     Neutrophils Absolute 08/05/2021 4 59     Immature Grans Absolute 08/05/2021 0 04     Lymphocytes Absolute 08/05/2021 0 66     Monocytes Absolute 08/05/2021 0 43     Eosinophils Absolute 08/05/2021 0 05     Basophils Absolute 08/05/2021 0 03     Sodium 08/05/2021 134*    Potassium 08/05/2021 4 3     Chloride 08/05/2021 100     CO2 08/05/2021 27     ANION GAP 08/05/2021 7     BUN 08/05/2021 15     Creatinine 08/05/2021 1 05     Glucose 08/05/2021 87     Calcium 08/05/2021 8 6     AST 08/05/2021 50*    ALT 08/05/2021 9*    Alkaline Phosphatase 08/05/2021 119*    Total Protein 08/05/2021 6 6     Albumin 08/05/2021 3 9     Total Bilirubin 08/05/2021 1 95*    eGFR 08/05/2021 48     LACTIC ACID 08/05/2021 1 0     Procalcitonin 08/05/2021 <0 05     Protime 08/05/2021 13 1     INR 08/05/2021 1 01     PTT 08/05/2021 28     Blood Culture 08/05/2021 No Growth at 72 hrs   Blood Culture 08/05/2021 No Growth at 72 hrs       Color, UA 08/05/2021 Lisy     Clarity, UA 08/05/2021 Cloudy     Specific Gravity, UA 08/05/2021 1 015     pH, UA 08/05/2021 6 0     Leukocytes, UA 08/05/2021 Trace*    Nitrite, UA 08/05/2021 Negative     Protein, UA 08/05/2021 Trace*    Glucose, UA 08/05/2021 Negative     Ketones, UA 08/05/2021 Trace*    Urobilinogen, UA 08/05/2021 0 2     Bilirubin, UA 08/05/2021 Small*    Blood, UA 08/05/2021 Trace-lysed*    Troponin I 08/05/2021 <0 02     Magnesium 08/05/2021 2 2     RBC, UA 08/05/2021 0-5     WBC, UA 08/05/2021 10-20*    Epithelial Cells 08/05/2021 None Seen     Bacteria, UA 08/05/2021 Moderate*    Hyaline Casts, UA 08/05/2021 1-2*    Urine Culture 08/05/2021 <10,000 cfu/ml      Procalcitonin 08/06/2021 <0 05     Sodium 08/06/2021 140     Potassium 08/06/2021 4 0     Chloride 08/06/2021 106     CO2 08/06/2021 25     ANION GAP 08/06/2021 9     BUN 08/06/2021 13     Creatinine 08/06/2021 0 84     Glucose 08/06/2021 69     Calcium 08/06/2021 7 6*    eGFR 08/06/2021 63     WBC 08/06/2021 3 73*    RBC 08/06/2021 2 37*    Hemoglobin 08/06/2021 7 5*    Hematocrit 08/06/2021 23 1*    MCV 08/06/2021 98     MCH 08/06/2021 31 6     MCHC 08/06/2021 32 5     RDW 08/06/2021 21 7*    Platelets 15/75/3472 90*    WBC 08/07/2021 3 14*    RBC 08/07/2021 2 19*    Hemoglobin 08/07/2021 6 8*    Hematocrit 08/07/2021 21 3*    MCV 08/07/2021 97     MCH 08/07/2021 31 1     MCHC 08/07/2021 31 9     RDW 08/07/2021 21 4*    Platelets 75/75/2272 88*    Sodium 08/07/2021 139     Potassium 08/07/2021 3 7     Chloride 08/07/2021 105     CO2 08/07/2021 25     ANION GAP 08/07/2021 9     BUN 08/07/2021 10     Creatinine 08/07/2021 0 79     Glucose 08/07/2021 70     Calcium 08/07/2021 7 4*    Corrected Calcium 08/07/2021 8 4     AST 08/07/2021 39     ALT 08/07/2021 8*    Alkaline Phosphatase 08/07/2021 89     Total Protein 08/07/2021 4 7*    Albumin 08/07/2021 2 7*    Total Bilirubin 08/07/2021 1 15*    eGFR 08/07/2021 68     Hemoglobin 08/07/2021 7 4*    Hematocrit 08/07/2021 22 7*    ABO Grouping 08/07/2021 O     Rh Factor 08/07/2021 Positive     Antibody Screen 08/07/2021 Negative     Specimen Expiration Date 08/07/2021 [id number]     Iron Saturation 08/07/2021 34     TIBC 08/07/2021 185*    Iron 08/07/2021 63     Ferritin 08/07/2021 269     ABO Grouping 08/07/2021 O     Rh Factor 08/07/2021 Positive     WBC 08/08/2021 3 57*    RBC 08/08/2021 2 39*    Hemoglobin 08/08/2021 7 3*    Hematocrit 08/08/2021 23 3*    MCV 08/08/2021 98     MCH 08/08/2021 30 5     MCHC 08/08/2021 31 3*    RDW 08/08/2021 21 3*    Platelets 46/13/3948 95*    Sodium 08/08/2021 138     Potassium 08/08/2021 3 7     Chloride 08/08/2021 105     CO2 08/08/2021 23     ANION GAP 08/08/2021 10     BUN 08/08/2021 11     Creatinine 08/08/2021 0 79     Glucose 08/08/2021 47*    Calcium 08/08/2021 7 6*    Corrected Calcium 08/08/2021 8 6     AST 08/08/2021 43     ALT 08/08/2021 7*    Alkaline Phosphatase 08/08/2021 92     Total Protein 08/08/2021 4 9*    Albumin 08/08/2021 2 7*    Total Bilirubin 08/08/2021 1 24*    eGFR 08/08/2021 68     POC Glucose 08/08/2021 77     Unit Product Code 08/09/2021 [id number]     Unit Number 08/09/2021 [id number]     Unit ABO 08/09/2021 O     Unit RH 08/09/2021 POS     Crossmatch 08/09/2021 Compatible     Unit Dispense Status 08/09/2021 Presumed Trans     Unit Product Volume 08/09/2021 350     Unit Product Code 08/09/2021 [id number]     Unit Number 08/09/2021 [id number]     Unit ABO 08/09/2021 O     Unit RH 08/09/2021 POS     Crossmatch 08/09/2021 Compatible     Unit Dispense Status 08/09/2021 Presumed Trans     Unit Product Volume 08/09/2021 350     TSH 3RD GENERATON 08/08/2021 1 699     Sodium 08/09/2021 139     Potassium 08/09/2021 3 8     Chloride 08/09/2021 105     CO2 08/09/2021 27     ANION GAP 08/09/2021 7     BUN 08/09/2021 9     Creatinine 08/09/2021 0 82     Glucose 08/09/2021 73     Calcium 08/09/2021 7 6*    Corrected Calcium 08/09/2021 8 6     AST 08/09/2021 52*    ALT 08/09/2021 7*    Alkaline Phosphatase 08/09/2021 92     Total Protein 08/09/2021 4 9*    Albumin 08/09/2021 2 7*    Total Bilirubin 08/09/2021 1 89*    eGFR 08/09/2021 65     WBC 08/09/2021 4 57     RBC 08/09/2021 3 05*    Hemoglobin 08/09/2021 9 6*    Hematocrit 08/09/2021 28 7*    MCV 08/09/2021 94     MCH 08/09/2021 31 5     MCHC 08/09/2021 33 4     RDW 08/09/2021 20 9*    Platelets 31/67/1682 88*    Ventricular Rate 08/09/2021 146     QRSD Interval 08/09/2021 88     QT Interval 08/09/2021 322     QTC Interval 08/09/2021 501     QRS Axis 08/09/2021 -55     T Wave Axis 08/09/2021 151        Imaging Studies: I have personally reviewed pertinent imaging studies

## 2021-08-10 LAB
ANION GAP SERPL CALCULATED.3IONS-SCNC: 7 MMOL/L (ref 4–13)
BUN SERPL-MCNC: 9 MG/DL (ref 5–25)
CALCIUM SERPL-MCNC: 7.3 MG/DL (ref 8.3–10.1)
CHLORIDE SERPL-SCNC: 108 MMOL/L (ref 100–108)
CO2 SERPL-SCNC: 25 MMOL/L (ref 21–32)
CREAT SERPL-MCNC: 0.85 MG/DL (ref 0.6–1.3)
ERYTHROCYTE [DISTWIDTH] IN BLOOD BY AUTOMATED COUNT: 20.9 % (ref 11.6–15.1)
GFR SERPL CREATININE-BSD FRML MDRD: 62 ML/MIN/1.73SQ M
GLUCOSE SERPL-MCNC: 75 MG/DL (ref 65–140)
HCT VFR BLD AUTO: 30.1 % (ref 34.8–46.1)
HGB BLD-MCNC: 9.6 G/DL (ref 11.5–15.4)
MCH RBC QN AUTO: 30.6 PG (ref 26.8–34.3)
MCHC RBC AUTO-ENTMCNC: 31.9 G/DL (ref 31.4–37.4)
MCV RBC AUTO: 96 FL (ref 82–98)
PLATELET # BLD AUTO: 91 THOUSANDS/UL (ref 149–390)
POTASSIUM SERPL-SCNC: 3.6 MMOL/L (ref 3.5–5.3)
RBC # BLD AUTO: 3.14 MILLION/UL (ref 3.81–5.12)
SODIUM SERPL-SCNC: 140 MMOL/L (ref 136–145)
WBC # BLD AUTO: 4.82 THOUSAND/UL (ref 4.31–10.16)

## 2021-08-10 PROCEDURE — 80048 BASIC METABOLIC PNL TOTAL CA: CPT | Performed by: INTERNAL MEDICINE

## 2021-08-10 PROCEDURE — 99232 SBSQ HOSP IP/OBS MODERATE 35: CPT | Performed by: FAMILY MEDICINE

## 2021-08-10 PROCEDURE — 85027 COMPLETE CBC AUTOMATED: CPT | Performed by: INTERNAL MEDICINE

## 2021-08-10 PROCEDURE — 97116 GAIT TRAINING THERAPY: CPT

## 2021-08-10 PROCEDURE — 97110 THERAPEUTIC EXERCISES: CPT

## 2021-08-10 RX ADMIN — POLYETHYLENE GLYCOL 3350 17 G: 17 POWDER, FOR SOLUTION ORAL at 08:18

## 2021-08-10 RX ADMIN — CARBIDOPA AND LEVODOPA 1 TABLET: 50; 200 TABLET, EXTENDED RELEASE ORAL at 12:46

## 2021-08-10 RX ADMIN — CARBIDOPA AND LEVODOPA 1 TABLET: 50; 200 TABLET, EXTENDED RELEASE ORAL at 01:15

## 2021-08-10 RX ADMIN — DOCUSATE SODIUM AND SENNOSIDES 1 TABLET: 8.6; 5 TABLET ORAL at 21:03

## 2021-08-10 RX ADMIN — ASPIRIN 81 MG: 81 TABLET, COATED ORAL at 08:17

## 2021-08-10 RX ADMIN — ATORVASTATIN CALCIUM 80 MG: 40 TABLET, FILM COATED ORAL at 17:39

## 2021-08-10 RX ADMIN — SAFINAMIDE MESYLATE 100 MG: 100 TABLET, FILM COATED ORAL at 08:17

## 2021-08-10 RX ADMIN — CARBIDOPA AND LEVODOPA 1 TABLET: 50; 200 TABLET, EXTENDED RELEASE ORAL at 17:38

## 2021-08-10 RX ADMIN — PANTOPRAZOLE SODIUM 40 MG: 40 TABLET, DELAYED RELEASE ORAL at 05:20

## 2021-08-10 RX ADMIN — CARBIDOPA AND LEVODOPA 1 TABLET: 50; 200 TABLET, EXTENDED RELEASE ORAL at 06:33

## 2021-08-10 RX ADMIN — METOPROLOL TARTRATE 25 MG: 25 TABLET, FILM COATED ORAL at 21:04

## 2021-08-10 RX ADMIN — POLYETHYLENE GLYCOL 3350 17 G: 17 POWDER, FOR SOLUTION ORAL at 21:03

## 2021-08-10 RX ADMIN — HEPARIN SODIUM 5000 UNITS: 5000 INJECTION INTRAVENOUS; SUBCUTANEOUS at 05:21

## 2021-08-10 RX ADMIN — CARBIDOPA AND LEVODOPA 1 TABLET: 50; 200 TABLET, EXTENDED RELEASE ORAL at 21:03

## 2021-08-10 RX ADMIN — CLOPIDOGREL BISULFATE 75 MG: 75 TABLET ORAL at 08:17

## 2021-08-10 RX ADMIN — METOPROLOL TARTRATE 25 MG: 25 TABLET, FILM COATED ORAL at 08:17

## 2021-08-10 RX ADMIN — ACETAMINOPHEN 650 MG: 325 TABLET ORAL at 03:50

## 2021-08-10 NOTE — CASE MANAGEMENT
Case Management Progress Note    Patient name Zoraida Mcallister  Location [address] 330/-[id number] MRN [id number]  : 1934 Date 8/10/2021       LOS (days): 5  Geometric Mean LOS (GMLOS) (days): 3 80  Days to GMLOS:-0 9        PROGRESS NOTE:    Discussed in AM huddle today  Possible dc today  Recommendations is [facility]  CM called and spoke to Kim Queen at [facility] has accepted  They can see patient on Thursday-  Face to Face was initiated and AVS updated  CM spoke to patient and she remains in agreement of home care services  She is aware that [facility] will be out on Thursday-  CM also called MATTHEW Webb, as this is whom I spoke to about dc plans last week  - DC Update was provided with potential dc today  CM was unaware of discharge today  CM spoke to patient at the bedside, reviewed DC IMM with patient and informed that patient can stay an additional 4 hours for reconsidering appealing the discharge as the medicare rights were review on the day of discharge  Pt verbalized understanding and feels ready to go home and does not intend to stay 4 hours to reconsider  DC plan is [facility]      2 PM received a call from [id number], she would like to make sure MD is updating her POA,patients son,her spouse as primary contact is out of town  CM provided Bill's phone number [phone]  Nahomy Villasenor was updated by MD and MD is holding the dc today, and anticipate dc tomorrow  CM called Nahomy Villasenor and reviewed [facility] was set up with patient for [facility] on Thursday  Notified [facility] that dc will be tomorrow

## 2021-08-10 NOTE — ASSESSMENT & PLAN NOTE
· History of 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 at [facility]    Did have endarterectomy for femoral occlusion  · Continue aspirin and clopidogrel but monitor hemoglobin closely

## 2021-08-10 NOTE — QUICK NOTE
Tele check:  Pt remains in NSR  No events on telemetry  Recommend anticoagulation as discussed  To be decided by medicine team once cause for anemia determined  [facility]  Will sign off

## 2021-08-10 NOTE — PLAN OF CARE
Problem: Nutrition/Hydration-ADULT  Goal: Nutrient/Hydration intake appropriate for improving, restoring or maintaining nutritional needs  Description: Monitor and assess patient's nutrition/hydration status for malnutrition  Collaborate with interdisciplinary team and initiate plan and interventions as ordered  Monitor patient's weight and dietary intake as ordered or per policy  Utilize nutrition screening tool and intervene as necessary  Determine patient's food preferences and provide high-protein, high-caloric foods as appropriate       INTERVENTIONS:  - Monitor oral intake, urinary output, labs, and treatment plans  - Assess nutrition and hydration status and recommend course of action  - Evaluate amount of meals eaten  - Allow adequate time for meals  - Recommend/ encourage appropriate diets, oral nutritional supplements, and vitamin/mineral supplements  - Order, calculate, and assess calorie counts as needed  - Assess need for intravenous fluids  - Provide specific nutrition/hydration education as appropriate  - Include patient/family/caregiver in decisions related to nutrition  Outcome: Not Progressing

## 2021-08-10 NOTE — PROGRESS NOTES
[facility]  Progress Note - Keenan Sicard 80 y o  female MRN: [id number]    Unit/Bed#: -01 Encounter: [id number]    Assessment/Plan:  Nausea and vomiting  -suspect this is likely secondary to medication side effect particularly her antibiotics versus a viral gastritis, other differentials could include PUD, H pylori  -hesitant to undergo any endoscopic evaluation especially in the setting of her recent TAVR and new onset of rapid AFib yesterday  -continue with antiemetics, continue PPI  -continue to monitor for improvement in symptoms     Acute anemia  -rectal exam with brown stool in vault  -required 2 units PRBCs  -no overt signs of GI bleeding, Hgb stable  -she does have left lower quadrant/left flank bruising which could represent hematoma(?  New versus resolving), consider CT non con to assess for RP bleed  -never had a colonoscopy  -could consider bidirectional endoscopy however patient is hesitant to undergo this  -continue to monitor hemoglobin and transfuse for hemoglobin less than 8(given cardiac history); monitor signs for overt GI bleeding    Subjective:   Patient with improved nausea however still has metallic taste in her mouth    Objective:     Vitals: Blood pressure 125/95, pulse 65, temperature 98 8 °F (37 1 °C), temperature source Oral, resp  rate 18, height 5' 5" (1 651 m), weight 49 1 kg (108 lb 3 2 oz), SpO2 99 %  ,Body mass index is 18 01 kg/m²  Intake/Output Summary (Last 24 hours) at 8/10/2021 1508  Last data filed at 8/10/2021 1240  Gross per 24 hour   Intake 2267 5 ml   Output 2000 ml   Net 267 5 ml       Review of Systems: as per HPI  Review of Systems   HENT: Negative for trouble swallowing  Gastrointestinal: Positive for nausea  Negative for abdominal pain and vomiting  Physical Exam:     Physical Exam  Constitutional:       General: She is not in acute distress  HENT:      Head: Normocephalic  Eyes:      General: No scleral icterus    Pulmonary: Effort: Pulmonary effort is normal    Abdominal:      Palpations: Abdomen is soft  Tenderness: There is no abdominal tenderness  Musculoskeletal:      Right lower leg: No edema  Left lower leg: No edema  Skin:     Coloration: Skin is not jaundiced  Neurological:      Mental Status: She is oriented to person, place, and time     Psychiatric:         Mood and Affect: Mood normal            Invasive Devices     Peripheral Intravenous Line            Peripheral IV 08/08/21 Right Forearm 2 days                        CBC:   Lab Results   Component Value Date    WBC 4 82 08/10/2021    HGB 9 6 (L) 08/10/2021    HCT 30 1 (L) 08/10/2021    MCV 96 08/10/2021    PLT 91 (L) 08/10/2021    MCH 30 6 08/10/2021    MCHC 31 9 08/10/2021    RDW 20 9 (H) 08/10/2021   ,   CMP:   Lab Results   Component Value Date    K 3 6 08/10/2021     08/10/2021    CO2 25 08/10/2021    BUN 9 08/10/2021    CREATININE 0 85 08/10/2021    CALCIUM 7 3 (L) 08/10/2021    EGFR 62 08/10/2021

## 2021-08-10 NOTE — ASSESSMENT & PLAN NOTE
Malnutrition Findings:   Adult Malnutrition type: Acute illness (related to condition as evidenced by < 50% energy intake > 5 days, severe muscle loss to pectoralis, deltoid and interroseous muscles  Severe fat loss to orbitals )  Adult Degree of Malnutrition: Other severe protein calorie malnutrition (Treated with: Dental soft, 2 gm Na diet  +Ensure compact BID  Consider ST evaluation  Recommend daily weights )  BMI Findings:  Adult BMI Classifications: Underweight < 18 5  Body mass index is 18 01 kg/m²  Calorie count    Encourage her to eat

## 2021-08-10 NOTE — ASSESSMENT & PLAN NOTE
· Acute UTI with generalized malaise  Could not tolerate ciprofloxacin as prescribed on outpatient with nausea/vomiting  · Urine culture with mixed contaminants as infection was partially treated prior to admission  Currently day five cefepime  Will discontinue  · Nausea vomiting has resolved today  May have been secondary to antibiotics  CT abdomen pelvis shows large hiatal hernia with no other acute abnormalities    · PT/OT recommending home services

## 2021-08-10 NOTE — NURSING NOTE
Notified by PCA that pt is confused and saying "something is wrong"  Upon assessment pt crying stating "something is wrong with me"  When asked to explain, pt states that she can't explain what is happening  Pt is disoriented to place, thinking she is at home, but knows that she is not at home  A neuro assessment was done  No deficits other than confusion to place  MAY Nava, aware of same  Will continue to monitor

## 2021-08-10 NOTE — PHYSICAL THERAPY NOTE
PHYSICAL THERAPY TREATMENT NOTE  NAME:  Oli Barrett  DATE: 08/10/21    Length Of Stay: 5  Performed at least 2 patient identifiers during session: Name and Birthday    TREATMENT:    08/10/21 0931   PT Last Visit   PT Visit Date 08/10/21   Note Type   Note Type Treatment   Pain Assessment   Pain Assessment Tool Pain Assessment not indicated - pt denies pain   Pain Score No Pain   Restrictions/Precautions   Other Precautions Chair Alarm; Bed Alarm;Multiple lines; Fall Risk   General   Chart Reviewed Yes   Response to Previous Treatment Patient with no complaints from previous session  Family/Caregiver Present No   Cognition   Orientation Level Oriented X4   Following Commands Follows one step commands without difficulty   Subjective   Subjective "I went to the bathroom by myself this morning "   Bed Mobility   Supine to Sit 6  Modified independent   Additional items Increased time required   Sit to Supine 6  Modified independent   Additional items Increased time required   Additional Comments Pt supine in bed with HOB flat and requring no hand rails for assistance  Transfers   Sit to Stand 5  Supervision  (sba)   Additional items Increased time required;Verbal cues   Stand to Sit 5  Supervision  (sba)   Additional items Increased time required;Verbal cues   Stand pivot   (steadying)   Additional items Increased time required;Verbal cues   Additional Comments Pt initially using no AD for transfers with education on safety concerns and vc for hand placement when pt agreed to use RW  Pt reports feeling more balanced with RW but does not like the one in the hospital because it is difficutlt to spt with it  Ambulation/Elevation   Gait pattern Narrow JUAN A; Forward Flexion; Excessively slow; Short stride   Gait Assistance   (steadying)   Additional items Assist x 1;Verbal cues   Assistive Device Rolling walker   Distance 70'x1, 120'x1 with RW and requiring a seated rest break before trialing steps due to increased fatigue  VC to step into JUAN A of RW and to maintain an upright posture  Pt initially ambulating without AD but using objects for balance  Agreeable to RW with education  Stair Management Assistance   (steadying)   Additional items Assist x 1;Verbal cues   Stair Management Technique Alternating pattern; Step to pattern  (One rail R for going up, One rail on L for going down)   Number of Stairs 10   Balance   Static Sitting Good   Dynamic Sitting Fair +   Static Standing Fair   Dynamic Standing Fair -   Ambulatory Poor +   Activity Tolerance   Activity Tolerance Patient limited by fatigue   Medical Staff Made Aware OT, Jaspal Cordial   Exercises   [employer] Supine;10 reps;AROM; Bilateral   Heelslides Supine;10 reps;AROM; Bilateral   Glute Sets Sitting;10 reps;AAROM; Bilateral   Hip Flexion Sitting;10 reps;AROM; Bilateral   Knee AROM Long Arc Quad Sitting;10 reps;AROM; Bilateral   Ankle Pumps Sitting;10 reps;AROM; Bilateral   Assessment   Prognosis Good   Problem List Decreased strength;Decreased endurance; Impaired balance;Decreased mobility; Decreased safety awareness   Barriers to Discharge Decreased caregiver support   Barriers to Discharge Comments Lives alone, sons live close by   Goals   Patient Goals "Go home"   PT Treatment Day 2   Plan   Treatment/Interventions Functional transfer training;LE strengthening/ROM; Therapeutic exercise;Elevations; Endurance training;Patient/family training;Equipment eval/education; Bed mobility;Gait training   Progress Progressing toward goals   PT Frequency   (3-5x/wk)   Recommendation   PT Discharge Recommendation Home with home health rehabilitation   Equipment Recommended Walker  (Pt has)   PT - OK to Discharge Yes   Additional Comments When medically cleared   Additional Comments 2 Pt supine with HOB flat and all needs within reach     AM-PAC Basic Mobility Inpatient   Turning in Bed Without Bedrails 4   Lying on Back to Sitting on Edge of Flat Bed 4   Moving Bed to Chair 3   Standing Up From Chair 3   Walk in Room 3   Climb 3-5 Stairs 3   Basic Mobility Inpatient Raw Score 20   Basic Mobility Standardized Score 43 99     The patient's AM-PAC Basic Mobility Inpatient Short Form Raw Score is 20, Standardized Score is 43 99  A standardized score greater than 42 9 suggests the patient may benefit from discharge to home  Please also refer to the recommendation of the Physical Therapist for safe discharge planning  Pt seen for PT treatment session this date with interventions consisting of gait training w/ emphasis on improving pt's ability to ambulate level surfaces x 70'x1, 110'x1 with SBA provided by therapist with RW, Therapeutic exercise consisting of: AROM 10 reps B LE in sitting and supine position, therapeutic activity consisting of training: bed mobility, supine<>sit transfers and sit<>stand transfers and navigating 10 stairs w/ R up and L down handrail with step to pattern with SBA  Pt agreeable to PT treatment session upon arrival, pt found supine in bed, in no apparent distress  In comparison to previous session, pt with improvements in ambulation distance with trial of 10 stairs  Pt requiring vc for sequencing with stairs and requires break periods due to fatigue  Vc for hand placement with transfers and for technique with cortney Carlisle Post session: pt returned BTB and all needs in reach Continue to recommend Home PT at time of d/c in order to maximize pt's functional independence and safety w/ mobility  Pt continues to be functioning below baseline level, and remains limited 2* factors listed  PT will continue to see pt while here in order to address the deficits listed above and provide interventions consistent w/ POC in effort to achieve STGs      Yuridia Coats, PTA

## 2021-08-10 NOTE — PLAN OF CARE
Problem: PHYSICAL THERAPY ADULT  Goal: Performs mobility at highest level of function for planned discharge setting  See evaluation for individualized goals  Description: Treatment/Interventions: Functional transfer training, LE strengthening/ROM, Elevations, Therapeutic exercise, Endurance training, Patient/family training, Equipment eval/education, Bed mobility, Gait training, Compensatory technique education, Spoke to nursing, Spoke to case management, OT  Equipment Recommended:  (walker-pt has)       See flowsheet documentation for full assessment, interventions and recommendations  Outcome: Progressing  Note: Prognosis: Good  Problem List: Decreased strength, Decreased endurance, Impaired balance, Decreased mobility, Decreased safety awareness  Assessment: Pt seen for PT treatment session this date with interventions consisting of gait training w/ emphasis on improving pt's ability to ambulate level surfaces x 70'x1, 110'x1 with SBA provided by therapist with RW, Therapeutic exercise consisting of: AROM 10 reps B LE in sitting and supine position, therapeutic activity consisting of training: bed mobility, supine<>sit transfers and sit<>stand transfers and navigating 10 stairs w/ R up and L down handrail with step to pattern with SBA  Pt agreeable to PT treatment session upon arrival, pt found supine in bed, in no apparent distress  In comparison to previous session, pt with improvements in ambulation distance with trial of 10 stairs  Pt requiring vc for sequencing with stairs and requires break periods due to fatigue  Vc for hand placement with transfers and for technique with cortney Baig Rm Post session: pt returned BTB and all needs in reach Continue to recommend Home PT at time of d/c in order to maximize pt's functional independence and safety w/ mobility  Pt continues to be functioning below baseline level, and remains limited 2* factors listed   PT will continue to see pt while here in order to address the deficits listed above and provide interventions consistent w/ POC in effort to achieve STGs  Barriers to Discharge: Decreased caregiver support  Barriers to Discharge Comments: Lives alone, sons live close by     PT Discharge Recommendation: Home with home health rehabilitation     PT - OK to Discharge: Yes    See flowsheet documentation for full assessment

## 2021-08-10 NOTE — ASSESSMENT & PLAN NOTE
· Chronic anemia; did have drop in hemoglobin  · No evidence of bleeding in abdomen/pelvis    Hemoccult neg  · Transfused 2 unit PRBC yesterday with appropriate rise hemoglobin to 9 6  · Needs outpatient follow-up with PCP with repeat labs in 2 weeks    Results from last 7 days   Lab Units 08/10/21  0527 08/09/21  0526 08/08/21  0515 08/07/21  0750 08/07/21  0459 08/06/21  0549 08/05/21  1822   HEMOGLOBIN g/dL 9 6* 9 6* 7 3* 7 4* 6 8* 7 5* 9 1*

## 2021-08-10 NOTE — PROGRESS NOTES
[address]  Progress Note Alexus Polk 1934, 80 y o  female MRN: [id number]  Unit/Bed#: -Kirsten Encounter: [id number]  Primary Care Provider: Carlton Frazier   Date and time admitted to hospital: 8/5/2021  5:54 PM    * Acute cystitis without hematuria  Assessment & Plan  · Acute UTI with generalized malaise  Could not tolerate ciprofloxacin as prescribed on outpatient with nausea/vomiting  · Urine culture with mixed contaminants as infection was partially treated prior to admission  Currently day five cefepime  Will discontinue  · Nausea vomiting has resolved today  May have been secondary to antibiotics  CT abdomen pelvis shows large hiatal hernia with no other acute abnormalities  · PT/OT recommending home services    New onset atrial fibrillation [facility])  Assessment & Plan  · New onset atrial fibrillation overnight  Required IV metoprolol x2  · Started on metoprolol 25 mg with conversion  Cardiology consulted  · Elevated chadsvasc -2 score, will need to decide on anticoagulation prior to discharge    Constipation  Assessment & Plan  · Initially thought responsible for the patient's nausea; continue docusate/senna with miralax  · Given ongoing nausea will have GI evaluate  Will DC further antibiotics as this may be contributing to nausea as well    Severe protein-calorie malnutrition (HCC)  Assessment & Plan  Malnutrition Findings:   Adult Malnutrition type: Acute illness (related to condition as evidenced by < 50% energy intake > 5 days, severe muscle loss to pectoralis, deltoid and interroseous muscles  Severe fat loss to orbitals )  Adult Degree of Malnutrition: Other severe protein calorie malnutrition (Treated with: Dental soft, 2 gm Na diet  +Ensure compact BID  Consider ST evaluation  Recommend daily weights )  BMI Findings:  Adult BMI Classifications: Underweight < 18 5  Body mass index is 18 01 kg/m²  Calorie count    Encourage her to eat    Thrombocytopenia ([facility] [address] )  Assessment & Plan  · Very mild thrombocytopenia likely secondary to acute infection  No evidence of bleeding    Results from last 7 days   Lab Units 08/10/21  0527 08/09/21  0526 08/08/21  0515 08/07/21  0459 08/06/21  0549 08/05/21  1822   PLATELETS Thousands/uL 91* 88* 95* 88* 90* 129*       Hiatal hernia  Assessment & Plan  · Continue pantoprazole    [facility]'s [facility] ([facility] )  Assessment & Plan  · Continue sinemet and xadago    History of transcatheter aortic valve replacement (TAVR)  Assessment & Plan  · History of 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 at [facility]  Did have endarterectomy for femoral occlusion  · Continue aspirin and clopidogrel but monitor hemoglobin closely    Chronic anemia  Assessment & Plan  · Chronic anemia; did have drop in hemoglobin  · No evidence of bleeding in abdomen/pelvis  Hemoccult neg  · Transfused 2 unit PRBC yesterday with appropriate rise hemoglobin to 9 6  · Needs outpatient follow-up with PCP with repeat labs in 2 weeks    Results from last 7 days   Lab Units 08/10/21  0527 08/09/21  0526 08/08/21  0515 08/07/21  0750 08/07/21  0459 08/06/21  0549 08/05/21  1822   HEMOGLOBIN g/dL 9 6* 9 6* 7 3* 7 4* 6 8* 7 5* 9 1*         VTE Pharmacologic Prophylaxis:   Pharmacologic: Pharmacologic VTE Prophylaxis contraindicated due to Low platelet  Mechanical VTE Prophylaxis in Place: Yes    Patient Centered Rounds: I have performed bedside rounds with nursing staff today  Discussions with Specialists or Other Care Team Provider:  Discussed with Cardiology    Education and Discussions with Family / Patient:  Discussed with patient and family    Time Spent for Care: 30 minutes  More than 50% of total time spent on counseling and coordination of care as described above      Current Length of Stay: 5 day(s)    Current Patient Status: Inpatient   Certification Statement: The patient will continue to require additional inpatient hospital stay due to Intractable nausea    Discharge Plan:  Plan for discharge home tomorrow with family support    Code Status: Level 3 - DNAR and DNI      Subjective:   Patient is barely eating anything  States she still has nausea but not vomiting today  Appetite has been poor for almost the last 2 weeks  Denies any palpitations today and states that she feels little bit stronger    Objective:     Vitals:   Temp (24hrs), Av 1 °F (36 7 °C), Min:97 8 °F (36 6 °C), Max:98 8 °F (37 1 °C)    Temp:  [97 8 °F (36 6 °C)-98 8 °F (37 1 °C)] 98 8 °F (37 1 °C)  HR:  [55-65] 65  Resp:  [17-20] 18  BP: (119-135)/(62-96) 125/95  SpO2:  [95 %-100 %] 99 %  Body mass index is 18 01 kg/m²  Input and Output Summary (last 24 hours): Intake/Output Summary (Last 24 hours) at 8/10/2021 1415  Last data filed at 8/10/2021 1240  Gross per 24 hour   Intake 2267 5 ml   Output 2000 ml   Net 267 5 ml       Physical Exam:     Physical Exam  Vitals and nursing note reviewed  Constitutional:       Appearance: Normal appearance  Comments: Frail elderly female with severe generalized muscle wasting and kyphosis noted   HENT:      Head: Normocephalic and atraumatic  Right Ear: External ear normal       Left Ear: External ear normal       Nose: Nose normal       Mouth/Throat:      Pharynx: Oropharynx is clear  Eyes:      Pupils: Pupils are equal, round, and reactive to light  Cardiovascular:      Rate and Rhythm: Normal rate and regular rhythm  Heart sounds: Normal heart sounds  Pulmonary:      Effort: Pulmonary effort is normal       Breath sounds: Normal breath sounds  Abdominal:      General: Bowel sounds are normal       Palpations: Abdomen is soft  Tenderness: There is no abdominal tenderness  Musculoskeletal:         General: Normal range of motion  Cervical back: Normal range of motion and neck supple  Skin:     General: Skin is warm and dry  Capillary Refill: Capillary refill takes less than 2 seconds  Neurological:      General: No focal deficit present  Mental Status: She is alert and oriented to person, place, and time  Psychiatric:         Mood and Affect: Mood normal            Additional Data:     Labs:    Results from last 7 days   Lab Units 08/10/21  0527 08/05/21  1822   WBC Thousand/uL 4 82 5 80   HEMOGLOBIN g/dL 9 6* 9 1*   HEMATOCRIT % 30 1* 28 4*   PLATELETS Thousands/uL 91* 129*   NEUTROS PCT %  --  79*   LYMPHS PCT %  --  11*   MONOS PCT %  --  7   EOS PCT %  --  1     Results from last 7 days   Lab Units 08/10/21  0527 08/09/21  0526   SODIUM mmol/L 140 139   POTASSIUM mmol/L 3 6 3 8   CHLORIDE mmol/L 108 105   CO2 mmol/L 25 27   BUN mg/dL 9 9   CREATININE mg/dL 0 85 0 82   ANION GAP mmol/L 7 7   CALCIUM mg/dL 7 3* 7 6*   ALBUMIN g/dL  --  2 7*   TOTAL BILIRUBIN mg/dL  --  1 89*   ALK PHOS U/L  --  92   ALT U/L  --  7*   AST U/L  --  52*   GLUCOSE RANDOM mg/dL 75 73     Results from last 7 days   Lab Units 08/05/21  1822   INR  1 01     Results from last 7 days   Lab Units 08/08/21  0618   POC GLUCOSE mg/dl 77         Results from last 7 days   Lab Units 08/06/21  0549 08/05/21  1822   LACTIC ACID mmol/L  --  1 0   PROCALCITONIN ng/ml <0 05 <0 05           * I Have Reviewed All Lab Data Listed Above  * Additional Pertinent Lab Tests Reviewed: Sha [address] Admission Reviewed    Imaging:    Imaging Reports Reviewed Today Include:  CT abdomen pelvis  Imaging Personally Reviewed by Myself Includes:  Chest x-ray    Recent Cultures (last 7 days):     Results from last 7 days   Lab Units 08/05/21  1844 08/05/21  1822   BLOOD CULTURE   --  No Growth After 4 Days  No Growth After 4 Days     URINE CULTURE  <10,000 cfu/ml   --        Last 24 Hours Medication List:   Current Facility-Administered Medications   Medication Dose Route Frequency Provider Last Rate    acetaminophen  650 mg Oral Q6H PRN JADA Ashton      aspirin  81 mg Oral Daily JADA Ashton      atorvastatin  80 mg Oral Daily With Ryan Dawn, CRNP      carbidopa-levodopa  1 tablet Oral 4x Daily Kerry S Roberto, CRNP      carbidopa-levodopa  1 tablet Oral HS Kerry S Roberto, CRNP      clopidogrel  75 mg Oral Daily Reginald Elysiater, CRNP      metoprolol tartrate  25 mg Oral Q12H [address] Tejinder Hinton, DO      ondansetron  4 mg Intravenous Q4H PRN Cozetta Genaross, DO      pantoprazole  40 mg Oral Early Morning Reginald Banda, JANINENP      polyethylene glycol  17 g Oral BID Cozetta Crass, DO      promethazine  12 5 mg Intravenous Q6H PRN Tejinder Hinton, DO      Safinamide Mesylate  100 mg Oral Daily Cozetta Crass, DO      senna-docusate sodium  1 tablet Oral HS Kerry S Roberto, CRNP          Today, Patient Was Seen By: Chun Jamison MD    ** Please Note: Dictation voice to text software may have been used in the creation of this document   **

## 2021-08-10 NOTE — PROGRESS NOTES
Per MD, possibility of pt being d/c today  Pt continues with poor intake  Takes a couple bites of meal and reports either full or food tasting metallic  Pt reports "I know I would eat better at home " Says she is "just used to my cooking "     Advised pt if she were to stay longer to have family bring in dinner meal    If stay extended, please order I/Os and calorie count  Will maintain ensure compact QID as ordered  Provided pt with ensure coupons for home  D/c 2 gm Na restriction, pt craving ham    Maintain dental soft diet which pt is agreeable to

## 2021-08-10 NOTE — ASSESSMENT & PLAN NOTE
· Very mild thrombocytopenia likely secondary to acute infection    No evidence of bleeding    Results from last 7 days   Lab Units 08/10/21  0527 08/09/21  0526 08/08/21  0515 08/07/21  0459 08/06/21  0549 08/05/21  1822   PLATELETS Thousands/uL 91* 88* 95* 88* 90* 129*

## 2021-08-11 VITALS
DIASTOLIC BLOOD PRESSURE: 58 MMHG | HEIGHT: 65 IN | SYSTOLIC BLOOD PRESSURE: 110 MMHG | OXYGEN SATURATION: 96 % | RESPIRATION RATE: 14 BRPM | HEART RATE: 60 BPM | WEIGHT: 106.8 LBS | TEMPERATURE: 98.3 F | BODY MASS INDEX: 17.79 KG/M2

## 2021-08-11 LAB
BACTERIA BLD CULT: NORMAL
BACTERIA BLD CULT: NORMAL

## 2021-08-11 PROCEDURE — 99239 HOSP IP/OBS DSCHRG MGMT >30: CPT | Performed by: FAMILY MEDICINE

## 2021-08-11 RX ORDER — AMOXICILLIN 250 MG
1 CAPSULE ORAL
Qty: 30 TABLET | Refills: 0 | Status: SHIPPED | OUTPATIENT
Start: 2021-08-11 | End: 2021-09-14 | Stop reason: HOSPADM

## 2021-08-11 RX ADMIN — ONDANSETRON 4 MG: 2 INJECTION INTRAMUSCULAR; INTRAVENOUS at 03:20

## 2021-08-11 RX ADMIN — ASPIRIN 81 MG: 81 TABLET, COATED ORAL at 08:10

## 2021-08-11 RX ADMIN — CLOPIDOGREL BISULFATE 75 MG: 75 TABLET ORAL at 08:10

## 2021-08-11 RX ADMIN — CARBIDOPA AND LEVODOPA 1 TABLET: 50; 200 TABLET, EXTENDED RELEASE ORAL at 06:06

## 2021-08-11 RX ADMIN — METOPROLOL TARTRATE 25 MG: 25 TABLET, FILM COATED ORAL at 08:10

## 2021-08-11 RX ADMIN — PANTOPRAZOLE SODIUM 40 MG: 40 TABLET, DELAYED RELEASE ORAL at 06:06

## 2021-08-11 RX ADMIN — SAFINAMIDE MESYLATE 100 MG: 100 TABLET, FILM COATED ORAL at 08:10

## 2021-08-11 RX ADMIN — CARBIDOPA AND LEVODOPA 1 TABLET: 50; 200 TABLET, EXTENDED RELEASE ORAL at 01:09

## 2021-08-11 RX ADMIN — POLYETHYLENE GLYCOL 3350 17 G: 17 POWDER, FOR SOLUTION ORAL at 08:10

## 2021-08-11 NOTE — ASSESSMENT & PLAN NOTE
· Initially thought responsible for the patient's nausea; continue docusate/senna with miralax     · Given ongoing nausea will have GI evaluate outpatient if it persists

## 2021-08-11 NOTE — ASSESSMENT & PLAN NOTE
· History of 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 at [facility]    Did have endarterectomy for femoral occlusion  · Continue aspirin and clopidogrel but monitor hemoglobin closely

## 2021-08-11 NOTE — ASSESSMENT & PLAN NOTE
Malnutrition Findings:   Adult Malnutrition type: Acute illness (related to condition as evidenced by < 50% energy intake > 5 days, severe muscle loss to pectoralis, deltoid and interroseous muscles  Severe fat loss to orbitals )  Adult Degree of Malnutrition: Other severe protein calorie malnutrition (Treated with: Dental soft, 2 gm Na diet  +Ensure compact BID  Consider ST evaluation  Recommend daily weights )  BMI Findings:  Adult BMI Classifications: Underweight < 18 5  Body mass index is 17 77 kg/m²  Calorie count    Encourage her to eat

## 2021-08-11 NOTE — CASE MANAGEMENT
Case Management Progress Note    Patient name Donald   Location [address] 330/-01 MRN [id number]  : 1934 Date 2021       LOS (days): 6  Geometric Mean LOS (GMLOS) (days): 3 80  Days to GMLOS:-1 9        Faxed the AVS to [address] [place] [address]  Notified [employer] of dc

## 2021-08-11 NOTE — ASSESSMENT & PLAN NOTE
· Chronic anemia; did have drop in hemoglobin  · No evidence of bleeding in abdomen/pelvis    Hemoccult neg  · Transfused 2 unit PRBC this admit with appropriate rise hemoglobin to 9 6  · Needs outpatient follow-up with PCP with repeat labs in 2 weeks  · Will need to follow up outpatient Cardiology and GI to determine long-term need for anticoagulation if hemoglobin remains stable    Results from last 7 days   Lab Units 08/10/21  0527 08/09/21  0526 08/08/21  0515 08/07/21  0750 08/07/21  0459 08/06/21  0549 08/05/21  1822   HEMOGLOBIN g/dL 9 6* 9 6* 7 3* 7 4* 6 8* 7 5* 9 1*

## 2021-08-11 NOTE — NURSING NOTE
Discharge instructions reviewed with patient and son  IV d/c'd  All questions answered  Pt home medications returned to pt  No written scripts

## 2021-08-11 NOTE — DISCHARGE SUMMARY
[address]  Discharge- Luisa  1934, 80 y o  female MRN: [id number]  Unit/Bed#: -Kirsten Encounter: [id number]  Primary Care Provider: Tamanna Brothers   Date and time admitted to hospital: 8/5/2021  5:54 PM    * Acute cystitis without hematuria  Assessment & Plan  · Acute UTI with generalized malaise  Could not tolerate ciprofloxacin as prescribed on outpatient with nausea/vomiting  · Urine culture with mixed contaminants as infection was partially treated prior to admission  Currently day five cefepime  Will discontinue  · Nausea vomiting has resolved today  May have been secondary to antibiotics  CT abdomen pelvis shows large hiatal hernia with no other acute abnormalities  · PT/OT recommending home services    New onset atrial fibrillation [facility])  Assessment & Plan  · New onset atrial fibrillation overnight  Required IV metoprolol x2  · Started on metoprolol 25 mg with conversion  Cardiology consulted  · Elevated chadsvasc -2 score,   · Currently patient is in sinus rhythm  No anticoagulation at this time however will need outpatient follow-up with GI and Cardiology determine need for it long-term    Acute on chronic anemia  Assessment & Plan  · Chronic anemia; did have drop in hemoglobin  · No evidence of bleeding in abdomen/pelvis  Hemoccult neg  · Transfused 2 unit PRBC this admit with appropriate rise hemoglobin to 9 6  · Needs outpatient follow-up with PCP with repeat labs in 2 weeks  · Will need to follow up outpatient Cardiology and GI to determine long-term need for anticoagulation if hemoglobin remains stable    Results from last 7 days   Lab Units 08/10/21  0527 08/09/21  0526 08/08/21  0515 08/07/21  0750 08/07/21  0459 08/06/21  0549 08/05/21  1822   HEMOGLOBIN g/dL 9 6* 9 6* 7 3* 7 4* 6 8* 7 5* 9 1*       Constipation  Assessment & Plan  · Initially thought responsible for the patient's nausea; continue docusate/senna with miralax     · Given ongoing nausea will have GI evaluate outpatient if it persists    Severe protein-calorie malnutrition (HCC)  Assessment & Plan  Malnutrition Findings:   Adult Malnutrition type: Acute illness (related to condition as evidenced by < 50% energy intake > 5 days, severe muscle loss to pectoralis, deltoid and interroseous muscles  Severe fat loss to orbitals )  Adult Degree of Malnutrition: Other severe protein calorie malnutrition (Treated with: Dental soft, 2 gm Na diet  +Ensure compact BID  Consider ST evaluation  Recommend daily weights )  BMI Findings:  Adult BMI Classifications: Underweight < 18 5  Body mass index is 17 77 kg/m²  Calorie count  Encourage her to eat    Thrombocytopenia ([facility] [address] )  Assessment & Plan  · Very mild thrombocytopenia likely secondary to acute infection  No evidence of bleeding    Results from last 7 days   Lab Units 08/10/21  0527 08/09/21  0526 08/08/21  0515 08/07/21  0459 08/06/21  0549 08/05/21  1822   PLATELETS Thousands/uL 91* 88* 95* 88* 90* 129*       Hiatal hernia  Assessment & Plan  · Continue pantoprazole    Parkinson's disease ([facility] [address] )  Assessment & Plan  · Continue sinemet and xadago    History of transcatheter aortic valve replacement (TAVR)  Assessment & Plan  · History of 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 at [facility]  Did have endarterectomy for femoral occlusion  · Continue aspirin and clopidogrel but monitor hemoglobin closely    Discharging Physician / Practitioner: Chun Jamison MD  PCP: Liliya Hill  Admission Date:   Admission Orders (From admission, onward)     Ordered        08/05/21 2019  Inpatient Admission  Once                   Discharge Date: 08/11/21    Medical Problems     Resolved Problems  Date Reviewed: 8/11/2021    None                Consultations During Hospital Stay:  · Gi and cardiology    Procedures Performed:   · none    Significant Findings / Test Results:   XR chest portable    Result Date: 8/6/2021  Impression: No acute cardiopulmonary disease   Workstation performed: [id number]     CT abdomen pelvis with contrast    Result Date: 8/5/2021  Impression: No acute inflammatory stranding No bowel obstruction There is small amount of free fluid in the pelvis  ,  Likely corresponds to abnormality described at CT from July 2, 2021 performed at [facility] No abnormal bowel wall thickening, or pneumatosis or free air Large hiatal hernia No new intra-abdominal hematoma I personally discussed this study with Dennis HUFF on 8/5/2021 at 8:10 PM  Workstation performed: [id number]     Incidental Findings:   · None     Test Results Pending at Discharge (will require follow up): · None     Outpatient Tests Requested:  · CBC and BMP in 2 weeks  · Outpatient follow-up with GI and Cardiology in 2-4 weeks    Complications:  None    Reason for Admission:  Generalized weakness    Hospital Course:     Heena Dent is a 80 y o  female patient who originally presented to the hospital on 8/5/2021 due to generalized weakness and found to have acute on chronic anemia and acute cystitis  Treated with antibiotics for the acute cystitis and completed course  Received 2 units PRBC following which hemoglobin improved to 9 6  Patient still has poor appetite with nausea  Recommended to have EGD performed in consultation with GI however she deferred for now  Would like to try how she does at home 1st and then with follow-up with GI  Please note she also had episode of new onset AFib with RVR which was treated with dose of beta-blocker and placed on oral beta-blockers following which it has now resolved and back to sinus rhythm  Will defer on any anticoagulation at this time due to acute on chronic anemia will need outpatient follow-up with GI and Cardiology to determine need for long-term anticoagulation  Please see above list of diagnoses and related plan for additional information       Condition at Discharge: fair     Discharge Day Visit / Exam:     Subjective:  Patient states that she does not feel hungry  Has a poor appetite  Has some nausea last night but no vomiting reported  Meal completions have been around 25-50%  Vitals: Blood Pressure: 110/58 (08/11/21 0602)  Pulse: 60 (08/11/21 0602)  Temperature: 98 3 °F (36 8 °C) (08/11/21 0602)  Temp Source: Oral (08/10/21 0741)  Respirations: 14 (08/11/21 0602)  Height: 5' 5" (165 1 cm) (08/06/21 1029)  Weight - Scale: 48 4 kg (106 lb 12 8 oz) (08/11/21 0600)  SpO2: 96 % (08/11/21 0602)  Exam:   Physical Exam  Vitals and nursing note reviewed  Constitutional:       Comments: Severe generalized muscle wasting noted   HENT:      Head: Normocephalic and atraumatic  Right Ear: External ear normal       Left Ear: External ear normal       Nose: Nose normal       Mouth/Throat:      Pharynx: Oropharynx is clear  Eyes:      Pupils: Pupils are equal, round, and reactive to light  Cardiovascular:      Rate and Rhythm: Normal rate and regular rhythm  Heart sounds: Normal heart sounds  Pulmonary:      Effort: Pulmonary effort is normal       Breath sounds: Normal breath sounds  Abdominal:      General: Bowel sounds are normal       Palpations: Abdomen is soft  Tenderness: There is no abdominal tenderness  Musculoskeletal:         General: Normal range of motion  Cervical back: Normal range of motion and neck supple  Skin:     General: Skin is warm and dry  Capillary Refill: Capillary refill takes less than 2 seconds  Neurological:      General: No focal deficit present  Mental Status: She is alert and oriented to person, place, and time  Psychiatric:         Mood and Affect: Mood normal          Discussion with Family:  Discussed with son    Discharge instructions/Information to patient and family:   See after visit summary for information provided to patient and family  Provisions for Follow-Up Care:  See after visit summary for information related to follow-up care and any pertinent home health orders  Disposition:     Home with VNA Services (Reminder: Complete face to face encounter)    For Discharges to [facility] SNF:   · Not Applicable to this Patient - Not Applicable to this Patient    Planned Readmission:  None     Discharge Statement:  I spent 35 minutes discharging the patient  This time was spent on the day of discharge  I had direct contact with the patient on the day of discharge  Greater than 50% of the total time was spent examining patient, answering all patient questions, arranging and discussing plan of care with patient as well as directly providing post-discharge instructions  Additional time then spent on discharge activities  Discharge Medications:  See after visit summary for reconciled discharge medications provided to patient and family        ** Please Note: This note has been constructed using a voice recognition system **

## 2021-08-11 NOTE — PLAN OF CARE
Problem: Potential for Falls  Goal: Patient will remain free of falls  Description: INTERVENTIONS:  - Educate patient/family on patient safety including physical limitations  - Instruct patient to call for assistance with activity , roller walker  - Consult OT/PT to assist with strengthening/mobility   - Keep Call bell within reach  - Keep bed low and locked with side rails adjusted as appropriate  - Keep care items and personal belongings within reach  - Initiate and maintain comfort rounds  - Make Fall Risk Sign visible to staff  - Offer Toileting every 4 Hours, in advance of need  - Initiate/Maintain bed alarm  - Obtain necessary fall risk management equipment: bracelet and socks  - Apply yellow socks and bracelet for high fall risk patients  - Consider moving patient to room near nurses station  8/11/2021 1131 by Aliyah Stubbs RN  Outcome: Completed  8/11/2021 0726 by Aliyah Stubbs RN  Outcome: Progressing     Problem: Prexisting or High Potential for Compromised Skin Integrity  Goal: Skin integrity is maintained or improved  Description: INTERVENTIONS:  - Identify patients at risk for skin breakdown  - Assess and monitor skin integrity  - Assess and monitor nutrition and hydration status  - Monitor labs   - Assess for incontinence   - Assist with mobility/ambulation  - Relieve pressure over bony prominences  - Avoid friction and shearing  - Provide appropriate hygiene as needed including keeping skin clean and dry  - Evaluate need for skin moisturizer/barrier cream  - Collaborate with interdisciplinary team   - Patient/family teaching     8/11/2021 1131 by Aliyah Stubbs RN  Outcome: Completed  8/11/2021 0726 by Aliyah Stubbs RN  Outcome: Progressing     Problem: Nutrition/Hydration-ADULT  Goal: Nutrient/Hydration intake appropriate for improving, restoring or maintaining nutritional needs  Description: Monitor and assess patient's nutrition/hydration status for malnutrition   Collaborate with interdisciplinary team and initiate plan and interventions as ordered  Monitor patient's weight and dietary intake as ordered or per policy  Utilize nutrition screening tool and intervene as necessary  Determine patient's food preferences and provide high-protein, high-caloric foods as appropriate       INTERVENTIONS:  - Monitor oral intake, urinary output, labs, and treatment plans  - Assess nutrition and hydration status and recommend course of action  - Evaluate amount of meals eaten  - Allow adequate time for meals  - Recommend/ encourage appropriate diets, oral nutritional supplements, and vitamin/mineral supplements  - Order, calculate, and assess calorie counts as needed  - Assess need for intravenous fluids  - Provide specific nutrition/hydration education as appropriate  - Include patient/family/caregiver in decisions related to nutrition  8/11/2021 1131 by Dary Henderson RN  Outcome: Completed  8/11/2021 0726 by Dary Henderson RN  Outcome: Progressing     Problem: PAIN - ADULT  Goal: Verbalizes/displays adequate comfort level or baseline comfort level  Description: Interventions:  - Encourage patient to monitor pain and request assistance  - Assess pain using appropriate pain scale0-10  - Administer analgesics based on type and severity of pain and evaluate response  - Implement non-pharmacological measures as appropriate and evaluate response  - Consider cultural and social influences on pain and pain management  - Notify physician/advanced practitioner if interventions unsuccessful or patient reports new pain  8/11/2021 1131 by Dary Henderson RN  Outcome: Completed  8/11/2021 0726 by Dary Henderson RN  Outcome: Progressing     Problem: INFECTION - ADULT  Goal: Absence or prevention of progression during hospitalization  Description: INTERVENTIONS:  - Assess and monitor for signs and symptoms of infection  - Monitor lab/diagnostic results  - Monitor all insertion sites  - Administer medications as ordered  - Instruct and encourage patient and family to use good hand hygiene technique  - Identify and instruct in appropriate isolation precautions for identified infection/condition  8/11/2021 1131 by Nohemi Guadalupe RN  Outcome: Completed  8/11/2021 0726 by Nohemi Guadalupe RN  Outcome: Progressing     Problem: SAFETY ADULT  Goal: Patient will remain free of falls  Description: INTERVENTIONS:  - Educate patient/family on patient safety including physical limitations  - Instruct patient to call for assistance with activity , roller walker  - Consult OT/PT to assist with strengthening/mobility   - Keep Call bell within reach  - Keep bed low and locked with side rails adjusted as appropriate  - Keep care items and personal belongings within reach  - Initiate and maintain comfort rounds  - Make Fall Risk Sign visible to staff  - Offer Toileting every 4 Hours, in advance of need  - Initiate/Maintain bed alarm  - Obtain necessary fall risk management equipment: bracelet and socks  - Apply yellow socks and bracelet for high fall risk patients  - Consider moving patient to room near nurses station  8/11/2021 1131 by Nohemi Guadalupe RN  Outcome: Completed  8/11/2021 0726 by Nohemi Guadalupe RN  Outcome: Progressing  Goal: Maintain or return to baseline ADL function  Description: INTERVENTIONS:  -  Assess patient's ability to carry out ADLs; assess patient's baseline for ADL function and identify physical deficits which impact ability to perform ADLs (bathing, care of mouth/teeth, toileting, grooming, dressing, etc )  - Assess/evaluate cause of self-care deficits   - Assess range of motion  - Assess patient's mobility; develop plan if impaired  - Assess patient's need for assistive devices and provide as appropriate  - Encourage maximum independence but intervene and supervise when necessary  - Involve family in performance of ADLs  - Assess for home care needs following discharge   - Provide patient education as appropriate  8/11/2021 1131 by Sarah Hayden RN  Outcome: Completed  8/11/2021 0726 by Sarah Hayden RN  Outcome: Progressing  Goal: Maintains/Returns to pre admission functional level  Description: INTERVENTIONS:  - Perform BMAT or MOVE assessment daily    - Set and communicate daily mobility goal to care team and patient/family/caregiver  - Collaborate with rehabilitation services on mobility goals if consulted  - Perform Range of Motion 4 times a day  - Reposition patient every 4 hours  - Dangle patient 3 times a day  - Stand patient 3 times a day  - Out of bed for toileting  - Record patient progress and toleration of activity level   8/11/2021 1131 by Sarah Hayden RN  Outcome: Completed  8/11/2021 0726 by Sarah Hayden RN  Outcome: Progressing     Problem: DISCHARGE PLANNING  Goal: Discharge to home or other facility with appropriate resources  Description: INTERVENTIONS:  - Identify barriers to discharge w/patient and caregiver  - Arrange for needed discharge resources and transportation as appropriate  - Identify discharge learning needs (meds, wound care, etc )  - Arrange for interpretive services to assist at discharge as needed  - Refer to Case Management Department for coordinating discharge planning if the patient needs post-hospital services based on physician/advanced practitioner order or complex needs related to functional status, cognitive ability, or social support system  8/11/2021 1131 by Sarah Hayden RN  Outcome: Completed  8/11/2021 0726 by Sarah Hayden RN  Outcome: Progressing     Problem: Knowledge Deficit  Goal: Patient/family/caregiver demonstrates understanding of disease process, treatment plan, medications, and discharge instructions  Description: Complete learning assessment and assess knowledge base    Interventions:  - Provide teaching at level of understanding  - Provide teaching via preferred learning methods  8/11/2021 1131 by Sarah Hayden RN  Outcome: Completed  8/11/2021 0726 by Ni Figueroa RN  Outcome: Progressing     Problem: DISCHARGE PLANNING - CARE MANAGEMENT  Goal: Discharge to post-acute care or home with appropriate resources  Description: INTERVENTIONS:  - Conduct assessment to determine patient/family and health care team treatment goals, and need for post-acute services based on payer coverage, community resources, and patient preferences, and barriers to discharge  - Address psychosocial, clinical, and financial barriers to discharge as identified in assessment in conjunction with the patient/family and health care team  - Arrange appropriate level of post-acute services according to patient's   needs and preference and payer coverage in collaboration with the physician and health care team  - Communicate with and update the patient/family, physician, and health care team regarding progress on the discharge plan  - Arrange appropriate transportation to post-acute venues  8/11/2021 1131 by Ni Figueroa RN  Outcome: Completed  8/11/2021 0726 by Ni Figueroa RN  Outcome: Progressing     Problem: MOBILITY - ADULT  Goal: Maintain or return to baseline ADL function  Description: INTERVENTIONS:  -  Assess patient's ability to carry out ADLs; assess patient's baseline for ADL function and identify physical deficits which impact ability to perform ADLs (bathing, care of mouth/teeth, toileting, grooming, dressing, etc )  - Assess/evaluate cause of self-care deficits   - Assess range of motion  - Assess patient's mobility; develop plan if impaired  - Assess patient's need for assistive devices and provide as appropriate  - Encourage maximum independence but intervene and supervise when necessary  - Involve family in performance of ADLs  - Assess for home care needs following discharge   - Provide patient education as appropriate  8/11/2021 1131 by Ni Figueroa RN  Outcome: Completed  8/11/2021 0726 by Ni Figueroa RN  Outcome: Progressing  Goal: Maintains/Returns to pre admission functional level  Description: INTERVENTIONS:  - Perform BMAT or MOVE assessment daily    - Set and communicate daily mobility goal to care team and patient/family/caregiver  - Collaborate with rehabilitation services on mobility goals if consulted  - Perform Range of Motion 4 times a day  - Reposition patient every 4 hours    - Dangle patient 3 times a day  - Stand patient 3 times a day  - Out of bed for toileting  - Record patient progress and toleration of activity level   8/11/2021 1131 by Abril Dunn RN  Outcome: Completed  8/11/2021 0726 by Abril Dunn RN  Outcome: Progressing     Problem: HEMATOLOGIC - ADULT  Goal: Maintains hematologic stability  Description: INTERVENTIONS  - Assess for signs and symptoms of bleeding or hemorrhage  - Monitor labs  - Administer supportive blood products/factors as ordered and appropriate  8/11/2021 1131 by Abril Dunn RN  Outcome: Completed  8/11/2021 0726 by Abril Dunn RN  Outcome: Progressing

## 2021-08-11 NOTE — ASSESSMENT & PLAN NOTE
· New onset atrial fibrillation overnight  Required IV metoprolol x2  · Started on metoprolol 25 mg with conversion  Cardiology consulted  · Elevated chadsvasc -2 score,   · Currently patient is in sinus rhythm    No anticoagulation at this time however will need outpatient follow-up with GI and Cardiology determine need for it long-term

## 2021-08-12 LAB
HEMOCCULT STL QL: NEGATIVE
HEMOCCULT STL QL: NORMAL
HEMOCCULT STL QL: NORMAL

## 2021-08-17 NOTE — UTILIZATION REVIEW
Notification of Discharge   This is a Notification of Discharge from our facility [address]  Please be advised that this patient has been discharge from our facility  Below you will find the admission and discharge date and time including the patients disposition  UTILIZATION REVIEW CONTACT:  Marisela Jane  Utilization   Network Utilization Review Department  Phone: [phone] x carefully listen to the prompts  All voicemails are confidential   Email: [email or web address]     PHYSICIAN ADVISORY SERVICES:  FOR [id number] REVIEW - MEDICAL NECESSITY DENIAL  Phone: [phone]  Fax: [phone]  Email: [email or web address]     PRESENTATION DATE: 8/5/2021  5:54 PM  OBERVATION ADMISSION DATE:   INPATIENT ADMISSION DATE: 8/5/21  8:19 PM   DISCHARGE DATE: 8/11/2021 12:14 PM  DISPOSITION: Home with [place] with [address] [place] [address] Road INFORMATION:  Send all requests for admission clinical reviews, approved or denied determinations and any other requests to dedicated fax number below belonging to the campus where the patient is receiving treatment   List of dedicated fax numbers:  [address] [place] [address] DENIALS (Administrative/Medical Necessity) [phone]   [address] (Maternity/NICU/Pediatrics) [phone]   José Antonio Duke [phone]   Donato Lozano [phone]   Daniel Gan [phone]   [facility] [address] [facility] [phone]   [facility]  [phone]   [address] [facility] [address]  [address] [facility] [address] [facility] [phone]

## 2021-09-03 ENCOUNTER — APPOINTMENT (EMERGENCY)
Dept: ULTRASOUND IMAGING | Facility: HOSPITAL | Age: 86
DRG: 808 | End: 2021-09-03
Payer: COMMERCIAL

## 2021-09-03 ENCOUNTER — HOSPITAL ENCOUNTER (INPATIENT)
Facility: HOSPITAL | Age: 86
LOS: 6 days | DRG: 808 | End: 2021-09-09
Attending: EMERGENCY MEDICINE | Admitting: INTERNAL MEDICINE
Payer: COMMERCIAL

## 2021-09-03 DIAGNOSIS — R11.2 NAUSEA & VOMITING: ICD-10-CM

## 2021-09-03 DIAGNOSIS — R11.2 INTRACTABLE NAUSEA AND VOMITING: ICD-10-CM

## 2021-09-03 DIAGNOSIS — E43 SEVERE PROTEIN-CALORIE MALNUTRITION (HCC): ICD-10-CM

## 2021-09-03 DIAGNOSIS — K44.9 HIATAL HERNIA: ICD-10-CM

## 2021-09-03 DIAGNOSIS — K29.70 GASTRITIS: Primary | ICD-10-CM

## 2021-09-03 DIAGNOSIS — K81.0 ACUTE CHOLECYSTITIS: ICD-10-CM

## 2021-09-03 PROBLEM — E80.6 HYPERBILIRUBINEMIA: Status: ACTIVE | Noted: 2021-09-03

## 2021-09-03 LAB
ALBUMIN SERPL BCP-MCNC: 4 G/DL (ref 3.5–5)
ALP SERPL-CCNC: 104 U/L (ref 46–116)
ALT SERPL W P-5'-P-CCNC: 8 U/L (ref 12–78)
ANION GAP SERPL CALCULATED.3IONS-SCNC: 8 MMOL/L (ref 4–13)
AST SERPL W P-5'-P-CCNC: 59 U/L (ref 5–45)
ATRIAL RATE: 64 BPM
BASOPHILS # BLD AUTO: 0.04 THOUSANDS/ΜL (ref 0–0.1)
BASOPHILS NFR BLD AUTO: 1 % (ref 0–1)
BILIRUB SERPL-MCNC: 2.47 MG/DL (ref 0.2–1)
BUN SERPL-MCNC: 18 MG/DL (ref 5–25)
CALCIUM SERPL-MCNC: 8.6 MG/DL (ref 8.3–10.1)
CHLORIDE SERPL-SCNC: 99 MMOL/L (ref 100–108)
CO2 SERPL-SCNC: 28 MMOL/L (ref 21–32)
CREAT SERPL-MCNC: 0.98 MG/DL (ref 0.6–1.3)
EOSINOPHIL # BLD AUTO: 0.04 THOUSAND/ΜL (ref 0–0.61)
EOSINOPHIL NFR BLD AUTO: 1 % (ref 0–6)
ERYTHROCYTE [DISTWIDTH] IN BLOOD BY AUTOMATED COUNT: 21.5 % (ref 11.6–15.1)
GFR SERPL CREATININE-BSD FRML MDRD: 52 ML/MIN/1.73SQ M
GLUCOSE SERPL-MCNC: 83 MG/DL (ref 65–140)
HCT VFR BLD AUTO: 31 % (ref 34.8–46.1)
HGB BLD-MCNC: 10 G/DL (ref 11.5–15.4)
IMM GRANULOCYTES # BLD AUTO: 0.04 THOUSAND/UL (ref 0–0.2)
IMM GRANULOCYTES NFR BLD AUTO: 1 % (ref 0–2)
LACTATE SERPL-SCNC: 1.2 MMOL/L (ref 0.5–2)
LIPASE SERPL-CCNC: 154 U/L (ref 73–393)
LYMPHOCYTES # BLD AUTO: 0.56 THOUSANDS/ΜL (ref 0.6–4.47)
LYMPHOCYTES NFR BLD AUTO: 16 % (ref 14–44)
MCH RBC QN AUTO: 31.7 PG (ref 26.8–34.3)
MCHC RBC AUTO-ENTMCNC: 32.3 G/DL (ref 31.4–37.4)
MCV RBC AUTO: 98 FL (ref 82–98)
MONOCYTES # BLD AUTO: 0.32 THOUSAND/ΜL (ref 0.17–1.22)
MONOCYTES NFR BLD AUTO: 9 % (ref 4–12)
NEUTROPHILS # BLD AUTO: 2.52 THOUSANDS/ΜL (ref 1.85–7.62)
NEUTS SEG NFR BLD AUTO: 72 % (ref 43–75)
NRBC BLD AUTO-RTO: 0 /100 WBCS
P AXIS: 51 DEGREES
PLATELET # BLD AUTO: 107 THOUSANDS/UL (ref 149–390)
POTASSIUM SERPL-SCNC: 4 MMOL/L (ref 3.5–5.3)
PR INTERVAL: 130 MS
PROT SERPL-MCNC: 6.7 G/DL (ref 6.4–8.2)
QRS AXIS: 2 DEGREES
QRSD INTERVAL: 90 MS
QT INTERVAL: 434 MS
QTC INTERVAL: 447 MS
RBC # BLD AUTO: 3.15 MILLION/UL (ref 3.81–5.12)
SODIUM SERPL-SCNC: 135 MMOL/L (ref 136–145)
T WAVE AXIS: 76 DEGREES
VENTRICULAR RATE: 64 BPM
WBC # BLD AUTO: 3.52 THOUSAND/UL (ref 4.31–10.16)

## 2021-09-03 PROCEDURE — 96361 HYDRATE IV INFUSION ADD-ON: CPT

## 2021-09-03 PROCEDURE — C9113 INJ PANTOPRAZOLE SODIUM, VIA: HCPCS | Performed by: INTERNAL MEDICINE

## 2021-09-03 PROCEDURE — 36415 COLL VENOUS BLD VENIPUNCTURE: CPT | Performed by: EMERGENCY MEDICINE

## 2021-09-03 PROCEDURE — 96374 THER/PROPH/DIAG INJ IV PUSH: CPT

## 2021-09-03 PROCEDURE — 99223 1ST HOSP IP/OBS HIGH 75: CPT | Performed by: INTERNAL MEDICINE

## 2021-09-03 PROCEDURE — 96375 TX/PRO/DX INJ NEW DRUG ADDON: CPT

## 2021-09-03 PROCEDURE — 83605 ASSAY OF LACTIC ACID: CPT | Performed by: EMERGENCY MEDICINE

## 2021-09-03 PROCEDURE — C9113 INJ PANTOPRAZOLE SODIUM, VIA: HCPCS | Performed by: EMERGENCY MEDICINE

## 2021-09-03 PROCEDURE — 99285 EMERGENCY DEPT VISIT HI MDM: CPT

## 2021-09-03 PROCEDURE — 83690 ASSAY OF LIPASE: CPT | Performed by: EMERGENCY MEDICINE

## 2021-09-03 PROCEDURE — 76705 ECHO EXAM OF ABDOMEN: CPT

## 2021-09-03 PROCEDURE — 93005 ELECTROCARDIOGRAM TRACING: CPT

## 2021-09-03 PROCEDURE — 99285 EMERGENCY DEPT VISIT HI MDM: CPT | Performed by: EMERGENCY MEDICINE

## 2021-09-03 PROCEDURE — 80053 COMPREHEN METABOLIC PANEL: CPT | Performed by: EMERGENCY MEDICINE

## 2021-09-03 PROCEDURE — 85025 COMPLETE CBC W/AUTO DIFF WBC: CPT | Performed by: EMERGENCY MEDICINE

## 2021-09-03 RX ORDER — METOCLOPRAMIDE HYDROCHLORIDE 5 MG/ML
10 INJECTION INTRAMUSCULAR; INTRAVENOUS EVERY 6 HOURS PRN
Status: DISCONTINUED | OUTPATIENT
Start: 2021-09-03 | End: 2021-09-09 | Stop reason: HOSPADM

## 2021-09-03 RX ORDER — METOCLOPRAMIDE HYDROCHLORIDE 5 MG/ML
10 INJECTION INTRAMUSCULAR; INTRAVENOUS ONCE
Status: COMPLETED | OUTPATIENT
Start: 2021-09-03 | End: 2021-09-03

## 2021-09-03 RX ORDER — CLOPIDOGREL BISULFATE 75 MG/1
75 TABLET ORAL DAILY
Status: ON HOLD | COMMUNITY
End: 2021-09-03 | Stop reason: CLARIF

## 2021-09-03 RX ORDER — ACETAMINOPHEN 325 MG/1
650 TABLET ORAL EVERY 6 HOURS PRN
Status: DISCONTINUED | OUTPATIENT
Start: 2021-09-03 | End: 2021-09-09 | Stop reason: HOSPADM

## 2021-09-03 RX ORDER — SODIUM CHLORIDE, SODIUM LACTATE, POTASSIUM CHLORIDE, CALCIUM CHLORIDE 600; 310; 30; 20 MG/100ML; MG/100ML; MG/100ML; MG/100ML
100 INJECTION, SOLUTION INTRAVENOUS CONTINUOUS
Status: DISCONTINUED | OUTPATIENT
Start: 2021-09-03 | End: 2021-09-05

## 2021-09-03 RX ORDER — PANTOPRAZOLE SODIUM 40 MG/1
40 INJECTION, POWDER, FOR SOLUTION INTRAVENOUS EVERY 12 HOURS SCHEDULED
Status: DISCONTINUED | OUTPATIENT
Start: 2021-09-03 | End: 2021-09-07

## 2021-09-03 RX ORDER — PANTOPRAZOLE SODIUM 40 MG/1
40 INJECTION, POWDER, FOR SOLUTION INTRAVENOUS ONCE
Status: COMPLETED | OUTPATIENT
Start: 2021-09-03 | End: 2021-09-03

## 2021-09-03 RX ORDER — ACETAMINOPHEN 325 MG/1
650 TABLET ORAL ONCE
Status: COMPLETED | OUTPATIENT
Start: 2021-09-03 | End: 2021-09-03

## 2021-09-03 RX ORDER — SODIUM CHLORIDE, SODIUM LACTATE, POTASSIUM CHLORIDE, CALCIUM CHLORIDE 600; 310; 30; 20 MG/100ML; MG/100ML; MG/100ML; MG/100ML
175 INJECTION, SOLUTION INTRAVENOUS CONTINUOUS
Status: DISCONTINUED | OUTPATIENT
Start: 2021-09-03 | End: 2021-09-03

## 2021-09-03 RX ORDER — POLYETHYLENE GLYCOL 3350 17 G/17G
17 POWDER, FOR SOLUTION ORAL DAILY
Status: DISCONTINUED | OUTPATIENT
Start: 2021-09-04 | End: 2021-09-09 | Stop reason: HOSPADM

## 2021-09-03 RX ORDER — AMOXICILLIN 250 MG
1 CAPSULE ORAL
Status: DISCONTINUED | OUTPATIENT
Start: 2021-09-03 | End: 2021-09-09 | Stop reason: HOSPADM

## 2021-09-03 RX ORDER — ONDANSETRON 2 MG/ML
4 INJECTION INTRAMUSCULAR; INTRAVENOUS EVERY 4 HOURS PRN
Status: DISCONTINUED | OUTPATIENT
Start: 2021-09-03 | End: 2021-09-06

## 2021-09-03 RX ORDER — ATORVASTATIN CALCIUM 40 MG/1
80 TABLET, FILM COATED ORAL DAILY
Status: DISCONTINUED | OUTPATIENT
Start: 2021-09-04 | End: 2021-09-09 | Stop reason: HOSPADM

## 2021-09-03 RX ORDER — ASPIRIN 81 MG/1
81 TABLET ORAL DAILY
Status: DISCONTINUED | OUTPATIENT
Start: 2021-09-04 | End: 2021-09-08

## 2021-09-03 RX ORDER — CARBIDOPA AND LEVODOPA 50; 200 MG/1; MG/1
1 TABLET, EXTENDED RELEASE ORAL
Status: DISCONTINUED | OUTPATIENT
Start: 2021-09-03 | End: 2021-09-09 | Stop reason: HOSPADM

## 2021-09-03 RX ORDER — ONDANSETRON 2 MG/ML
4 INJECTION INTRAMUSCULAR; INTRAVENOUS ONCE
Status: COMPLETED | OUTPATIENT
Start: 2021-09-03 | End: 2021-09-03

## 2021-09-03 RX ADMIN — METOPROLOL TARTRATE 25 MG: 25 TABLET, FILM COATED ORAL at 23:35

## 2021-09-03 RX ADMIN — SODIUM CHLORIDE, SODIUM LACTATE, POTASSIUM CHLORIDE, AND CALCIUM CHLORIDE 175 ML/HR: .6; .31; .03; .02 INJECTION, SOLUTION INTRAVENOUS at 09:55

## 2021-09-03 RX ADMIN — METOCLOPRAMIDE HYDROCHLORIDE 10 MG: 5 INJECTION INTRAMUSCULAR; INTRAVENOUS at 14:21

## 2021-09-03 RX ADMIN — PANTOPRAZOLE SODIUM 40 MG: 40 INJECTION, POWDER, FOR SOLUTION INTRAVENOUS at 23:30

## 2021-09-03 RX ADMIN — ACETAMINOPHEN 650 MG: 325 TABLET ORAL at 14:21

## 2021-09-03 RX ADMIN — PANTOPRAZOLE SODIUM 40 MG: 40 INJECTION, POWDER, FOR SOLUTION INTRAVENOUS at 09:55

## 2021-09-03 RX ADMIN — ONDANSETRON 4 MG: 2 INJECTION INTRAMUSCULAR; INTRAVENOUS at 23:31

## 2021-09-03 RX ADMIN — SODIUM CHLORIDE, SODIUM LACTATE, POTASSIUM CHLORIDE, AND CALCIUM CHLORIDE 100 ML/HR: .6; .31; .03; .02 INJECTION, SOLUTION INTRAVENOUS at 23:31

## 2021-09-03 RX ADMIN — SODIUM CHLORIDE 1000 ML: 0.9 INJECTION, SOLUTION INTRAVENOUS at 10:00

## 2021-09-03 RX ADMIN — CARBIDOPA AND LEVODOPA 1 TABLET: 50; 200 TABLET, EXTENDED RELEASE ORAL at 23:34

## 2021-09-03 RX ADMIN — DOCUSATE SODIUM AND SENNOSIDES 1 TABLET: 8.6; 5 TABLET ORAL at 23:35

## 2021-09-03 RX ADMIN — ONDANSETRON 4 MG: 2 INJECTION INTRAMUSCULAR; INTRAVENOUS at 09:52

## 2021-09-03 NOTE — ED NOTES
Alvin Kelley [facility]                                               [phone]                         Waiting on transportation to be assigned      Jasmyne Ennis RN  09/03/21 6925

## 2021-09-03 NOTE — ED NOTES
Son gave patient her parkinson medication because we are unable to get it for her   Matty Solders)     Leslie Pressley RN  09/03/21 5641

## 2021-09-03 NOTE — EMTALA/ACUTE CARE TRANSFER
[address] [facility]  [address]  [facility] [address]  Dept: [phone]      EMTALA TRANSFER CONSENT    NAME Jacob Patel                                         1934                              MRN [id number]    I have been informed of my rights regarding examination, treatment, and transfer   by Dr Darrick Garcia DO    Benefits:   Care of intractable nausea vomiting and possible endoscopy    Risks: Potential for delay in receiving treatment, Potential deterioration of medical condition, Loss of IV, Increased discomfort during transfer, Possible worsening of condition or death during transfer      Consent for Transfer:  I acknowledge that my medical condition has been evaluated and explained to me by the emergency department physician or other qualified medical person and/or my attending physician, who has recommended that I be transferred to the service of  Accepting Physician: Dr Kimberly Carmona at [address] [place] [address] Name, [address] : [address] [facility]  The above potential benefits of such transfer, the potential risks associated with such transfer, and the probable risks of not being transferred have been explained to me, and I fully understand them  The doctor has explained that, in my case, the benefits of transfer outweigh the risks  I agree to be transferred  I authorize the performance of emergency medical procedures and treatments upon me in both transit and upon arrival at the receiving facility  Additionally, I authorize the release of any and all medical records to the receiving facility and request they be transported with me, if possible  I understand that the safest mode of transportation during a medical emergency is an ambulance and that the Hospital advocates the use of this mode of transport   Risks of traveling to the receiving facility by car, including absence of medical control, life sustaining equipment, such as oxygen, and medical personnel has been explained to me and I fully understand them  (GARY CORRECT BOX BELOW)  [ X ]  I consent to the stated transfer and to be transported by ambulance/helicopter  [  ]  I consent to the stated transfer, but refuse transportation by ambulance and accept full responsibility for my transportation by car  I understand the risks of non-ambulance transfers and I exonerate the Hospital and its staff from any deterioration in my condition that results from this refusal     X___________________________________________    DATE  21  TIME_140_______  Signature of patient or legally responsible individual signing on patient behalf           RELATIONSHIP TO PATIENT_________________________          Provider Certification    NAME Elisabet Arzola                                        [facility] 1934                              MRN [id number]    A medical screening exam was performed on the above named patient  Based on the examination:    Condition Necessitating Transfer The primary encounter diagnosis was Gastritis  A diagnosis of Nausea & vomiting was also pertinent to this visit      Patient Condition: The patient has been stabilized such that within reasonable medical probability, no material deterioration of the patient condition or the condition of the unborn child(dustin) is likely to result from the transfer    Reason for Transfer: No bed available at level of patient's needs    Transfer Requirements: [address] [facility],[address] [facility] AT [facility]   · Space available and qualified personnel available for treatment as acknowledged by Xavi Soto  · Agreed to accept transfer and to provide appropriate medical treatment as acknowledged by       Dr Riaz Webster  · Appropriate medical records of the examination and treatment of the patient are provided at the time of transfer   [address] _______  · Transfer will be performed by qualified personnel from    and appropriate transfer equipment as required, including the use of necessary and appropriate life support measures  Provider Certification: I have examined the patient and explained the following risks and benefits of being transferred/refusing transfer to the patient/family:  General risk, such as traffic hazards, adverse weather conditions, rough terrain or turbulence, possible failure of equipment (including vehicle or aircraft), or consequences of actions of persons outside the control of the transport personnel, Risk of worsening condition, Unanticipated needs of medical equipment and personnel during transport, The possibility of a transport vehicle being unavailable      Based on these reasonable risks and benefits to the patient and/or the unborn child(dustin), and based upon the information available at the time of the patients examination, I certify that the medical benefits reasonably to be expected from the provision of appropriate medical treatments at another medical facility outweigh the increasing risks, if any, to the individuals medical condition, and in the case of labor to the unborn child, from effecting the transfer      X____________________________________________ DATE 09/03/21        TIME_1405______      ORIGINAL - SEND TO MEDICAL RECORDS   COPY - SEND WITH PATIENT DURING TRANSFER

## 2021-09-03 NOTE — Clinical Note
Patient to be transferred to [facility]  We have spoken with their medical director of the transfer center    This is a bed capacity and leveling transfer

## 2021-09-03 NOTE — H&P
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  · She was then hospitalized here for nausea vomiting 8/5/21 into 8/11/2021  She was anemic requiring blood transfusion and it was thought her symptoms may have been related to antibiotic use for UTI  · Patient reports two weeks of poor appetite and nausea/vomiting again  · Case was discussed by ED with GI for possible endoscopy this admission  · Start pantoprazole b i d  IV fluids and trial of diet    Hyperbilirubinemia  Assessment & Plan  · Nonspecific  Abdominal ultrasound without pathology  Recheck in a m  Results from last 7 days   Lab Units 09/03/21  0859   TOTAL BILIRUBIN mg/dL 2 47*       Paroxysmal atrial fibrillation (HCC)  Assessment & Plan  · AFib diagnosed during last admission  Was started on metoprolol 25 mg b i d   · Patient not on anticoagulation due to recent anemia ? requiring blood transfusion during last hospitalization and patient was discharged in sinus rhythm  · Anticoagulation to be deferred to cardiology as outpatient     Diastolic congestive heart failure [facility])  Assessment & Plan  Wt Readings from Last 3 Encounters:   09/03/21 44 7 kg (98 lb 8 7 oz)   08/11/21 48 4 kg (106 lb 12 8 oz)     · Compensated    Monitor on gentle IV fluids    Constipation  Assessment & Plan  · Patient reports constipation however really has had poor intake  · Will continue docusate/senna with miralax    Parkinson's disease ([facility] [address] )  Assessment & Plan  · Continue sinemet with xadago    History of transcatheter aortic valve replacement (TAVR)  Assessment & Plan  · [facility] [place] July [facility]807 with complications as listed above  · Saw CTS on 8/31/2021 with recommendations to stop clopidogrel especially with patient's complaint that this was causing her GI upset      VTE Prophylaxis: Enoxaparin (Lovenox)  Code Status: Level 3 - DNAR and DNI  Anticipated Length of Stay:  Patient will be admitted on an Inpatient basis with an anticipated length of stay of  greater than 2 midnights  Justification for Hospital Stay: Intractable nausea and vomiting  Total Time for Visit, including Counseling / Coordination of Care: xx mins  Greater than 50% of this total time spent on direct patient counseling and coordination of care  Chief Complaint:     Chief Complaint   Patient presents with    Nausea     pt reports ever since she was admitted to the hospital previously for UTI pt has been having nasuea and vomitting and is unable to eat  pt reports recent change in her medications  History of Present Illness:    Heena Dent is a 80 y o  female with a past medical history of aortic stenosis status post TAVR July 2021, new onset atrial fibrillation last admission here August 2021, hypertension, and Parkinson's disease who presents with recurrent nausea/vomiting  The patient underwent TAVR July 2021 at [facility]  Hospital course was complicated by femoral artery occlusion requiring endarterectomy  She was then being treated as an outpatient for UTI but subsequently was hospitalized here for intractable nausea and vomiting  During hospitalization it was thought her GI symptoms were secondary to her antibiotics  She was noted to be anemic requiring blood transfusion but she was eventually discharged home in good condition  She states that shortly after discharge while at home for metallic taste returned and she never regained good appetite    She blames clopidogrel for the cause of her GI upset and on follow-up with CTS on 8/31/2021 her clopidogrel was discontinued as she was 60 days postop from TAVR  She came here to the hospital where case was discussed with GI for possible endoscopy given her history of hiatal hernia and blood loss anemia  Plan was initially to transfer to [facility] due to limited bed capacity here but after waiting for transport she was able to be admitted here  On examination she denies any chest pain  She is debilitated and continues to have dry heaves  Any vomitus is nonbloody  She reports not having bowel movement for several days  Review of Systems:  Review of Systems   Constitutional: Positive for fatigue  Negative for chills, diaphoresis and fever  HENT: Negative for dental problem and facial swelling  Eyes: Negative for photophobia, pain and visual disturbance  Respiratory: Negative for shortness of breath, wheezing and stridor  Cardiovascular: Negative for chest pain and palpitations  Gastrointestinal: Positive for abdominal pain, nausea and vomiting  Negative for abdominal distention and diarrhea  Genitourinary: Negative for dysuria, hematuria and urgency  Musculoskeletal: Negative for back pain and myalgias  Skin: Negative for rash  Neurological: Positive for weakness  Negative for dizziness, speech difficulty, numbness and headaches  Psychiatric/Behavioral: Negative for agitation  The patient is not nervous/anxious  All other systems reviewed and are negative          Past Medical and Surgical History:   Past Medical History:   Diagnosis Date    [facility]     DVT (deep venous thrombosis) ([facility])     History of transcatheter aortic valve replacement (TAVR)     MI [facility]ocardial infarction) ([facility] )     Parkinson's disease (HCC)     Paroxysmal atrial fibrillation (HCC)     UTI (urinary tract infection)      Past Surgical History:   Procedure Laterality Date    BREAST SURGERY      CARDIAC SURGERY      HYSTERECTOMY      REPLACEMENT AORTIC VALVE TRANSCATHETER (TAVR)      TUBAL LIGATION       Meds/Allergies: Allergies: Allergies   Allergen Reactions    Amoxicillin Diarrhea    Ciprofloxacin Vomiting    Erythromycin Diarrhea    Plavix [Clopidogrel] GI Intolerance     Prior to Admission Medications   Prescriptions Last Dose Informant Patient Reported? Taking? Safinamide Mesylate (Xadago) 100 MG TABS 9/3/2021 at 1000 Self Yes Yes   Sig: Take 100 mg by mouth daily    aspirin (ECOTRIN LOW STRENGTH) 81 mg EC tablet 9/2/2021 at 0900  Yes Yes   Sig: Take 81 mg by mouth daily    atorvastatin (LIPITOR) 80 mg tablet 9/2/2021 at 0900 Self Yes Yes   Sig: Take 80 mg by mouth daily    carbidopa-levodopa (SINEMET CR)  mg per tablet 9/3/2021 at 1900 Self Yes Yes   Sig: Take 1 tablet by mouth 5 (five) times a day    cholecalciferol (VITAMIN D3) 1,000 units tablet 9/2/2021 at 0900 Self Yes Yes   Sig: Take 1,000 Units by mouth daily    metoprolol tartrate (LOPRESSOR) 25 mg tablet 9/2/2021 at 2000 Self No Yes   Sig: Take 1 tablet (25 mg total) by mouth every 12 (twelve) hours   pantoprazole (PROTONIX) 40 mg tablet 9/2/2021 at 0900 Self Yes Yes   Sig: Take 40 mg by mouth daily    polyethylene glycol (MIRALAX) 17 g packet 9/2/2021 at Unknown time Self Yes Yes   Sig: Take 17 g by mouth daily    senna-docusate sodium (SENOKOT S) 8 6-50 mg per tablet 9/2/2021 at 2100 Self No Yes   Sig: Take 1 tablet by mouth daily at bedtime   vitamin B-12 (VITAMIN B-12) 1,000 mcg tablet 9/2/2021 at 0900 Self Yes Yes   Sig: Take 1,000 mcg by mouth daily       Facility-Administered Medications: None     Social History:     Social History     Socioeconomic History    Marital status:       Spouse name: Not on file    Number of children: Not on file    Years of education: Not on file    Highest education level: Not on file   Occupational History    Not on file   Tobacco Use    Smoking status: Never Smoker    Smokeless tobacco: Never Used   Vaping Use  Vaping Use: Never used   Substance and Sexual Activity    Alcohol use: Not Currently    Drug use: Not Currently    Sexual activity: Not Currently   Other Topics Concern    Not on file   Social History Narrative    Not on file     Social Determinants of Health     Financial Resource Strain:     Difficulty of Paying Living Expenses:    Food Insecurity:     Worried About Running Out of Food in the Last Year:     [address] in the Last Year:    Transportation Needs: No Transportation Needs    Lack of Transportation (Medical): No    Lack of Transportation (Non-Medical): No   Physical Activity:     Days of Exercise per Week:     Minutes of Exercise per Session:    Stress:     Feeling of Stress :    Social Connections:     Frequency of Communication with Friends and Family:     Frequency of Social Gatherings with Friends and Family:     Attends [employer] Services:     Active Member of Clubs or Organizations:     Attends Club or Organization Meetings:     Marital Status:    Intimate Partner Violence:     Fear of Current or Ex-Partner:     Emotionally Abused:     Physically Abused:     Sexually Abused:      Patient Pre-hospital Living Situation:  Lives alone  Patient Pre-hospital Level of Mobility:   Patient Pre-hospital Diet Restrictions:     Family History:  Family History   Problem Relation Age of Onset    Cancer Mother     Alzheimer's disease Mother     Heart disease Father      Physical Exam:   Vitals:   Blood Pressure: 152/71 (09/1934)  Pulse: 71 (09/1934)  Temperature: 98 °F (36 7 °C) (09/1934)  Temp Source: Oral (09/1934)  Respirations: 18 (09/1934)  Height: 5' 5" (165 1 cm) (09/03/21 0848)  Weight - Scale: 44 7 kg (98 lb 8 7 oz) (09/03/21 0848)  SpO2: 97 % (09/1934)    Physical Exam  Vitals reviewed  Constitutional:       General: She is not in acute distress  Appearance: Normal appearance  HENT:      Head: Atraumatic        Mouth/Throat: Mouth: Mucous membranes are dry  Eyes:      General: No scleral icterus  Extraocular Movements: Extraocular movements intact  Pupils: Pupils are equal, round, and reactive to light  Cardiovascular:      Rate and Rhythm: Regular rhythm  Heart sounds: Murmur heard  Pulmonary:      Breath sounds: Decreased breath sounds present  No wheezing  Abdominal:      General: Bowel sounds are normal       Palpations: Abdomen is soft  Tenderness: There is no guarding or rebound  Musculoskeletal:         General: No swelling  Cervical back: Normal range of motion  Skin:     General: Skin is warm  Neurological:      Mental Status: She is alert and oriented to person, place, and time  Mental status is at baseline  Psychiatric:         Mood and Affect: Mood normal        Lab Results: I have personally reviewed pertinent reports  Results from last 7 days   Lab Units 09/03/21  0859   WBC Thousand/uL 3 52*   HEMOGLOBIN g/dL 10 0*   HEMATOCRIT % 31 0*   PLATELETS Thousands/uL 107*   NEUTROS PCT % 72   LYMPHS PCT % 16   MONOS PCT % 9   EOS PCT % 1     Results from last 7 days   Lab Units 09/03/21  0859   SODIUM mmol/L 135*   POTASSIUM mmol/L 4 0   CHLORIDE mmol/L 99*   CO2 mmol/L 28   ANION GAP mmol/L 8   BUN mg/dL 18   CREATININE mg/dL 0 98   CALCIUM mg/dL 8 6   ALBUMIN g/dL 4 0   TOTAL BILIRUBIN mg/dL 2 47*   ALK PHOS U/L 104   ALT U/L 8*   AST U/L 59*   EGFR ml/min/1 73sq m 52   GLUCOSE RANDOM mg/dL 83             Results from last 7 days   Lab Units 09/03/21  0859   LACTIC ACID mmol/L 1 2             Imaging: I have personally reviewed pertinent films in PACS  US right upper quadrant  Result Date: 9/3/2021  Impression: Gallbladder sludge  Otherwise, no evidence of biliary ductal dilatation  Hepatic and right renal cysts   Workstation performed: [id number]       EKG, Pathology, and Other Studies Reviewed on Admission:   EKG  Result Date: 09/03/21  Personally reviewed strips with impression of:  Normal sinus rhythm 64 bpm    Allscripts/ Epic Records Reviewed: Yes    ** Please Note: This note has been constructed using a voice recognition system   **

## 2021-09-03 NOTE — ED PROVIDER NOTES
History  Chief Complaint   Patient presents with    Nausea     pt reports ever since she was admitted to the hospital previously for UTI pt has been having nasuea and vomitting and is unable to eat  pt reports recent change in her medications  80-year-old female presents emergency room with recurrent nausea vomiting  Was in our facility early last month for similar occurrence  Was seen by gastroenterology  Nausea vomiting felt to be secondary possibly to medications well as possibly esophagitis  Endoscopy was deferred secondary to the patient's current medical conditions  Patient returns today with several days of inability to eat despite medication adjustments  Reports that she puts food in her mouth and she instantly gags  She is having minimal abdominal discomfort  No diarrhea  No fever  No chest pain  No shortness of breath  Patient appears uncomfortable  Apparently last time patient was here she had hemoglobin which dropped to 6 8 and she was transfused  Patient does look somewhat pale today when I initially evaluate her  Previous CT failed to reveal any acute pathology  History provided by:  Patient and relative  Nausea  The primary symptoms include weight loss, fatigue, abdominal pain, nausea and vomiting  Primary symptoms do not include fever, diarrhea, melena, hematemesis, hematochezia, dysuria, myalgias, arthralgias or rash  The illness began 3 to 5 days ago  The onset was gradual    The fatigue began today  The abdominal pain began today  The abdominal pain is generalized  The abdominal pain does not radiate  The illness does not include back pain  Prior to Admission Medications   Prescriptions Last Dose Informant Patient Reported? Taking?    Safinamide Mesylate (Xadago) 100 MG TABS   Yes Yes   Sig: Take 100 mg by mouth daily    aspirin (ECOTRIN LOW STRENGTH) 81 mg EC tablet   Yes Yes   Sig: Take 81 mg by mouth   atorvastatin (LIPITOR) 80 mg tablet   Yes Yes   Sig: Take 80 mg by mouth   carbidopa-levodopa (SINEMET CR)  mg per tablet  Self Yes Yes   Sig: Take 1 tablet by mouth 5 (five) times a day    cholecalciferol (VITAMIN D3) 1,000 units tablet  Self Yes Yes   Sig: Take 1,000 Units by mouth daily   clopidogrel (PLAVIX) 75 mg tablet   Yes Yes   Sig: Take 75 mg by mouth daily   metoprolol tartrate (LOPRESSOR) 25 mg tablet   No Yes   Sig: Take 1 tablet (25 mg total) by mouth every 12 (twelve) hours   pantoprazole (PROTONIX) 40 mg tablet   Yes Yes   Sig: Take 40 mg by mouth   polyethylene glycol (MIRALAX) 17 g packet  Self Yes Yes   Sig: Take 17 g by mouth daily   senna-docusate sodium (SENOKOT S) 8 6-50 mg per tablet   No Yes   Sig: Take 1 tablet by mouth daily at bedtime   vitamin B-12 (VITAMIN B-12) 1,000 mcg tablet  Self Yes Yes   Sig: Take by mouth daily      Facility-Administered Medications: None       Past Medical History:   Diagnosis Date    [facility]     [facility]     UTI (urinary tract infection)        Past Surgical History:   Procedure Laterality Date    BREAST SURGERY      CARDIAC SURGERY      HYSTERECTOMY      TUBAL LIGATION         Family History   Problem Relation Age of Onset    Cancer Mother     Alzheimer's disease Mother     Heart disease Father      I have reviewed and agree with the history as documented  E-Cigarette/Vaping    E-Cigarette Use Never User      E-Cigarette/Vaping Substances    Nicotine No     THC No     CBD No     Flavoring No     Other No     Unknown No      Social History     Tobacco Use    Smoking status: Never Smoker    Smokeless tobacco: Never Used   Vaping Use    Vaping Use: Never used   Substance Use Topics    Alcohol use: Not Currently    Drug use: Not Currently       Review of Systems   Constitutional: Positive for activity change, appetite change, fatigue and weight loss  Negative for fever     HENT: Negative for congestion, ear pain, rhinorrhea, sinus pressure and sore throat  Eyes: Negative  Negative for redness and itching  Respiratory: Negative for cough, chest tightness, shortness of breath and wheezing  Cardiovascular: Negative for chest pain, palpitations and leg swelling  Gastrointestinal: Positive for abdominal pain, nausea and vomiting  Negative for diarrhea, hematemesis, hematochezia and melena  Endocrine: Negative  Genitourinary: Negative for dysuria, flank pain and frequency  Musculoskeletal: Negative for arthralgias, back pain and myalgias  Skin: Negative  Negative for pallor and rash  Allergic/Immunologic: Negative  Neurological: Positive for weakness  Negative for dizziness, light-headedness and headaches  Hematological: Negative  Psychiatric/Behavioral: Negative  All other systems reviewed and are negative  Physical Exam  Physical Exam  Vitals and nursing note reviewed  Constitutional:       General: She is awake  She is not in acute distress  Appearance: Normal appearance  She is well-developed  She is cachectic  She is ill-appearing  She is not toxic-appearing  HENT:      Head: Normocephalic and atraumatic  Hair is normal       Jaw: No pain on movement  Right Ear: External ear normal       Left Ear: External ear normal       Nose: Nose normal       Mouth/Throat:      Mouth: Mucous membranes are dry  Pharynx: Oropharynx is clear  Eyes:      General: Lids are normal  No scleral icterus  Extraocular Movements: Extraocular movements intact  Pupils: Pupils are equal, round, and reactive to light  Cardiovascular:      Rate and Rhythm: Normal rate and regular rhythm  Heart sounds: Normal heart sounds  No murmur heard  No gallop  Pulmonary:      Effort: Pulmonary effort is normal  No respiratory distress  Breath sounds: Normal breath sounds  No stridor  No wheezing or rales  Abdominal:      General: Abdomen is flat  Bowel sounds are decreased  There is no distension  Palpations: Abdomen is soft  Tenderness: There is generalized abdominal tenderness  There is no right CVA tenderness, left CVA tenderness, guarding or rebound  Negative signs include Krishnamurthy's sign  Hernia: No hernia is present  Musculoskeletal:         General: No deformity  Normal range of motion  Cervical back: Normal range of motion and neck supple  Skin:     General: Skin is warm and dry  Capillary Refill: Capillary refill takes less than 2 seconds  Coloration: Skin is pale  Skin is not jaundiced  Findings: No bruising or rash  Neurological:      General: No focal deficit present  Mental Status: She is alert and oriented to person, place, and time  Mental status is at baseline  Cranial Nerves: No cranial nerve deficit  Motor: No weakness  Psychiatric:         Attention and Perception: Attention normal          Mood and Affect: Mood normal          Speech: Speech normal          Behavior: Behavior normal          Thought Content:  Thought content normal          Judgment: Judgment normal          Vital Signs  ED Triage Vitals   Temperature Pulse Respirations Blood Pressure SpO2   09/03/21 0848 09/03/21 0848 09/03/21 0848 09/03/21 0848 09/03/21 0848   97 9 °F (36 6 °C) 69 18 161/75 98 %      Temp Source Heart Rate Source Patient Position - Orthostatic VS BP Location FiO2 (%)   09/03/21 0848 09/03/21 0900 09/03/21 0900 09/03/21 0900 --   Oral Monitor Lying Right arm       Pain Score       09/03/21 1421       4           Vitals:    09/03/21 1500 09/03/21 1530 09/03/21 1800 09/1934   BP: 129/60 125/61 110/64 152/71   Pulse: 67 64 72 71   Patient Position - Orthostatic VS: Lying Lying           Visual Acuity      ED Medications  Medications   lactated ringers infusion (0 mL/hr Intravenous Stopped 9/3/21 1533)   sodium chloride 0 9 % bolus 1,000 mL (0 mL Intravenous Stopped 9/3/21 1533)   ondansetron (ZOFRAN) injection 4 mg (4 mg Intravenous Given 9/3/21 7277)   pantoprazole (PROTONIX) injection 40 mg (40 mg Intravenous Given 9/3/21 0955)   metoclopramide (REGLAN) injection 10 mg (10 mg Intravenous Given 9/3/21 1421)   acetaminophen (TYLENOL) tablet 650 mg (650 mg Oral Given 9/3/21 1421)       Diagnostic Studies  Results Reviewed     Procedure Component Value Units Date/Time    CBC and differential [[id number]]  (Abnormal) Collected: 09/03/21 0859    Lab Status: Final result Specimen: Blood from Arm, Left Updated: 09/03/21 0952     WBC 3 52 Thousand/uL      RBC 3 15 Million/uL      Hemoglobin 10 0 g/dL      Hematocrit 31 0 %      MCV 98 fL      MCH 31 7 pg      MCHC 32 3 g/dL      RDW 21 5 %      Platelets 720 Thousands/uL      nRBC 0 /100 WBCs      Neutrophils Relative 72 %      Immat GRANS % 1 %      Lymphocytes Relative 16 %      Monocytes Relative 9 %      Eosinophils Relative 1 %      Basophils Relative 1 %      Neutrophils Absolute 2 52 Thousands/µL      Immature Grans Absolute 0 04 Thousand/uL      Lymphocytes Absolute 0 56 Thousands/µL      Monocytes Absolute 0 32 Thousand/µL      Eosinophils Absolute 0 04 Thousand/µL      Basophils Absolute 0 04 Thousands/µL     Comprehensive metabolic panel [[id number]]  (Abnormal) Collected: 09/03/21 0859    Lab Status: Final result Specimen: Blood from Arm, Left Updated: 09/03/21 0934     Sodium 135 mmol/L      Potassium 4 0 mmol/L      Chloride 99 mmol/L      CO2 28 mmol/L      ANION GAP 8 mmol/L      BUN 18 mg/dL      Creatinine 0 98 mg/dL      Glucose 83 mg/dL      Calcium 8 6 mg/dL      AST 59 U/L      ALT 8 U/L      Alkaline Phosphatase 104 U/L      Total Protein 6 7 g/dL      Albumin 4 0 g/dL      Total Bilirubin 2 47 mg/dL      eGFR 52 ml/min/1 73sq m     Narrative:      [place] guidelines for Chronic Kidney Disease (CKD):     Stage 1 with normal or high GFR (GFR > 90 mL/min/1 73 square meters)    Stage 2 Mild CKD (GFR = 60-89 mL/min/1 73 square meters)    Stage 3A Moderate CKD (GFR = 45-59 mL/min/1 73 square meters)    Stage 3B Moderate CKD (GFR = 30-44 mL/min/1 73 square meters)    Stage 4 Severe CKD (GFR = 15-29 mL/min/1 73 square meters)    Stage 5 End Stage CKD (GFR <15 mL/min/1 73 square meters)  Note: GFR calculation is accurate only with a steady state creatinine    Lipase [[id number]]  (Normal) Collected: 09/03/21 0859    Lab Status: Final result Specimen: Blood from Arm, Left Updated: 09/03/21 0934     Lipase 154 u/L     Lactic acid, plasma [[id number]]  (Normal) Collected: 09/03/21 0859    Lab Status: Final result Specimen: Blood from Arm, Left Updated: 09/03/21 0926     LACTIC ACID 1 2 mmol/L     Narrative:      Result may be elevated if tourniquet was used during collection  US right upper quadrant   Final Result by Keyla Tabares MD (09/03 1505)      Gallbladder sludge  Otherwise, no evidence of biliary ductal dilatation  Hepatic and right renal cysts  Workstation performed: [id number]                    Procedures  ECG 12 Lead Documentation Only    Date/Time: 9/3/2021 10:20 AM  Performed by: Madi Márquez DO  Authorized by: Madi Márquez DO     Indications / Diagnosis:  Weakness and nausea  Patient location:  ED  Previous ECG:     Previous ECG:  Compared to current    Comparison ECG info:  Now in a normal sinus rhythm when compared to old EKG from 08/09/2021    Similarity:  Changes noted  Interpretation:     Interpretation: non-specific    Rate:     ECG rate assessment: normal    Rhythm:     Rhythm: sinus rhythm    Ectopy:     Ectopy: none    QRS:     QRS axis:  Normal  Conduction:     Conduction: normal    ST segments:     ST segments:  Non-specific  T waves:     T waves: non-specific               ED Course  ED Course as of Sep 03 1942   Fri Sep 03, 2021   1021 Patient's hemoglobin is normal on this visit  Given the persistent nausea vomiting will discuss with Gastroenterology        [phone] Also discussed with hospitalist service and they recommend admission      1313 Attempting to transfer patient to [facility] for bed management and leveling purposes  Patient at this time reports that her headache is improved and her nausea is last       36 Spoke with Dr Mike Haddad at [facility] and patient was accepted to that facility      1640 Patient has an assigned bed at [facility]  Bed still occupied      [address] to nursing supervisor  We now have a bed available at [facility]  Patient would prefer to stay at our facility  Transfer has been canceled  PACs is been informed  Family has been informed  Patient will be moved upstairs                                SBIRT 22yo+      Most Recent Value   SBIRT (24 yo +)   In order to provide better care to our patients, we are screening all of our patients for alcohol and drug use  Would it be okay to ask you these screening questions?   No Filed at: 09/03/2021 0853                    MDM  Number of Diagnoses or Management Options  Gastritis: established and worsening  Nausea & vomiting: established and worsening     Amount and/or Complexity of Data Reviewed  Clinical lab tests: ordered and reviewed  Decide to obtain previous medical records or to obtain history from someone other than the patient: yes  Discuss the patient with other providers: yes    Risk of Complications, Morbidity, and/or Mortality  Presenting problems: moderate  Diagnostic procedures: moderate  Management options: moderate    Patient Progress  Patient progress: stable      Disposition  Final diagnoses:   Gastritis   Nausea & vomiting     Time reflects when diagnosis was documented in both MDM as applicable and the Disposition within this note     Time User Action Codes Description Comment    9/3/2021 11:12 AM Kirt Rucker Add [R11 2] Nausea and vomiting     9/3/2021  1:14 PM Kirt Rucker Add [K29 70] Gastritis     9/3/2021  1:14 PM Kirt Rucker Modify [K29 70] Gastritis     9/3/2021  1:14 PM Bonfante, MUHOS Remove [R11 2] Nausea and vomiting     9/3/2021  1:14 PM Joycelyn Zaragoza Add [R11 2] Nausea & vomiting       ED Disposition     ED Disposition Condition Date/Time Comment    Admit Stable Fri Sep 3, 2021  6:49 PM         MD Documentation      Most Recent Value   Patient Condition  The patient has been stabilized such that within reasonable medical probability, no material deterioration of the patient condition or the condition of the unborn child(dustin) is likely to result from the transfer   Reason for Transfer  No bed available at level of patient's needs   Risks of Transfer  Potential for delay in receiving treatment, Potential deterioration of medical condition, Loss of IV, Increased discomfort during transfer, Possible worsening of condition or death during transfer   Accepting Physician  Dr Gayatri Fan Name, [facility] [address], [facility]    (Name & Tel number)  Tony Vernon   Sending MD Mindi Cochran   Provider Certification  General risk, such as traffic hazards, adverse weather conditions, rough terrain or turbulence, possible failure of equipment (including vehicle or aircraft), or consequences of actions of persons outside the control of the transport personnel, Risk of worsening condition, Unanticipated needs of medical equipment and personnel during transport, The possibility of a transport vehicle being unavailable      RN Documentation      [address] Name, [facility] [address], [place] [facility] AT [facility] (Name & Tel number)  Tony Vernon      Follow-up Information    None         Current Discharge Medication List      CONTINUE these medications which have NOT CHANGED    Details   aspirin (ECOTRIN LOW STRENGTH) 81 mg EC tablet Take 81 mg by mouth      atorvastatin (LIPITOR) 80 mg tablet Take 80 mg by mouth      carbidopa-levodopa (SINEMET CR)  mg per tablet Take 1 tablet by mouth 5 (five) times a day cholecalciferol (VITAMIN D3) 1,000 units tablet Take 1,000 Units by mouth daily      clopidogrel (PLAVIX) 75 mg tablet Take 75 mg by mouth daily      metoprolol tartrate (LOPRESSOR) 25 mg tablet Take 1 tablet (25 mg total) by mouth every 12 (twelve) hours  Qty: 60 tablet, Refills: 0    Associated Diagnoses: New onset atrial fibrillation (HCC)      pantoprazole (PROTONIX) 40 mg tablet Take 40 mg by mouth      polyethylene glycol (MIRALAX) 17 g packet Take 17 g by mouth daily      Safinamide Mesylate (Xadago) 100 MG TABS Take 100 mg by mouth daily       senna-docusate sodium (SENOKOT S) 8 6-50 mg per tablet Take 1 tablet by mouth daily at bedtime  Qty: 30 tablet, Refills: 0    Associated Diagnoses: Slow transit constipation      vitamin B-12 (VITAMIN B-12) 1,000 mcg tablet Take by mouth daily           No discharge procedures on file      PDMP Review     None          ED Provider  Electronically Signed by           Adriana Marquez,   09/03/21 200 Mary Mcgrath,   09/03/21 0910

## 2021-09-04 PROBLEM — D61.818 PANCYTOPENIA (HCC): Status: ACTIVE | Noted: 2021-09-04

## 2021-09-04 LAB
ALBUMIN SERPL BCP-MCNC: 3.2 G/DL (ref 3.5–5)
ALP SERPL-CCNC: 92 U/L (ref 46–116)
ALT SERPL W P-5'-P-CCNC: 7 U/L (ref 12–78)
ANION GAP SERPL CALCULATED.3IONS-SCNC: 11 MMOL/L (ref 4–13)
AST SERPL W P-5'-P-CCNC: 55 U/L (ref 5–45)
BILIRUB SERPL-MCNC: 2.14 MG/DL (ref 0.2–1)
BUN SERPL-MCNC: 15 MG/DL (ref 5–25)
CALCIUM ALBUM COR SERPL-MCNC: 8.6 MG/DL (ref 8.3–10.1)
CALCIUM SERPL-MCNC: 8 MG/DL (ref 8.3–10.1)
CHLORIDE SERPL-SCNC: 103 MMOL/L (ref 100–108)
CO2 SERPL-SCNC: 24 MMOL/L (ref 21–32)
CREAT SERPL-MCNC: 0.77 MG/DL (ref 0.6–1.3)
ERYTHROCYTE [DISTWIDTH] IN BLOOD BY AUTOMATED COUNT: 21.2 % (ref 11.6–15.1)
GFR SERPL CREATININE-BSD FRML MDRD: 70 ML/MIN/1.73SQ M
GLUCOSE SERPL-MCNC: 68 MG/DL (ref 65–140)
HCT VFR BLD AUTO: 24.9 % (ref 34.8–46.1)
HGB BLD-MCNC: 8.1 G/DL (ref 11.5–15.4)
MCH RBC QN AUTO: 32.1 PG (ref 26.8–34.3)
MCHC RBC AUTO-ENTMCNC: 32.5 G/DL (ref 31.4–37.4)
MCV RBC AUTO: 99 FL (ref 82–98)
PLATELET # BLD AUTO: 92 THOUSANDS/UL (ref 149–390)
POTASSIUM SERPL-SCNC: 4.2 MMOL/L (ref 3.5–5.3)
PROT SERPL-MCNC: 5.4 G/DL (ref 6.4–8.2)
RBC # BLD AUTO: 2.52 MILLION/UL (ref 3.81–5.12)
SODIUM SERPL-SCNC: 138 MMOL/L (ref 136–145)
TSH SERPL DL<=0.05 MIU/L-ACNC: 1.61 UIU/ML (ref 0.36–3.74)
WBC # BLD AUTO: 3.32 THOUSAND/UL (ref 4.31–10.16)

## 2021-09-04 PROCEDURE — 84443 ASSAY THYROID STIM HORMONE: CPT | Performed by: INTERNAL MEDICINE

## 2021-09-04 PROCEDURE — 85027 COMPLETE CBC AUTOMATED: CPT | Performed by: INTERNAL MEDICINE

## 2021-09-04 PROCEDURE — C9113 INJ PANTOPRAZOLE SODIUM, VIA: HCPCS | Performed by: INTERNAL MEDICINE

## 2021-09-04 PROCEDURE — 99232 SBSQ HOSP IP/OBS MODERATE 35: CPT | Performed by: INTERNAL MEDICINE

## 2021-09-04 PROCEDURE — 80053 COMPREHEN METABOLIC PANEL: CPT | Performed by: INTERNAL MEDICINE

## 2021-09-04 RX ORDER — ALPRAZOLAM 0.25 MG/1
0.25 TABLET ORAL 3 TIMES DAILY PRN
Status: DISCONTINUED | OUTPATIENT
Start: 2021-09-04 | End: 2021-09-09 | Stop reason: HOSPADM

## 2021-09-04 RX ORDER — CARBIDOPA AND LEVODOPA 50; 200 MG/1; MG/1
1 TABLET, EXTENDED RELEASE ORAL ONCE
Status: COMPLETED | OUTPATIENT
Start: 2021-09-04 | End: 2021-09-04

## 2021-09-04 RX ADMIN — ATORVASTATIN CALCIUM 80 MG: 40 TABLET, FILM COATED ORAL at 08:05

## 2021-09-04 RX ADMIN — CARBIDOPA AND LEVODOPA 1 TABLET: 50; 200 TABLET, EXTENDED RELEASE ORAL at 05:56

## 2021-09-04 RX ADMIN — PANTOPRAZOLE SODIUM 40 MG: 40 INJECTION, POWDER, FOR SOLUTION INTRAVENOUS at 20:36

## 2021-09-04 RX ADMIN — POLYETHYLENE GLYCOL 3350 17 G: 17 POWDER, FOR SOLUTION ORAL at 08:05

## 2021-09-04 RX ADMIN — CARBIDOPA AND LEVODOPA 1 TABLET: 50; 200 TABLET, EXTENDED RELEASE ORAL at 18:50

## 2021-09-04 RX ADMIN — METOPROLOL TARTRATE 25 MG: 25 TABLET, FILM COATED ORAL at 08:05

## 2021-09-04 RX ADMIN — ONDANSETRON 4 MG: 2 INJECTION INTRAMUSCULAR; INTRAVENOUS at 03:54

## 2021-09-04 RX ADMIN — ENOXAPARIN SODIUM 40 MG: 40 INJECTION SUBCUTANEOUS at 08:05

## 2021-09-04 RX ADMIN — SAFINAMIDE MESYLATE 100 MG: 100 TABLET, FILM COATED ORAL at 08:06

## 2021-09-04 RX ADMIN — ONDANSETRON 4 MG: 2 INJECTION INTRAMUSCULAR; INTRAVENOUS at 15:40

## 2021-09-04 RX ADMIN — CARBIDOPA AND LEVODOPA 1 TABLET: 50; 200 TABLET, EXTENDED RELEASE ORAL at 23:42

## 2021-09-04 RX ADMIN — CARBIDOPA AND LEVODOPA 1 TABLET: 50; 200 TABLET, EXTENDED RELEASE ORAL at 13:43

## 2021-09-04 RX ADMIN — PANTOPRAZOLE SODIUM 40 MG: 40 INJECTION, POWDER, FOR SOLUTION INTRAVENOUS at 08:05

## 2021-09-04 RX ADMIN — ASPIRIN 81 MG: 81 TABLET, COATED ORAL at 08:05

## 2021-09-04 RX ADMIN — SODIUM CHLORIDE, SODIUM LACTATE, POTASSIUM CHLORIDE, AND CALCIUM CHLORIDE 100 ML/HR: .6; .31; .03; .02 INJECTION, SOLUTION INTRAVENOUS at 20:26

## 2021-09-04 RX ADMIN — DOCUSATE SODIUM AND SENNOSIDES 1 TABLET: 8.6; 5 TABLET ORAL at 20:36

## 2021-09-04 RX ADMIN — CARBIDOPA AND LEVODOPA 1 TABLET: 50; 200 TABLET, EXTENDED RELEASE ORAL at 10:52

## 2021-09-04 RX ADMIN — METOCLOPRAMIDE HYDROCHLORIDE 10 MG: 5 INJECTION INTRAMUSCULAR; INTRAVENOUS at 17:14

## 2021-09-04 RX ADMIN — ALPRAZOLAM 0.25 MG: 0.25 TABLET ORAL at 23:42

## 2021-09-04 RX ADMIN — SODIUM CHLORIDE, SODIUM LACTATE, POTASSIUM CHLORIDE, AND CALCIUM CHLORIDE 100 ML/HR: .6; .31; .03; .02 INJECTION, SOLUTION INTRAVENOUS at 09:26

## 2021-09-04 RX ADMIN — METOPROLOL TARTRATE 25 MG: 25 TABLET, FILM COATED ORAL at 20:36

## 2021-09-04 RX ADMIN — ALPRAZOLAM 0.25 MG: 0.25 TABLET ORAL at 03:44

## 2021-09-04 NOTE — ASSESSMENT & PLAN NOTE
Nonspecific - adominal ultrasound without pathology - serum bilirubin level downtrending on IV fluids    Results from last 7 days   Lab Units 09/04/21  0600 09/03/21  0859   TOTAL BILIRUBIN mg/dL 2 14* 2 47*

## 2021-09-04 NOTE — ASSESSMENT & PLAN NOTE
· AFib diagnosed during last admission  Was started on metoprolol 25 mg b i d   · Patient not on anticoagulation due to recent anemia ?  requiring blood transfusion during last hospitalization and patient was discharged in sinus rhythm  · Anticoagulation to be deferred to cardiology as outpatient

## 2021-09-04 NOTE — ASSESSMENT & PLAN NOTE
· Patient reports constipation however really has had poor intake  · Will continue docusate/senna with miralax

## 2021-09-04 NOTE — ASSESSMENT & PLAN NOTE
BMI of 16 4 in the setting of decreased appetite/oral intake and evidence of muscle wasting/atrophy on exam  Initiated nutritional supplementation with meals

## 2021-09-04 NOTE — ASSESSMENT & PLAN NOTE
· [facility] [place] [facility] with complications as listed above  · Saw CTS on 8/31/2021 with recommendations to stop clopidogrel especially with patient's complaint that this was causing her GI upset

## 2021-09-04 NOTE — PROGRESS NOTES
[address]  Progress Note Gabo Abad 1934, 80 y o  female MRN: [id number]  Unit/Bed#: -01 Encounter: [id number]  Primary Care Provider: Stevie Boudreaux   Date and time admitted to hospital: 9/3/2021  8:43 AM      * Intractable nausea and vomiting  Assessment & Plan  Recurrent admission for intractable nausea/vomiting - had recent TAVR in July 2021 w/ subsequent hospitalization in early August for nausea/vomiting w/ transient resolution - now returns with complaints of recurrent nausea/vomiting and associated poor appetite over the last few weeks  ED provider discussed with on-call gastroenterologist -> tentative endoscopy this admission  C/w IV PPI regimen - c/w IV fluid hydration - monitor/replete electrolyte deficiencies if present  PRN emesis control    History of transcatheter aortic valve replacement (TAVR)  Assessment & Plan  History of TAVR @ [facility] recently on July 9538 with 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  Saw CT surgery on 8/31/21 w/ recommendation to stop Plavix due to patient complaints of GI upset  Serial outpatient followups    Pancytopenia  Assessment & Plan  Monitor CBC and transfuse as necessary    Chronic diastolic CHF  Assessment & Plan  Continue beta-blockade with Lopressor - not chronically on diuretics  Echocardiogram @ [address] in June 2021 w/ an EF of 60-65%    Parkinson's disease   Assessment & Plan  C/w Sinemet/Xadago  PT/OT as tolerated    Paroxysmal atrial fibrillation   Assessment & Plan  Rate controlled on Lopressor - c/w ASA - not on chronic anticoagulation - outpatient follow-up with cardiology    Severe protein-calorie malnutrition  Assessment & Plan  BMI of 16 4 in the setting of decreased appetite/oral intake and evidence of muscle wasting/atrophy on exam  Initiated nutritional supplementation with meals    Hyperbilirubinemia  Assessment & Plan  Nonspecific - adominal ultrasound without pathology - serum bilirubin level downtrending on IV fluids    Results from last 7 days   Lab Units 21  0600 21  0859   TOTAL BILIRUBIN mg/dL 2 14* 2 47*       DVT Prophylaxis:  Lovenox       Patient Centered Rounds:  I have performed bedside rounds and discussed plan of care with nursing today  Discussions with Specialists or Other Care Team Provider:  see above assessments if applicable    Education and Discussions with Family / Patient: Patient at bedside    Time Spent for Care:  32 minutes  More than 50% of total time spent on counseling and coordination of care as described above  Current Length of Stay: 1 day(s)    Current Patient Status: Inpatient   Certification Statement:  Patient will continue to require additional hospital stay due to assessments as noted above  Code Status: Level 3 - DNAR and DNI        Subjective:     Seen/examined earlier in the day  Resting bed but does complain of some weakness/fatigue  States her nausea has improved today but still having intermittent episodes  Denies vomiting, however today  Objective:     Vitals:   Temp (24hrs), Av °F (36 7 °C), Min:97 9 °F (36 6 °C), Max:98 2 °F (36 8 °C)    Temp:  [97 9 °F (36 6 °C)-98 2 °F (36 8 °C)] 98 2 °F (36 8 °C)  HR:  [71-75] 71  Resp:  [17-20] 20  BP: (110-152)/(63-71) 128/63  SpO2:  [97 %-100 %] 97 %  Body mass index is 16 4 kg/m²  Input and Output Summary (last 24 hours):        Intake/Output Summary (Last 24 hours) at 2021 1530  Last data filed at 2021 1304  Gross per 24 hour   Intake 3128 33 ml   Output 200 ml   Net 2928 33 ml       Physical Exam:     GENERAL:  Cachectic-appearing - weak/fatigued  HEAD:  Normocephalic - atraumatic - temporal wasting present  EYES: PERRL - EOMI   MOUTH:  Mucosa moist  NECK:  Supple - full range of motion - clavicular wasting noted  CARDIAC:  Rate controlled - S1/S2 positive  PULMONARY:  Fairly clear to auscultation - nonlabored respirations  ABDOMEN:  Soft - currently nontender/nondistended - active bowel sounds  MUSCULOSKELETAL:  Motor strength/range of motion deconditioned  NEUROLOGIC:  Alert/oriented at baseline  SKIN:  Chronic wrinkles/blemishes   PSYCHIATRIC:  Mood/affect stable      Additional Data:     Labs & Recent Cultures:    Results from last 7 days   Lab Units 09/04/21  0600 09/03/21  0859   WBC Thousand/uL 3 32* 3 52*   HEMOGLOBIN g/dL 8 1* 10 0*   HEMATOCRIT % 24 9* 31 0*   PLATELETS Thousands/uL 92* 107*   NEUTROS PCT %  --  72   LYMPHS PCT %  --  16   MONOS PCT %  --  9   EOS PCT %  --  1     Results from last 7 days   Lab Units 09/04/21  0600   POTASSIUM mmol/L 4 2   CHLORIDE mmol/L 103   CO2 mmol/L 24   BUN mg/dL 15   CREATININE mg/dL 0 77   CALCIUM mg/dL 8 0*   ALK PHOS U/L 92   ALT U/L 7*   AST U/L 55*                 Results from last 7 days   Lab Units 09/03/21  0859   LACTIC ACID mmol/L 1 2                 Last 24 Hours Medication List:   Current Facility-Administered Medications   Medication Dose Route Frequency Provider Last Rate    acetaminophen  650 mg Oral Q6H PRN Percy Schulte, DO      ALPRAZolam  0 25 mg Oral TID PRN Kaila Gomez PA-C      aspirin  81 mg Oral Daily Tejinder Hinton, DO      atorvastatin  80 mg Oral Daily Percy Schulte, DO      carbidopa-levodopa  1 tablet Oral 5x Daily Tejinder Hinton, DO      enoxaparin  40 mg Subcutaneous Daily Tejinder Hinton, DO      lactated ringers  100 mL/hr Intravenous Continuous Kennedy Abdullahi  mL/hr (09/04/21 0926)    metoclopramide  10 mg Intravenous Q6H PRN Percy Schulte, DO      metoprolol tartrate  25 mg Oral Q12H [address] Tejinder Hinton, DO      ondansetron  4 mg Intravenous Q4H PRN Percy Schulte, DO      pantoprazole  40 mg Intravenous Q12H [address] Tejinder Hinton, DO      pneumococcal 13-valent conjugate vaccine  0 5 mL Intramuscular Prior to discharge KIMMY RaiC      polyethylene glycol  17 g Oral Daily Percy Schulte,       Safinamide Mesylate  100 mg Oral Daily Jovita hernandez DO      senna-docusate sodium  1 tablet Oral HS Great mary, DO                        ** Please Note: This note is constructed using a voice recognition dictation system  An occasional wrong word/phrase or sound-a-like substitution may have been picked up by dictation device due to the inherent limitations of voice recognition software  Read the chart carefully and recognize, using reasonable context, where substitutions may have occurred  **

## 2021-09-04 NOTE — ASSESSMENT & PLAN NOTE
Wt Readings from Last 3 Encounters:   09/03/21 44 7 kg (98 lb 8 7 oz)   08/11/21 48 4 kg (106 lb 12 8 oz)     · Compensated    Monitor on gentle IV fluids

## 2021-09-04 NOTE — PLAN OF CARE
Problem: Potential for Falls  Goal: Patient will remain free of falls  Description: INTERVENTIONS:  - Educate patient/family on patient safety including physical limitations  - Instruct patient to call for assistance with activity   - Consult OT/PT to assist with strengthening/mobility   - Keep Call bell within reach  - Keep bed low and locked with side rails adjusted as appropriate  - Keep care items and personal belongings within reach  - Initiate and maintain comfort rounds  - Make Fall Risk Sign visible to staff    - Apply yellow socks and bracelet for high fall risk patients  - Consider moving patient to room near nurses station  Outcome: Progressing     Problem: MOBILITY - ADULT  Goal: Maintain or return to baseline ADL function  Description: INTERVENTIONS:  -  Assess patient's ability to carry out ADLs; assess patient's baseline for ADL function and identify physical deficits which impact ability to perform ADLs (bathing, care of mouth/teeth, toileting, grooming, dressing, etc )  - Assess/evaluate cause of self-care deficits   - Assess range of motion  - Assess patient's mobility; develop plan if impaired  - Assess patient's need for assistive devices and provide as appropriate  - Encourage maximum independence but intervene and supervise when necessary  - Involve family in performance of ADLs  - Assess for home care needs following discharge   - Consider OT consult to assist with ADL evaluation and planning for discharge  - Provide patient education as appropriate  Outcome: Progressing  Goal: Maintains/Returns to pre admission functional level  Description: INTERVENTIONS:  - Perform BMAT or MOVE assessment daily    - Set and communicate daily mobility goal to care team and patient/family/caregiver     - Collaborate with rehabilitation services on mobility goals if consulted    - Out of bed for toileting  - Record patient progress and toleration of activity level   Outcome: Progressing     Problem: Nutrition/Hydration-ADULT  Goal: Nutrient/Hydration intake appropriate for improving, restoring or maintaining nutritional needs  Description: Monitor and assess patient's nutrition/hydration status for malnutrition  Collaborate with interdisciplinary team and initiate plan and interventions as ordered  Monitor patient's weight and dietary intake as ordered or per policy  Utilize nutrition screening tool and intervene as necessary  Determine patient's food preferences and provide high-protein, high-caloric foods as appropriate       INTERVENTIONS:  - Monitor oral intake, urinary output, labs, and treatment plans  - Assess nutrition and hydration status and recommend course of action  - Evaluate amount of meals eaten  - Assist patient with eating if necessary   - Allow adequate time for meals  - Recommend/ encourage appropriate diets, oral nutritional supplements, and vitamin/mineral supplements  - Order, calculate, and assess calorie counts as needed  - Recommend, monitor, and adjust tube feedings and TPN/PPN based on assessed needs  - Assess need for intravenous fluids  - Provide specific nutrition/hydration education as appropriate  - Include patient/family/caregiver in decisions related to nutrition  Outcome: Progressing     Problem: PAIN - ADULT  Goal: Verbalizes/displays adequate comfort level or baseline comfort level  Description: Interventions:  - Encourage patient to monitor pain and request assistance  - Assess pain using appropriate pain scale  - Administer analgesics based on type and severity of pain and evaluate response  - Implement non-pharmacological measures as appropriate and evaluate response  - Consider cultural and social influences on pain and pain management  - Notify physician/advanced practitioner if interventions unsuccessful or patient reports new pain  Outcome: Progressing     Problem: INFECTION - ADULT  Goal: Absence or prevention of progression during hospitalization  Description: INTERVENTIONS:  - Assess and monitor for signs and symptoms of infection  - Monitor lab/diagnostic results  - Monitor all insertion sites, i e  indwelling lines, tubes, and drains  - Monitor endotracheal if appropriate and nasal secretions for changes in amount and color  - [place] appropriate cooling/warming therapies per order  - Administer medications as ordered  - Instruct and encourage patient and family to use good hand hygiene technique  - Identify and instruct in appropriate isolation precautions for identified infection/condition  Outcome: Progressing  Goal: Absence of fever/infection during neutropenic period  Description: INTERVENTIONS:  - Monitor WBC    Outcome: Progressing     Problem: SAFETY ADULT  Goal: Patient will remain free of falls  Description: INTERVENTIONS:  - Educate patient/family on patient safety including physical limitations  - Instruct patient to call for assistance with activity   - Consult OT/PT to assist with strengthening/mobility   - Keep Call bell within reach  - Keep bed low and locked with side rails adjusted as appropriate  - Keep care items and personal belongings within reach  - Initiate and maintain comfort rounds  - Make Fall Risk Sign visible to staff    - Apply yellow socks and bracelet for high fall risk patients  - Consider moving patient to room near nurses station  Outcome: Progressing  Goal: Maintain or return to baseline ADL function  Description: INTERVENTIONS:  -  Assess patient's ability to carry out ADLs; assess patient's baseline for ADL function and identify physical deficits which impact ability to perform ADLs (bathing, care of mouth/teeth, toileting, grooming, dressing, etc )  - Assess/evaluate cause of self-care deficits   - Assess range of motion  - Assess patient's mobility; develop plan if impaired  - Assess patient's need for assistive devices and provide as appropriate  - Encourage maximum independence but intervene and supervise when necessary  - Involve family in performance of ADLs  - Assess for home care needs following discharge   - Consider OT consult to assist with ADL evaluation and planning for discharge  - Provide patient education as appropriate  Outcome: Progressing  Goal: Maintains/Returns to pre admission functional level  Description: INTERVENTIONS:  - Perform BMAT or MOVE assessment daily    - Set and communicate daily mobility goal to care team and patient/family/caregiver  - Collaborate with rehabilitation services on mobility goals if consulted    - Out of bed for toileting  - Record patient progress and toleration of activity level   Outcome: Progressing     Problem: DISCHARGE PLANNING  Goal: Discharge to home or other facility with appropriate resources  Description: INTERVENTIONS:  - Identify barriers to discharge w/patient and caregiver  - Arrange for needed discharge resources and transportation as appropriate  - Identify discharge learning needs (meds, wound care, etc )  - Arrange for interpretive services to assist at discharge as needed  - Refer to Case Management Department for coordinating discharge planning if the patient needs post-hospital services based on physician/advanced practitioner order or complex needs related to functional status, cognitive ability, or social support system  Outcome: Progressing     Problem: Knowledge Deficit  Goal: Patient/family/caregiver demonstrates understanding of disease process, treatment plan, medications, and discharge instructions  Description: Complete learning assessment and assess knowledge base    Interventions:  - Provide teaching at level of understanding  - Provide teaching via preferred learning methods  Outcome: Progressing     Problem: GASTROINTESTINAL - ADULT  Goal: Minimal or absence of nausea and/or vomiting  Description: INTERVENTIONS:  - Administer IV fluids if ordered to ensure adequate hydration  - Maintain NPO status until nausea and vomiting are resolved  - Nasogastric tube if ordered  - Administer ordered antiemetic medications as needed  - Provide nonpharmacologic comfort measures as appropriate  - Advance diet as tolerated, if ordered  - Consider nutrition services referral to assist patient with adequate nutrition and appropriate food choices  Outcome: Progressing  Goal: Maintains adequate nutritional intake  Description: INTERVENTIONS:  - Monitor percentage of each meal consumed  - Identify factors contributing to decreased intake, treat as appropriate  - Assist with meals as needed  - Monitor I&O, weight, and lab values if indicated  - Obtain nutrition services referral as needed  Outcome: Progressing     Problem: METABOLIC, FLUID AND ELECTROLYTES - ADULT  Goal: Electrolytes maintained within normal limits  Description: INTERVENTIONS:  - Monitor labs and assess patient for signs and symptoms of electrolyte imbalances  - Administer electrolyte replacement as ordered  - Monitor response to electrolyte replacements, including repeat lab results as appropriate  - Instruct patient on fluid and nutrition as appropriate  Outcome: Progressing  Goal: Fluid balance maintained  Description: INTERVENTIONS:  - Monitor labs   - Monitor I/O and WT  - Instruct patient on fluid and nutrition as appropriate  - Assess for signs & symptoms of volume excess or deficit  Outcome: Progressing 01-May-2019

## 2021-09-04 NOTE — ASSESSMENT & PLAN NOTE
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

## 2021-09-04 NOTE — ASSESSMENT & PLAN NOTE
· Nonspecific  Abdominal ultrasound without pathology  Recheck in a m      Results from last 7 days   Lab Units 09/03/21  0859   TOTAL BILIRUBIN mg/dL 2 47*

## 2021-09-04 NOTE — ASSESSMENT & PLAN NOTE
Recurrent admission for intractable nausea/vomiting - had recent TAVR in July 2021 w/ subsequent hospitalization in early August for nausea/vomiting w/ transient resolution - now returns with complaints of recurrent nausea/vomiting and associated poor appetite over the last few weeks  ED provider discussed with on-call gastroenterologist -> tentative endoscopy this admission  C/w IV PPI regimen - c/w IV fluid hydration - monitor/replete electrolyte deficiencies if present  PRN emesis control

## 2021-09-04 NOTE — UTILIZATION REVIEW
Initial Clinical Review    Admission: Date/Time/Statement:   Admission Orders (From admission, onward)     Ordered        09/03/21 1849  Inpatient Admission  Once                   Orders Placed This Encounter   Procedures    Inpatient Admission     Standing Status:   Standing     Number of Occurrences:   1     Order Specific Question:   Level of Care     Answer:   Med Surg [16]     Order Specific Question:   Estimated length of stay     Answer:   More than 2 Midnights     Order Specific Question:   Certification     Answer:   I certify that inpatient services are medically necessary for this patient for a duration of greater than two midnights  See H&P and MD Progress Notes for additional information about the patient's course of treatment  ED Arrival Information     Expected Arrival Acuity    - 9/3/2021 08:35 Urgent         Means of arrival Escorted by Service Admission type    Wheelchair Family Member Hospitalist Urgent         Arrival complaint    nausea, abd pain, no appetite        Chief Complaint   Patient presents with    Nausea     pt reports ever since she was admitted to the hospital previously for UTI pt has been having nasuea and vomitting and is unable to eat  pt reports recent change in her medications  Initial Presentation: 80  Y O female  Presents to ED from home with recurrent nausea and vomiting  Was in house  In early  August with same symptoms,  Felt  Due to meds and possibly  Esophagitis  Now returns  With similar symptoms,  Inability to take meds or   Food  For several days  PMH  Is   aortic stenosis status post TAVR July 2021, new onset atrial fibrillation last admission here August 2021, hypertension, and Parkinson's disease  Labs  Reveal elevated bilirubin     Admit  Ip with  Intractable  Nausea and vomiting, hyperbilirubinemia and PAF and plan is  Abdominal U/S, monitor labs, possible  Endoscopy, IVF, PPI, continue home meds  And  Diet trial          Date:    9/4 Day 2:   Still with intermittent episodes of nausea  Complains of weakness/fatigue  T bili  2 14  Today  Continue PT/OT  Continue IVF and anti emetics as  Needed  ED Triage Vitals   Temperature Pulse Respirations Blood Pressure SpO2   09/03/21 0848 09/03/21 0848 09/03/21 0848 09/03/21 0848 09/03/21 0848   97 9 °F (36 6 °C) 69 18 161/75 98 %      Temp Source Heart Rate Source Patient Position - Orthostatic VS BP Location FiO2 (%)   09/03/21 0848 09/03/21 0900 09/03/21 0900 09/03/21 0900 --   Oral Monitor Lying Right arm       Pain Score       09/03/21 1421       4          Wt Readings from Last 1 Encounters:   09/03/21 44 7 kg (98 lb 8 7 oz)     Additional Vital Signs:     --  --  --  --  --  None (Room air)  --     09/04/21 07:01:54  98 2 °F (36 8 °C)  71  20  128/63  85  97 %  --  --   09/03/21 21:53:24  97 9 °F (36 6 °C)  75  17  142/67  92  98 %  None (Room air)  --   09/03/21 19:34:36  98 °F (36 7 °C)  71  18  152/71  98  97 %  None (Room air)  Lying   09/03/21 1800  --  72  18  110/64  82  100 %  None (Room air)  --   09/03/21 1530  --  64  18  125/61  88  98 %  None (Room air)  Lying   09/03/21 1500  --  67  18  129/60  87  99 %  None (Room air)  Lying   09/03/21 1400  --  65  18  148/67  96  91 %  None (Room air)  Lying   09/03/21 1330  --  67  18  149/78  108  99 %  None (Room air)  Lying   09/03/21 1300  --  68  18  168/70  100  100 %  None (Room air)  Lying   09/03/21 1030  --  61  18  131/62  89  100 %  None (Room air)  --   09/03/21 1000  --  66  18  140/68  98  100 %  None (Room air)  --   09/03/21 0930  --  64  18  144/64  92  100 %  None (Room air)  Lying   09/03/21 0900  --  69  18  148/70  104  100 %  None (Room air)  Lying   09/03/21 0848  97 9 °F (36 6 °C)  69  18  161/75  --  98 %  --         Pertinent Labs/Diagnostic Test Results:   U/S   RUQ ( 9/3)      Gallbladder sludge   Otherwise, no evidence of biliary ductal dilatation  Hepatic and right renal cysts         Results from last 7 days   Lab Units 09/04/21  0600 09/03/21  0859   WBC Thousand/uL 3 32* 3 52*   HEMOGLOBIN g/dL 8 1* 10 0*   HEMATOCRIT % 24 9* 31 0*   PLATELETS Thousands/uL 92* 107*   NEUTROS ABS Thousands/µL  --  2 52         Results from last 7 days   Lab Units 09/04/21  0600 09/03/21  0859   SODIUM mmol/L 138 135*   POTASSIUM mmol/L 4 2 4 0   CHLORIDE mmol/L 103 99*   CO2 mmol/L 24 28   ANION GAP mmol/L 11 8   BUN mg/dL 15 18   CREATININE mg/dL 0 77 0 98   EGFR ml/min/1 73sq m 70 52   CALCIUM mg/dL 8 0* 8 6     Results from last 7 days   Lab Units 09/04/21  0600 09/03/21  0859   AST U/L 55* 59*   ALT U/L 7* 8*   ALK PHOS U/L 92 104   TOTAL PROTEIN g/dL 5 4* 6 7   ALBUMIN g/dL 3 2* 4 0   TOTAL BILIRUBIN mg/dL 2 14* 2 47*         Results from last 7 days   Lab Units 09/04/21  0600 09/03/21  0859   GLUCOSE RANDOM mg/dL 68 83           Results from last 7 days   Lab Units 09/03/21  0859   LACTIC ACID mmol/L 1 2               Results from last 7 days   Lab Units 09/03/21  0859   LIPASE u/L 154         ED Treatment:   Medication Administration from 09/03/2021 0834 to 09/03/2021 1932       Date/Time Order Dose Route Action Comments     09/03/2021 1533 sodium chloride 0 9 % bolus 1,000 mL 0 mL Intravenous Stopped      09/03/2021 1000 sodium chloride 0 9 % bolus 1,000 mL 1,000 mL Intravenous New Bag      09/03/2021 0952 ondansetron (ZOFRAN) injection 4 mg 4 mg Intravenous Given      09/03/2021 0955 pantoprazole (PROTONIX) injection 40 mg 40 mg Intravenous Given      09/03/2021 1533 lactated ringers infusion 0 mL/hr Intravenous Stopped      09/03/2021 0955 lactated ringers infusion 175 mL/hr Intravenous New Bag      09/03/2021 1421 metoclopramide (REGLAN) injection 10 mg 10 mg Intravenous Given      09/03/2021 1421 acetaminophen (TYLENOL) tablet 650 mg 650 mg Oral Given         Present on Admission:   Diastolic congestive heart failure (HCC)   Paroxysmal atrial fibrillation (HCC)   Hiatal hernia   Constipation   [facility]   Intractable nausea and vomiting   Hyperbilirubinemia      Admitting Diagnosis: Gastritis [K29 70]  Nausea [R11 0]  Nausea & vomiting [R11 2]  Age/Sex: 80 y o  female  Admission Orders:  Scheduled Medications:  aspirin, 81 mg, Oral, Daily  atorvastatin, 80 mg, Oral, Daily  carbidopa-levodopa, 1 tablet, Oral, 5x Daily  enoxaparin, 40 mg, Subcutaneous, Daily  metoprolol tartrate, 25 mg, Oral, Q12H SWATI  pantoprazole, 40 mg, Intravenous, Q12H SWATI  polyethylene glycol, 17 g, Oral, Daily  Safinamide Mesylate, 100 mg, Oral, Daily  senna-docusate sodium, 1 tablet, Oral, HS      Continuous IV Infusions:  lactated ringers, 100 mL/hr, Intravenous, Continuous      PRN Meds:  acetaminophen, 650 mg, Oral, Q6H PRN  ALPRAZolam, 0 25 mg, Oral, TID PRN  metoclopramide, 10 mg, Intravenous, Q6H PRN  ondansetron, 4 mg, Intravenous, Q4H PRN  ( x1  9/3 and  X 2  9/4 thus far)  pneumococcal 13-valent conjugate vaccine, 0 5 mL, Intramuscular, Prior to discharge        IP CONSULT TO GASTROENTEROLOGY    Network Utilization Review Department  ATTENTION: Please call with any questions or concerns to [phone] and carefully listen to the prompts so that you are directed to the right person  All voicemails are confidential   Kristen Queen all requests for admission clinical reviews, approved or denied determinations and any other requests to dedicated fax number below belonging to the campus where the patient is receiving treatment   List of dedicated fax numbers for the Facilities:  [address] DENIALS (Administrative/Medical Necessity) [phone]   [address] (Maternity/NICU/Pediatrics) 261 [facility] [address] Roosevelt Moran [id number] [facility] Pato Beatty [address] [phone]

## 2021-09-04 NOTE — ASSESSMENT & PLAN NOTE
Rate controlled on Lopressor - c/w ASA - not on chronic anticoagulation - outpatient follow-up with cardiology

## 2021-09-04 NOTE — PLAN OF CARE
Problem: Potential for Falls  Goal: Patient will remain free of falls  Description: INTERVENTIONS:  - Educate patient/family on patient safety including physical limitations  - Instruct patient to call for assistance with activity   - Consult OT/PT to assist with strengthening/mobility   - Keep Call bell within reach  - Keep bed low and locked with side rails adjusted as appropriate  - Keep care items and personal belongings within reach  - Initiate and maintain comfort rounds  - Make Fall Risk Sign visible to staff  - Offer Toileting every 2 Hours, in advance of need  - Apply yellow socks and bracelet for high fall risk patients  - Consider moving patient to room near nurses station  Outcome: Progressing     Problem: MOBILITY - ADULT  Goal: Maintain or return to baseline ADL function  Description: INTERVENTIONS:  -  Assess patient's ability to carry out ADLs; assess patient's baseline for ADL function and identify physical deficits which impact ability to perform ADLs (bathing, care of mouth/teeth, toileting, grooming, dressing, etc )  - Assess/evaluate cause of self-care deficits   - Assess range of motion  - Assess patient's mobility; develop plan if impaired  - Assess patient's need for assistive devices and provide as appropriate  - Encourage maximum independence but intervene and supervise when necessary  - Involve family in performance of ADLs  - Assess for home care needs following discharge   - Consider OT consult to assist with ADL evaluation and planning for discharge  - Provide patient education as appropriate  Outcome: Progressing  Goal: Maintains/Returns to pre admission functional level  Description: INTERVENTIONS:  - Perform BMAT or MOVE assessment daily    - Set and communicate daily mobility goal to care team and patient/family/caregiver     - Collaborate with rehabilitation services on mobility goals if consulted  - Out of bed for meals 3 times a day  - Out of bed for toileting  - Record patient progress and toleration of activity level   Outcome: Progressing     Problem: Nutrition/Hydration-ADULT  Goal: Nutrient/Hydration intake appropriate for improving, restoring or maintaining nutritional needs  Description: Monitor and assess patient's nutrition/hydration status for malnutrition  Collaborate with interdisciplinary team and initiate plan and interventions as ordered  Monitor patient's weight and dietary intake as ordered or per policy  Utilize nutrition screening tool and intervene as necessary  Determine patient's food preferences and provide high-protein, high-caloric foods as appropriate       INTERVENTIONS:  - Monitor oral intake, urinary output, labs, and treatment plans  - Assess nutrition and hydration status and recommend course of action  - Evaluate amount of meals eaten  - Assist patient with eating if necessary   - Allow adequate time for meals  - Recommend/ encourage appropriate diets, oral nutritional supplements, and vitamin/mineral supplements  - Order, calculate, and assess calorie counts as needed  - Recommend, monitor, and adjust tube feedings and TPN/PPN based on assessed needs  - Assess need for intravenous fluids  - Provide specific nutrition/hydration education as appropriate  - Include patient/family/caregiver in decisions related to nutrition  Outcome: Progressing     Problem: PAIN - ADULT  Goal: Verbalizes/displays adequate comfort level or baseline comfort level  Description: Interventions:  - Encourage patient to monitor pain and request assistance  - Assess pain using appropriate pain scale  - Administer analgesics based on type and severity of pain and evaluate response  - Implement non-pharmacological measures as appropriate and evaluate response  - Consider cultural and social influences on pain and pain management  - Notify physician/advanced practitioner if interventions unsuccessful or patient reports new pain  Outcome: Progressing     Problem: INFECTION - ADULT  Goal: Absence or prevention of progression during hospitalization  Description: INTERVENTIONS:  - Assess and monitor for signs and symptoms of infection  - Monitor lab/diagnostic results  - Monitor all insertion sites, i e  indwelling lines, tubes, and drains  - Monitor endotracheal if appropriate and nasal secretions for changes in amount and color  - [place] appropriate cooling/warming therapies per order  - Administer medications as ordered  - Instruct and encourage patient and family to use good hand hygiene technique  - Identify and instruct in appropriate isolation precautions for identified infection/condition  Outcome: Progressing  Goal: Absence of fever/infection during neutropenic period  Description: INTERVENTIONS:  - Monitor WBC    Outcome: Progressing     Problem: SAFETY ADULT  Goal: Patient will remain free of falls  Description: INTERVENTIONS:  - Educate patient/family on patient safety including physical limitations  - Instruct patient to call for assistance with activity   - Consult OT/PT to assist with strengthening/mobility   - Keep Call bell within reach  - Keep bed low and locked with side rails adjusted as appropriate  - Keep care items and personal belongings within reach  - Initiate and maintain comfort rounds  - Make Fall Risk Sign visible to staff  - Offer Toileting every 2 Hours, in advance of need  - Apply yellow socks and bracelet for high fall risk patients  - Consider moving patient to room near nurses station  Outcome: Progressing  Goal: Maintain or return to baseline ADL function  Description: INTERVENTIONS:  -  Assess patient's ability to carry out ADLs; assess patient's baseline for ADL function and identify physical deficits which impact ability to perform ADLs (bathing, care of mouth/teeth, toileting, grooming, dressing, etc )  - Assess/evaluate cause of self-care deficits   - Assess range of motion  - Assess patient's mobility; develop plan if impaired  - Assess patient's need for assistive devices and provide as appropriate  - Encourage maximum independence but intervene and supervise when necessary  - Involve family in performance of ADLs  - Assess for home care needs following discharge   - Consider OT consult to assist with ADL evaluation and planning for discharge  - Provide patient education as appropriate  Outcome: Progressing  Goal: Maintains/Returns to pre admission functional level  Description: INTERVENTIONS:  - Perform BMAT or MOVE assessment daily    - Set and communicate daily mobility goal to care team and patient/family/caregiver  - Collaborate with rehabilitation services on mobility goals if consulted  - Out of bed for toileting  - Record patient progress and toleration of activity level   Outcome: Progressing     Problem: DISCHARGE PLANNING  Goal: Discharge to home or other facility with appropriate resources  Description: INTERVENTIONS:  - Identify barriers to discharge w/patient and caregiver  - Arrange for needed discharge resources and transportation as appropriate  - Identify discharge learning needs (meds, wound care, etc )  - Arrange for interpretive services to assist at discharge as needed  - Refer to Case Management Department for coordinating discharge planning if the patient needs post-hospital services based on physician/advanced practitioner order or complex needs related to functional status, cognitive ability, or social support system  Outcome: Progressing     Problem: Knowledge Deficit  Goal: Patient/family/caregiver demonstrates understanding of disease process, treatment plan, medications, and discharge instructions  Description: Complete learning assessment and assess knowledge base    Interventions:  - Provide teaching at level of understanding  - Provide teaching via preferred learning methods  Outcome: Progressing     Problem: GASTROINTESTINAL - ADULT  Goal: Minimal or absence of nausea and/or vomiting  Description: INTERVENTIONS:  - Administer IV fluids if ordered to ensure adequate hydration  - Maintain NPO status until nausea and vomiting are resolved  - Nasogastric tube if ordered  - Administer ordered antiemetic medications as needed  - Provide nonpharmacologic comfort measures as appropriate  - Advance diet as tolerated, if ordered  - Consider nutrition services referral to assist patient with adequate nutrition and appropriate food choices  Outcome: Progressing  Goal: Maintains adequate nutritional intake  Description: INTERVENTIONS:  - Monitor percentage of each meal consumed  - Identify factors contributing to decreased intake, treat as appropriate  - Assist with meals as needed  - Monitor I&O, weight, and lab values if indicated  - Obtain nutrition services referral as needed  Outcome: Progressing     Problem: METABOLIC, FLUID AND ELECTROLYTES - ADULT  Goal: Electrolytes maintained within normal limits  Description: INTERVENTIONS:  - Monitor labs and assess patient for signs and symptoms of electrolyte imbalances  - Administer electrolyte replacement as ordered  - Monitor response to electrolyte replacements, including repeat lab results as appropriate  - Instruct patient on fluid and nutrition as appropriate  Outcome: Progressing  Goal: Fluid balance maintained  Description: INTERVENTIONS:  - Monitor labs   - Monitor I/O and WT  - Instruct patient on fluid and nutrition as appropriate  - Assess for signs & symptoms of volume excess or deficit  Outcome: Progressing

## 2021-09-04 NOTE — ASSESSMENT & PLAN NOTE
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

## 2021-09-05 LAB
ABO GROUP BLD: NORMAL
ALBUMIN SERPL BCP-MCNC: 2.8 G/DL (ref 3.5–5)
ALP SERPL-CCNC: 76 U/L (ref 46–116)
ALT SERPL W P-5'-P-CCNC: 7 U/L (ref 12–78)
ANION GAP SERPL CALCULATED.3IONS-SCNC: 6 MMOL/L (ref 4–13)
AST SERPL W P-5'-P-CCNC: 42 U/L (ref 5–45)
BILIRUB DIRECT SERPL-MCNC: 0.39 MG/DL (ref 0–0.2)
BILIRUB SERPL-MCNC: 1.58 MG/DL (ref 0.2–1)
BLD GP AB SCN SERPL QL: NEGATIVE
BUN SERPL-MCNC: 15 MG/DL (ref 5–25)
CALCIUM ALBUM COR SERPL-MCNC: 8.9 MG/DL (ref 8.3–10.1)
CALCIUM SERPL-MCNC: 7.9 MG/DL (ref 8.3–10.1)
CHLORIDE SERPL-SCNC: 104 MMOL/L (ref 100–108)
CO2 SERPL-SCNC: 28 MMOL/L (ref 21–32)
CREAT SERPL-MCNC: 0.68 MG/DL (ref 0.6–1.3)
ERYTHROCYTE [DISTWIDTH] IN BLOOD BY AUTOMATED COUNT: 21.8 % (ref 11.6–15.1)
FERRITIN SERPL-MCNC: 365 NG/ML (ref 8–388)
FOLATE SERPL-MCNC: 7.2 NG/ML (ref 3.1–17.5)
GFR SERPL CREATININE-BSD FRML MDRD: 79 ML/MIN/1.73SQ M
GLUCOSE SERPL-MCNC: 71 MG/DL (ref 65–140)
HCT VFR BLD AUTO: 22.9 % (ref 34.8–46.1)
HGB BLD-MCNC: 7.2 G/DL (ref 11.5–15.4)
IRON SATN MFR SERPL: 95 %
IRON SERPL-MCNC: 152 UG/DL (ref 50–170)
MAGNESIUM SERPL-MCNC: 1.8 MG/DL (ref 1.6–2.6)
MCH RBC QN AUTO: 31.2 PG (ref 26.8–34.3)
MCHC RBC AUTO-ENTMCNC: 31.4 G/DL (ref 31.4–37.4)
MCV RBC AUTO: 99 FL (ref 82–98)
NRBC BLD AUTO-RTO: 0 /100 WBCS
PHOSPHATE SERPL-MCNC: 3.1 MG/DL (ref 2.3–4.1)
PLATELET # BLD AUTO: 84 THOUSANDS/UL (ref 149–390)
POTASSIUM SERPL-SCNC: 3.7 MMOL/L (ref 3.5–5.3)
PROT SERPL-MCNC: 4.7 G/DL (ref 6.4–8.2)
RBC # BLD AUTO: 2.31 MILLION/UL (ref 3.81–5.12)
RH BLD: POSITIVE
SODIUM SERPL-SCNC: 138 MMOL/L (ref 136–145)
SPECIMEN EXPIRATION DATE: NORMAL
TIBC SERPL-MCNC: 160 UG/DL (ref 250–450)
WBC # BLD AUTO: 2.45 THOUSAND/UL (ref 4.31–10.16)

## 2021-09-05 PROCEDURE — 30233N1 TRANSFUSION OF NONAUTOLOGOUS RED BLOOD CELLS INTO PERIPHERAL VEIN, PERCUTANEOUS APPROACH: ICD-10-PCS | Performed by: FAMILY MEDICINE

## 2021-09-05 PROCEDURE — P9016 RBC LEUKOCYTES REDUCED: HCPCS

## 2021-09-05 PROCEDURE — 83540 ASSAY OF IRON: CPT | Performed by: FAMILY MEDICINE

## 2021-09-05 PROCEDURE — 85027 COMPLETE CBC AUTOMATED: CPT | Performed by: INTERNAL MEDICINE

## 2021-09-05 PROCEDURE — 86901 BLOOD TYPING SEROLOGIC RH(D): CPT | Performed by: FAMILY MEDICINE

## 2021-09-05 PROCEDURE — 84100 ASSAY OF PHOSPHORUS: CPT | Performed by: INTERNAL MEDICINE

## 2021-09-05 PROCEDURE — 86900 BLOOD TYPING SEROLOGIC ABO: CPT | Performed by: FAMILY MEDICINE

## 2021-09-05 PROCEDURE — 83735 ASSAY OF MAGNESIUM: CPT | Performed by: INTERNAL MEDICINE

## 2021-09-05 PROCEDURE — 99232 SBSQ HOSP IP/OBS MODERATE 35: CPT | Performed by: FAMILY MEDICINE

## 2021-09-05 PROCEDURE — 83550 IRON BINDING TEST: CPT | Performed by: FAMILY MEDICINE

## 2021-09-05 PROCEDURE — C9113 INJ PANTOPRAZOLE SODIUM, VIA: HCPCS | Performed by: INTERNAL MEDICINE

## 2021-09-05 PROCEDURE — 82728 ASSAY OF FERRITIN: CPT | Performed by: FAMILY MEDICINE

## 2021-09-05 PROCEDURE — 86850 RBC ANTIBODY SCREEN: CPT | Performed by: FAMILY MEDICINE

## 2021-09-05 PROCEDURE — 80053 COMPREHEN METABOLIC PANEL: CPT | Performed by: INTERNAL MEDICINE

## 2021-09-05 PROCEDURE — 86920 COMPATIBILITY TEST SPIN: CPT

## 2021-09-05 PROCEDURE — 82746 ASSAY OF FOLIC ACID SERUM: CPT | Performed by: FAMILY MEDICINE

## 2021-09-05 PROCEDURE — 82248 BILIRUBIN DIRECT: CPT | Performed by: INTERNAL MEDICINE

## 2021-09-05 RX ORDER — XYLITOL/YERBA SANTA
5 AEROSOL, SPRAY WITH PUMP (ML) MUCOUS MEMBRANE 4 TIMES DAILY PRN
Status: DISCONTINUED | OUTPATIENT
Start: 2021-09-05 | End: 2021-09-09 | Stop reason: HOSPADM

## 2021-09-05 RX ADMIN — ENOXAPARIN SODIUM 40 MG: 40 INJECTION SUBCUTANEOUS at 08:37

## 2021-09-05 RX ADMIN — METOPROLOL TARTRATE 25 MG: 25 TABLET, FILM COATED ORAL at 08:37

## 2021-09-05 RX ADMIN — ALPRAZOLAM 0.25 MG: 0.25 TABLET ORAL at 16:45

## 2021-09-05 RX ADMIN — ATORVASTATIN CALCIUM 80 MG: 40 TABLET, FILM COATED ORAL at 08:37

## 2021-09-05 RX ADMIN — POLYETHYLENE GLYCOL 3350 17 G: 17 POWDER, FOR SOLUTION ORAL at 08:37

## 2021-09-05 RX ADMIN — CARBIDOPA AND LEVODOPA 1 TABLET: 50; 200 TABLET, EXTENDED RELEASE ORAL at 15:04

## 2021-09-05 RX ADMIN — ALPRAZOLAM 0.25 MG: 0.25 TABLET ORAL at 21:20

## 2021-09-05 RX ADMIN — ONDANSETRON 4 MG: 2 INJECTION INTRAMUSCULAR; INTRAVENOUS at 15:10

## 2021-09-05 RX ADMIN — CARBIDOPA AND LEVODOPA 1 TABLET: 50; 200 TABLET, EXTENDED RELEASE ORAL at 06:14

## 2021-09-05 RX ADMIN — SAFINAMIDE MESYLATE 100 MG: 100 TABLET, FILM COATED ORAL at 08:38

## 2021-09-05 RX ADMIN — CARBIDOPA AND LEVODOPA 1 TABLET: 50; 200 TABLET, EXTENDED RELEASE ORAL at 18:22

## 2021-09-05 RX ADMIN — CARBIDOPA AND LEVODOPA 1 TABLET: 50; 200 TABLET, EXTENDED RELEASE ORAL at 10:37

## 2021-09-05 RX ADMIN — DOCUSATE SODIUM AND SENNOSIDES 1 TABLET: 8.6; 5 TABLET ORAL at 21:20

## 2021-09-05 RX ADMIN — PANTOPRAZOLE SODIUM 40 MG: 40 INJECTION, POWDER, FOR SOLUTION INTRAVENOUS at 08:37

## 2021-09-05 RX ADMIN — METOPROLOL TARTRATE 25 MG: 25 TABLET, FILM COATED ORAL at 21:20

## 2021-09-05 RX ADMIN — ASPIRIN 81 MG: 81 TABLET, COATED ORAL at 08:37

## 2021-09-05 RX ADMIN — SODIUM CHLORIDE, SODIUM LACTATE, POTASSIUM CHLORIDE, AND CALCIUM CHLORIDE 100 ML/HR: .6; .31; .03; .02 INJECTION, SOLUTION INTRAVENOUS at 06:13

## 2021-09-05 RX ADMIN — METOCLOPRAMIDE HYDROCHLORIDE 10 MG: 5 INJECTION INTRAMUSCULAR; INTRAVENOUS at 16:41

## 2021-09-05 RX ADMIN — PANTOPRAZOLE SODIUM 40 MG: 40 INJECTION, POWDER, FOR SOLUTION INTRAVENOUS at 21:19

## 2021-09-05 NOTE — PROGRESS NOTES
[address]  Progress Note - Mary Carbone 1934, 80 y o  female MRN: [id number]  Unit/Bed#: -01 Encounter: [id number]  Primary Care Provider: Liliya Hill   Date and time admitted to hospital: 9/3/2021  8:43 AM    * Intractable nausea and vomiting  Assessment & Plan  Recurrent admission for intractable nausea/vomiting - had recent TAVR in July 2021 w/ subsequent hospitalization in early August for nausea/vomiting w/ transient resolution - now returns with complaints of recurrent nausea/vomiting and associated poor appetite over the last few weeks  ED provider discussed with on-call gastroenterologist -> tentative endoscopy on tuesday  C/w IV PPI regimen - c/w IV fluid hydration - monitor/replete electrolyte deficiencies if present  PRN emesis control  Continues to complain of nausea and poor oral intake    Pancytopenia  Assessment & Plan  Monitor CBC and transfuse as necessary  Today hemoglobin is down to 7 2    Will transfuse 1 unit of PRBC and check iron panel and stool occult    Hyperbilirubinemia  Assessment & Plan  Nonspecific - adominal ultrasound without pathology - serum bilirubin level downtrending on IV fluids    Results from last 7 days   Lab Units 09/05/21  0503 09/04/21  0600 09/03/21  0859   TOTAL BILIRUBIN mg/dL 1 58* 2 14* 2 47*       Paroxysmal atrial fibrillation   Assessment & Plan  Rate controlled on Lopressor - c/w ASA - not on chronic anticoagulation - outpatient follow-up with cardiology    Chronic diastolic CHF  Assessment & Plan  Continue beta-blockade with Lopressor - not chronically on diuretics  Echocardiogram @ [address] in June 2021 w/ an EF of 60-65%    Severe protein-calorie malnutrition  Assessment & Plan  BMI of 16 4 in the setting of decreased appetite/oral intake and evidence of muscle wasting/atrophy on exam  Initiated nutritional supplementation with meals    Parkinson's disease   Assessment & Plan  C/w Sinemet/Xadago  PT/OT as tolerated    History of transcatheter aortic valve replacement (TAVR)  Assessment & Plan  History of TAVR @ [address] recently on 734 with 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  Saw CT surgery on 21 w/ recommendation to stop Plavix due to patient complaints of GI upset  Serial outpatient followups      VTE Pharmacologic Prophylaxis:   Pharmacologic: Pharmacologic VTE Prophylaxis contraindicated due to Acute on chronic anemia  Mechanical VTE Prophylaxis in Place: Yes    Patient Centered Rounds: I have performed bedside rounds with nursing staff today  Discussions with Specialists or Other Care Team Provider:  None    Education and Discussions with Family / Patient:  Discussed with patient at bedside will update family    Time Spent for Care: 30 minutes  More than 50% of total time spent on counseling and coordination of care as described above  Current Length of Stay: 2 day(s)    Current Patient Status: Inpatient   Certification Statement: The patient will continue to require additional inpatient hospital stay due to Intractable nausea    Discharge Plan:  Pending progress    Code Status: Level 3 - DNAR and DNI      Subjective:   Patient complains of poor oral intake because of persistent nausea  Patient states it is no better and still complains of the same  Denies any constipation  Denies any evidence of bleeding    Objective:     Vitals:   Temp (24hrs), Av 8 °F (36 6 °C), Min:97 4 °F (36 3 °C), Max:98 3 °F (36 8 °C)    Temp:  [97 4 °F (36 3 °C)-98 3 °F (36 8 °C)] 98 3 °F (36 8 °C)  HR:  [60-74] 74  Resp:  [16-19] 19  BP: (118-123)/(61-69) 118/69  SpO2:  [96 %-98 %] 97 %  Body mass index is 16 4 kg/m²  Input and Output Summary (last 24 hours):        Intake/Output Summary (Last 24 hours) at 2021 1220  Last data filed at 2021 1210  Gross per 24 hour   Intake 2640 ml   Output 1250 ml   Net 1390 ml       Physical Exam:     Physical Exam  Vitals and nursing note reviewed  Constitutional:       Appearance: She is ill-appearing  Comments: Severe generalized muscle wasting noted   HENT:      Head: Normocephalic and atraumatic  Right Ear: External ear normal       Left Ear: External ear normal       Nose: Nose normal       Mouth/Throat:      Pharynx: Oropharynx is clear  Cardiovascular:      Rate and Rhythm: Normal rate and regular rhythm  Heart sounds: Normal heart sounds  Pulmonary:      Effort: Pulmonary effort is normal       Breath sounds: Normal breath sounds  Abdominal:      General: Bowel sounds are normal  There is distension  Palpations: Abdomen is soft  Tenderness: There is no abdominal tenderness  Musculoskeletal:         General: Normal range of motion  Cervical back: Normal range of motion and neck supple  Skin:     General: Skin is warm and dry  Capillary Refill: Capillary refill takes less than 2 seconds  Neurological:      General: No focal deficit present  Mental Status: She is alert and oriented to person, place, and time  Psychiatric:         Mood and Affect: Mood normal            Additional Data:     Labs:    Results from last 7 days   Lab Units 09/05/21  0503 09/03/21  0859   WBC Thousand/uL 2 45* 3 52*   HEMOGLOBIN g/dL 7 2* 10 0*   HEMATOCRIT % 22 9* 31 0*   PLATELETS Thousands/uL 84* 107*   NEUTROS PCT %  --  72   LYMPHS PCT %  --  16   MONOS PCT %  --  9   EOS PCT %  --  1     Results from last 7 days   Lab Units 09/05/21  0503   SODIUM mmol/L 138   POTASSIUM mmol/L 3 7   CHLORIDE mmol/L 104   CO2 mmol/L 28   BUN mg/dL 15   CREATININE mg/dL 0 68   ANION GAP mmol/L 6   CALCIUM mg/dL 7 9*   ALBUMIN g/dL 2 8*   TOTAL BILIRUBIN mg/dL 1 58*   ALK PHOS U/L 76   ALT U/L 7*   AST U/L 42   GLUCOSE RANDOM mg/dL 71                 Results from last 7 days   Lab Units 09/03/21  0859   LACTIC ACID mmol/L 1 2           * I Have Reviewed All Lab Data Listed Above    * Additional Pertinent Lab Tests Reviewed: [facility] Admission Reviewed    Imaging:    Imaging Reports Reviewed Today Include:  CT abdomen pelvis  Imaging Personally Reviewed by Myself Includes:  CT abdomen pelvis    Recent Cultures (last 7 days):           Last 24 Hours Medication List:   Current Facility-Administered Medications   Medication Dose Route Frequency Provider Last Rate    acetaminophen  650 mg Oral Q6H PRN [facility], [facility]      ALPRAZolam  0 25 mg Oral TID PRN Jerri Gomez PA-C      aspirin  81 mg Oral Daily [facility], DO      atorvastatin  80 mg Oral Daily [facility], DO      carbidopa-levodopa  1 tablet Oral 5x Daily [facility], DO      enoxaparin  40 mg Subcutaneous Daily [facility], DO      lactated ringers  100 mL/hr Intravenous Continuous Chace Gibson  mL/hr (09/05/21 5028)    metoclopramide  10 mg Intravenous Q6H PRN [facility], DO      metoprolol tartrate  25 mg Oral Q12H [facility], DO      ondansetron  4 mg Intravenous Q4H PRN [facility], DO      pantoprazole  40 mg Intravenous Q12H [facility], DO      pneumococcal 13-valent conjugate vaccine  0 5 mL Intramuscular Prior to discharge Kaila Gomez PA-C      polyethylene glycol  17 g Oral Daily [facility], DO      Safinamide Mesylate  100 mg Oral Daily [facility] Ou, DO      senna-docusate sodium  1 tablet Oral HS [facility] Ou, DO          Today, Patient Was Seen By: Juan Espino MD    ** Please Note: Dictation voice to text software may have been used in the creation of this document   **

## 2021-09-05 NOTE — ASSESSMENT & PLAN NOTE
Continue beta-blockade with Lopressor - not chronically on diuretics  Echocardiogram @ [address] in June 2021 w/ an EF of 60-65%

## 2021-09-05 NOTE — ASSESSMENT & PLAN NOTE
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

## 2021-09-05 NOTE — NURSING NOTE
Patient continues with nausea, despite having the zofran earlier  She has not been able to eat anything all day  Reglan given  She is feeling anxious, so I also gave her the xanax

## 2021-09-05 NOTE — PLAN OF CARE
Problem: Potential for Falls  Goal: Patient will remain free of falls  Description: INTERVENTIONS:  - Educate patient/family on patient safety including physical limitations  - Instruct patient to call for assistance with activity   - Consult OT/PT to assist with strengthening/mobility   - Keep Call bell within reach  - Keep bed low and locked with side rails adjusted as appropriate  - Keep care items and personal belongings within reach  - Initiate and maintain comfort rounds    - Apply yellow socks and bracelet for high fall risk patients  - Consider moving patient to room near nurses station  Outcome: Progressing     Problem: MOBILITY - ADULT  Goal: Maintain or return to baseline ADL function  Description: INTERVENTIONS:  -  Assess patient's ability to carry out ADLs; assess patient's baseline for ADL function and identify physical deficits which impact ability to perform ADLs (bathing, care of mouth/teeth, toileting, grooming, dressing, etc )  - Assess/evaluate cause of self-care deficits   - Assess range of motion  - Assess patient's mobility; develop plan if impaired  - Assess patient's need for assistive devices and provide as appropriate  - Encourage maximum independence but intervene and supervise when necessary  - Involve family in performance of ADLs  - Assess for home care needs following discharge   - Consider OT consult to assist with ADL evaluation and planning for discharge  - Provide patient education as appropriate  Outcome: Progressing  Goal: Maintains/Returns to pre admission functional level  Description: INTERVENTIONS:  - Perform BMAT or MOVE assessment daily    - Set and communicate daily mobility goal to care team and patient/family/caregiver     - Collaborate with rehabilitation services on mobility goals if consulted    - Out of bed for toileting  - Record patient progress and toleration of activity level   Outcome: Progressing     Problem: Nutrition/Hydration-ADULT  Goal: Nutrient/Hydration intake appropriate for improving, restoring or maintaining nutritional needs  Description: Monitor and assess patient's nutrition/hydration status for malnutrition  Collaborate with interdisciplinary team and initiate plan and interventions as ordered  Monitor patient's weight and dietary intake as ordered or per policy  Utilize nutrition screening tool and intervene as necessary  Determine patient's food preferences and provide high-protein, high-caloric foods as appropriate       INTERVENTIONS:  - Monitor oral intake, urinary output, labs, and treatment plans  - Assess nutrition and hydration status and recommend course of action  - Evaluate amount of meals eaten  - Assist patient with eating if necessary   - Allow adequate time for meals  - Recommend/ encourage appropriate diets, oral nutritional supplements, and vitamin/mineral supplements  - Order, calculate, and assess calorie counts as needed  - Recommend, monitor, and adjust tube feedings and TPN/PPN based on assessed needs  - Assess need for intravenous fluids  - Provide specific nutrition/hydration education as appropriate  - Include patient/family/caregiver in decisions related to nutrition  Outcome: Progressing     Problem: PAIN - ADULT  Goal: Verbalizes/displays adequate comfort level or baseline comfort level  Description: Interventions:  - Encourage patient to monitor pain and request assistance  - Assess pain using appropriate pain scale  - Administer analgesics based on type and severity of pain and evaluate response  - Implement non-pharmacological measures as appropriate and evaluate response  - Consider cultural and social influences on pain and pain management  - Notify physician/advanced practitioner if interventions unsuccessful or patient reports new pain  Outcome: Progressing     Problem: INFECTION - ADULT  Goal: Absence or prevention of progression during hospitalization  Description: INTERVENTIONS:  - Assess and monitor for signs and symptoms of infection  - Monitor lab/diagnostic results  - Monitor all insertion sites, i e  indwelling lines, tubes, and drains  - Monitor endotracheal if appropriate and nasal secretions for changes in amount and color  - [facility] appropriate cooling/warming therapies per order  - Administer medications as ordered  - Instruct and encourage patient and family to use good hand hygiene technique  - Identify and instruct in appropriate isolation precautions for identified infection/condition  Outcome: Progressing     Problem: SAFETY ADULT  Goal: Patient will remain free of falls  Description: INTERVENTIONS:  - Educate patient/family on patient safety including physical limitations  - Instruct patient to call for assistance with activity   - Consult OT/PT to assist with strengthening/mobility   - Keep Call bell within reach  - Keep bed low and locked with side rails adjusted as appropriate  - Keep care items and personal belongings within reach  - Initiate and maintain comfort rounds  - Make Fall Risk Sign visible to staff  -  - Apply yellow socks and bracelet for high fall risk patients  - Consider moving patient to room near nurses station  Outcome: Progressing  Goal: Maintain or return to baseline ADL function  Description: INTERVENTIONS:  -  Assess patient's ability to carry out ADLs; assess patient's baseline for ADL function and identify physical deficits which impact ability to perform ADLs (bathing, care of mouth/teeth, toileting, grooming, dressing, etc )  - Assess/evaluate cause of self-care deficits   - Assess range of motion  - Assess patient's mobility; develop plan if impaired  - Assess patient's need for assistive devices and provide as appropriate  - Encourage maximum independence but intervene and supervise when necessary  - Involve family in performance of ADLs  - Assess for home care needs following discharge   - Consider OT consult to assist with ADL evaluation and planning for discharge  - Provide patient education as appropriate  Outcome: Progressing  Goal: Maintains/Returns to pre admission functional level  Description: INTERVENTIONS:  - Perform BMAT or MOVE assessment daily    - Set and communicate daily mobility goal to care team and patient/family/caregiver  - Collaborate with rehabilitation services on mobility goals if consulted    - Out of bed for toileting  - Record patient progress and toleration of activity level   Outcome: Progressing     Problem: DISCHARGE PLANNING  Goal: Discharge to home or other facility with appropriate resources  Description: INTERVENTIONS:  - Identify barriers to discharge w/patient and caregiver  - Arrange for needed discharge resources and transportation as appropriate  - Identify discharge learning needs (meds, wound care, etc )  - Arrange for interpretive services to assist at discharge as needed  - Refer to Case Management Department for coordinating discharge planning if the patient needs post-hospital services based on physician/advanced practitioner order or complex needs related to functional status, cognitive ability, or social support system  Outcome: Progressing     Problem: Knowledge Deficit  Goal: Patient/family/caregiver demonstrates understanding of disease process, treatment plan, medications, and discharge instructions  Description: Complete learning assessment and assess knowledge base    Interventions:  - Provide teaching at level of understanding  - Provide teaching via preferred learning methods  Outcome: Progressing     Problem: GASTROINTESTINAL - ADULT  Goal: Minimal or absence of nausea and/or vomiting  Description: INTERVENTIONS:  - Administer IV fluids if ordered to ensure adequate hydration  - Maintain NPO status until nausea and vomiting are resolved  - Nasogastric tube if ordered  - Administer ordered antiemetic medications as needed  - Provide nonpharmacologic comfort measures as appropriate  - Advance diet as tolerated, if ordered  - Consider nutrition services referral to assist patient with adequate nutrition and appropriate food choices  Outcome: Progressing  Goal: Maintains adequate nutritional intake  Description: INTERVENTIONS:  - Monitor percentage of each meal consumed  - Identify factors contributing to decreased intake, treat as appropriate  - Assist with meals as needed  - Monitor I&O, weight, and lab values if indicated  - Obtain nutrition services referral as needed  Outcome: Progressing     Problem: METABOLIC, FLUID AND ELECTROLYTES - ADULT  Goal: Electrolytes maintained within normal limits  Description: INTERVENTIONS:  - Monitor labs and assess patient for signs and symptoms of electrolyte imbalances  - Administer electrolyte replacement as ordered  - Monitor response to electrolyte replacements, including repeat lab results as appropriate  - Instruct patient on fluid and nutrition as appropriate  Outcome: Progressing  Goal: Fluid balance maintained  Description: INTERVENTIONS:  - Monitor labs   - Monitor I/O and WT  - Instruct patient on fluid and nutrition as appropriate  - Assess for signs & symptoms of volume excess or deficit  Outcome: Progressing

## 2021-09-05 NOTE — ASSESSMENT & PLAN NOTE
Nonspecific - adominal ultrasound without pathology - serum bilirubin level downtrending on IV fluids    Results from last 7 days   Lab Units 09/05/21  0503 09/04/21  0600 09/03/21  0859   TOTAL BILIRUBIN mg/dL 1 58* 2 14* 2 47*

## 2021-09-05 NOTE — ASSESSMENT & PLAN NOTE
Monitor CBC and transfuse as necessary  Today hemoglobin is down to 7 2    Will transfuse 1 unit of PRBC and check iron panel and stool occult

## 2021-09-05 NOTE — ASSESSMENT & PLAN NOTE
Recurrent admission for intractable nausea/vomiting - had recent TAVR in July 2021 w/ subsequent hospitalization in early August for nausea/vomiting w/ transient resolution - now returns with complaints of recurrent nausea/vomiting and associated poor appetite over the last few weeks  ED provider discussed with on-call gastroenterologist -> tentative endoscopy on tuesday  C/w IV PPI regimen - c/w IV fluid hydration - monitor/replete electrolyte deficiencies if present  PRN emesis control  Continues to complain of nausea and poor oral intake

## 2021-09-06 LAB
ABO GROUP BLD BPU: NORMAL
ANISOCYTOSIS BLD QL SMEAR: PRESENT
ANISOCYTOSIS BLD QL SMEAR: PRESENT
ATRIAL RATE: 60 BPM
BASOPHILS # BLD MANUAL: 0 THOUSAND/UL (ref 0–0.1)
BASOPHILS NFR MAR MANUAL: 0 % (ref 0–1)
BLD SMEAR INTERP: NORMAL
BPU ID: NORMAL
CROSSMATCH: NORMAL
EOSINOPHIL # BLD MANUAL: 0 THOUSAND/UL (ref 0–0.4)
EOSINOPHIL NFR BLD MANUAL: 0 % (ref 0–6)
ERYTHROCYTE [DISTWIDTH] IN BLOOD BY AUTOMATED COUNT: 22.5 % (ref 11.6–15.1)
FOLATE SERPL-MCNC: 6.6 NG/ML (ref 3.1–17.5)
HBV CORE AB SER QL: NORMAL
HBV CORE IGM SER QL: NORMAL
HBV SURFACE AG SER QL: NORMAL
HCT VFR BLD AUTO: 28 % (ref 34.8–46.1)
HCV AB SER QL: NORMAL
HELMET CELLS BLD QL SMEAR: PRESENT
HELMET CELLS BLD QL SMEAR: PRESENT
HGB BLD-MCNC: 9.3 G/DL (ref 11.5–15.4)
HGB RETIC QN AUTO: 28.9 PG (ref 30–38.3)
HYPERCHROMIA BLD QL SMEAR: PRESENT
HYPERCHROMIA BLD QL SMEAR: PRESENT
IMM RETICS NFR: 19 % (ref 0–14)
LDH SERPL-CCNC: 1615 U/L (ref 81–234)
LYMPHOCYTES # BLD AUTO: 0.44 THOUSAND/UL (ref 0.6–4.47)
LYMPHOCYTES # BLD AUTO: 14 % (ref 14–44)
MCH RBC QN AUTO: 31.3 PG (ref 26.8–34.3)
MCHC RBC AUTO-ENTMCNC: 33.2 G/DL (ref 31.4–37.4)
MCV RBC AUTO: 94 FL (ref 82–98)
MONOCYTES # BLD AUTO: 0.31 THOUSAND/UL (ref 0–1.22)
MONOCYTES NFR BLD: 10 % (ref 4–12)
NEUTROPHILS # BLD MANUAL: 2.37 THOUSAND/UL (ref 1.85–7.62)
NEUTS SEG NFR BLD AUTO: 76 % (ref 43–75)
NT-PROBNP SERPL-MCNC: 4272 PG/ML
P AXIS: 48 DEGREES
PLATELET # BLD AUTO: 84 THOUSANDS/UL (ref 149–390)
PLATELET BLD QL SMEAR: ABNORMAL
PLATELET BLD QL SMEAR: ABNORMAL
PR INTERVAL: 130 MS
QRS AXIS: -24 DEGREES
QRSD INTERVAL: 88 MS
QT INTERVAL: 442 MS
QTC INTERVAL: 442 MS
RBC # BLD AUTO: 2.97 MILLION/UL (ref 3.81–5.12)
RETICS # AUTO: ABNORMAL 10*3/UL (ref 14097–95744)
RETICS # CALC: 4.85 % (ref 0.37–1.87)
SCHISTOCYTES BLD QL SMEAR: PRESENT
SCHISTOCYTES BLD QL SMEAR: PRESENT
T WAVE AXIS: 13 DEGREES
UNIT DISPENSE STATUS: NORMAL
UNIT PRODUCT CODE: NORMAL
UNIT PRODUCT VOLUME: 300 ML
UNIT RH: NORMAL
VENTRICULAR RATE: 60 BPM
VIT B12 SERPL-MCNC: 1669 PG/ML (ref 100–900)
WBC # BLD AUTO: 3.12 THOUSAND/UL (ref 4.31–10.16)

## 2021-09-06 PROCEDURE — 85007 BL SMEAR W/DIFF WBC COUNT: CPT | Performed by: INTERNAL MEDICINE

## 2021-09-06 PROCEDURE — 86705 HEP B CORE ANTIBODY IGM: CPT | Performed by: NURSE PRACTITIONER

## 2021-09-06 PROCEDURE — 82607 VITAMIN B-12: CPT | Performed by: NURSE PRACTITIONER

## 2021-09-06 PROCEDURE — 93005 ELECTROCARDIOGRAM TRACING: CPT

## 2021-09-06 PROCEDURE — 85046 RETICYTE/HGB CONCENTRATE: CPT | Performed by: NURSE PRACTITIONER

## 2021-09-06 PROCEDURE — 84630 ASSAY OF ZINC: CPT | Performed by: NURSE PRACTITIONER

## 2021-09-06 PROCEDURE — 82784 ASSAY IGA/IGD/IGG/IGM EACH: CPT | Performed by: NURSE PRACTITIONER

## 2021-09-06 PROCEDURE — 86704 HEP B CORE ANTIBODY TOTAL: CPT | Performed by: NURSE PRACTITIONER

## 2021-09-06 PROCEDURE — 82525 ASSAY OF COPPER: CPT | Performed by: NURSE PRACTITIONER

## 2021-09-06 PROCEDURE — 99232 SBSQ HOSP IP/OBS MODERATE 35: CPT | Performed by: NURSE PRACTITIONER

## 2021-09-06 PROCEDURE — 83010 ASSAY OF HAPTOGLOBIN QUANT: CPT | Performed by: NURSE PRACTITIONER

## 2021-09-06 PROCEDURE — 82746 ASSAY OF FOLIC ACID SERUM: CPT | Performed by: NURSE PRACTITIONER

## 2021-09-06 PROCEDURE — 97163 PT EVAL HIGH COMPLEX 45 MIN: CPT

## 2021-09-06 PROCEDURE — 85007 BL SMEAR W/DIFF WBC COUNT: CPT | Performed by: NURSE PRACTITIONER

## 2021-09-06 PROCEDURE — 86803 HEPATITIS C AB TEST: CPT | Performed by: NURSE PRACTITIONER

## 2021-09-06 PROCEDURE — 86255 FLUORESCENT ANTIBODY SCREEN: CPT | Performed by: NURSE PRACTITIONER

## 2021-09-06 PROCEDURE — 97167 OT EVAL HIGH COMPLEX 60 MIN: CPT

## 2021-09-06 PROCEDURE — 83516 IMMUNOASSAY NONANTIBODY: CPT | Performed by: NURSE PRACTITIONER

## 2021-09-06 PROCEDURE — 87340 HEPATITIS B SURFACE AG IA: CPT | Performed by: NURSE PRACTITIONER

## 2021-09-06 PROCEDURE — 83615 LACTATE (LD) (LDH) ENZYME: CPT | Performed by: NURSE PRACTITIONER

## 2021-09-06 PROCEDURE — 85027 COMPLETE CBC AUTOMATED: CPT | Performed by: INTERNAL MEDICINE

## 2021-09-06 PROCEDURE — C9113 INJ PANTOPRAZOLE SODIUM, VIA: HCPCS | Performed by: INTERNAL MEDICINE

## 2021-09-06 PROCEDURE — 83880 ASSAY OF NATRIURETIC PEPTIDE: CPT | Performed by: NURSE PRACTITIONER

## 2021-09-06 RX ADMIN — CARBIDOPA AND LEVODOPA 1 TABLET: 50; 200 TABLET, EXTENDED RELEASE ORAL at 00:10

## 2021-09-06 RX ADMIN — CARBIDOPA AND LEVODOPA 1 TABLET: 50; 200 TABLET, EXTENDED RELEASE ORAL at 18:37

## 2021-09-06 RX ADMIN — PANTOPRAZOLE SODIUM 40 MG: 40 INJECTION, POWDER, FOR SOLUTION INTRAVENOUS at 09:25

## 2021-09-06 RX ADMIN — ASPIRIN 81 MG: 81 TABLET, COATED ORAL at 09:25

## 2021-09-06 RX ADMIN — CARBIDOPA AND LEVODOPA 1 TABLET: 50; 200 TABLET, EXTENDED RELEASE ORAL at 06:20

## 2021-09-06 RX ADMIN — CARBIDOPA AND LEVODOPA 1 TABLET: 50; 200 TABLET, EXTENDED RELEASE ORAL at 09:25

## 2021-09-06 RX ADMIN — CARBIDOPA AND LEVODOPA 1 TABLET: 50; 200 TABLET, EXTENDED RELEASE ORAL at 23:42

## 2021-09-06 RX ADMIN — POLYETHYLENE GLYCOL 3350 17 G: 17 POWDER, FOR SOLUTION ORAL at 09:25

## 2021-09-06 RX ADMIN — ATORVASTATIN CALCIUM 80 MG: 40 TABLET, FILM COATED ORAL at 09:25

## 2021-09-06 RX ADMIN — METOPROLOL TARTRATE 25 MG: 25 TABLET, FILM COATED ORAL at 20:55

## 2021-09-06 RX ADMIN — METOPROLOL TARTRATE 25 MG: 25 TABLET, FILM COATED ORAL at 09:25

## 2021-09-06 RX ADMIN — METOCLOPRAMIDE HYDROCHLORIDE 10 MG: 5 INJECTION INTRAMUSCULAR; INTRAVENOUS at 14:57

## 2021-09-06 RX ADMIN — ONDANSETRON 4 MG: 2 INJECTION INTRAMUSCULAR; INTRAVENOUS at 07:59

## 2021-09-06 RX ADMIN — DOCUSATE SODIUM AND SENNOSIDES 1 TABLET: 8.6; 5 TABLET ORAL at 20:55

## 2021-09-06 RX ADMIN — CARBIDOPA AND LEVODOPA 1 TABLET: 50; 200 TABLET, EXTENDED RELEASE ORAL at 14:37

## 2021-09-06 RX ADMIN — SAFINAMIDE MESYLATE 100 MG: 100 TABLET, FILM COATED ORAL at 10:09

## 2021-09-06 RX ADMIN — PANTOPRAZOLE SODIUM 40 MG: 40 INJECTION, POWDER, FOR SOLUTION INTRAVENOUS at 20:55

## 2021-09-06 NOTE — ASSESSMENT & PLAN NOTE
Malnutrition Findings:   Adult Malnutrition type: Acute illness (related to N/V as evidenced by < 50% energy intake > 5 days, 8 4% wt loss x 1 mo (8/5/21 107lb, 9/3/21 98lb), severe fat loss to buccal pads and orbitals, severe muscle loss to temporalis, pectoralis and interroseous muscles)  Adult Degree of Malnutrition: Other severe protein calorie malnutrition (Treated with: variety of supplements, advance diet to regular, 4 gm BEATRIZ as appropriate  Recommend daily weights )    BMI Findings:  Adult BMI Classifications: Underweight < 18 5     · Being followed by nutrition  · Will trial on ensure clear and advance to regular diet as tolerated     Body mass index is 16 4 kg/m²

## 2021-09-06 NOTE — ASSESSMENT & PLAN NOTE
· Monitor CBC and transfuse as necessary  · Received 1u pRBC on 9/5 2/2 Hgb 7 2  · LDH elevated suggesting hemolysis  · Iron panel unremarkable

## 2021-09-06 NOTE — ASSESSMENT & PLAN NOTE
Wt Readings from Last 3 Encounters:   09/03/21 44 7 kg (98 lb 8 7 oz)   08/11/21 48 4 kg (106 lb 12 8 oz)       · Continue home Lopressor  · Not on chronic diuretics  · BNP elevated, will f/u with repeat in the AM  · Does not appear overtly fluid overloaded  · Echo from 6/2021 showed EF 60-65%

## 2021-09-06 NOTE — PROGRESS NOTES
Pt with 2 weeks poor PO PTA due to nausea  Continuing with poor intake at this time, 0-25% meal completions per nursing flowsheets  Reports only able to tolerate whole milk and tea at this time  During previous admission, was tolerating ensure enlive vanilla mixed with chocolate ensure  Has not yet tried the ensure clear  Will adjust ensure clear to daily, order ensure enlive vanilla with ensure compact chocolate daily and also trial ensure pudding  Adjust supplementation at follow up pending pt's preference/tolerance  Anti-nausea medications per NP  Advance diet to regular, 4 gm BEATRIZ as medically appropriate

## 2021-09-06 NOTE — PLAN OF CARE
Problem: OCCUPATIONAL THERAPY ADULT  Goal: Performs self-care activities at highest level of function for planned discharge setting  See evaluation for individualized goals  Description: Treatment Interventions: ADL retraining, Functional transfer training, UE strengthening/ROM, Endurance training, Patient/family training, Equipment evaluation/education, Compensatory technique education, Continued evaluation, Energy conservation, Activityengagement     See flowsheet documentation for full assessment, interventions and recommendations  Note: Limitation: Decreased ADL status, Decreased UE strength, Decreased endurance, Decreased self-care trans, Decreased high-level ADLs  Prognosis: Good  Assessment: Pt is a 80 y o  F, admitted to [address] [place] [address] 9/3/2021 d/t experiencing recurrent nausea and vomiting  Dx: intractable nausea and vomiting  Pt with PMHx impacting their performance during ADL tasks, including: diastolic congestive heart failure, DVT, TAVR, MI, Parkinsons disease and A-Fib  On evaluation, pt presented supine in bed and was A&Ox4  Pt reports living alone in a one-level home and performing ADL/IADL tasks @ I  Pt completing supine>sit EOB @ S  Pt's BUE MS decreased but WFL  UB ADLs @ S after set-up  LB dressing and toileting @ Steadying assist d/t safety concerns while standing  Pt able to don socks, provided set-up, while seated EOB  Pt maintaining good sitting balance while EOB  Pt completing STS from EOB with use of RW @ Steadying assist  Pt standing while maintaining fair balance with BUE supported on RW  Toilet transfer @ Steadying assist  Pt's barriers to d/c include: decrease activity tolerance, decrease standing balance, decrease performance during ADL tasks, decrease UB MS, decrease generalized strength, decrease activity engagement, decrease performance during functional transfers and limited family support   Pt would benefit from continued acute OT services to address deficits as well as post acute rehabilitation services upon d/c from hospital      OT Discharge Recommendation: Post acute rehabilitation services

## 2021-09-06 NOTE — OCCUPATIONAL THERAPY NOTE
Occupational Therapy Evaluation     Patient Name: Contreras Banks  [id number] Date: 9/6/2021  Problem List  Principal Problem:    Intractable nausea and vomiting  Active Problems:    History of transcatheter aortic valve replacement (TAVR)    Parkinson's disease     Severe protein-calorie malnutrition    Chronic diastolic CHF    Paroxysmal atrial fibrillation     Hyperbilirubinemia    Pancytopenia    Past Medical History  Past Medical History:   Diagnosis Date    Diastolic congestive heart failure (HCC)     DVT (deep venous thrombosis) (HCC)     History of transcatheter aortic valve replacement (TAVR)     MI (myocardial infarction) ([address] )     Parkinson's disease (HCC)     Paroxysmal atrial fibrillation (HCC)     UTI (urinary tract infection)      Past Surgical History  Past Surgical History:   Procedure Laterality Date    BREAST SURGERY      CARDIAC SURGERY      HYSTERECTOMY      REPLACEMENT AORTIC VALVE TRANSCATHETER (TAVR)      TUBAL LIGATION          09/06/21 1130   Note Type   Note type Evaluation   Restrictions/Precautions   Other Precautions Chair Alarm; Bed Alarm; Fall Risk   Pain Assessment   Pain Assessment Tool Pain Assessment not indicated - pt denies pain   Home Living   Type of [address] One level;Stairs to enter with rails  (2STE with rails)   Bathroom Shower/Tub Tub/shower unit   Bathroom Toilet Standard   Bathroom Equipment Grab bars in shower   [address] Walker;Cane  (rollator)   Additional Comments Pt reports living alone in a one-level home with 2 JOSHUA  No AD use at baseline  Prior Function   Level of [place] Independent with ADLs and functional mobility   Lives With Alone   Receives Help From Family   ADL Assistance Independent   IADLs Independent   Falls in the last 6 months 0   Vocational Retired   Comments Pt reports I with ADLs, IADLs and community mobility (+driving for short distances)   Pt has a cleaning lady that comes in biweekly to assist in cleaning  Children live by and can help PRN  ADL   Grooming Assistance 5  Supervision/Setup   Grooming Deficit Setup;Supervision/safety   UB Dressing Assistance 5  Supervision/Setup   UB Dressing Deficit Setup   LB Dressing Assistance   (Steadying assist)   LB Dressing Deficit Setup; Requires assistive device for steadying;Supervision/safety; Increased time to complete   Toileting Assistance    (Steadying assist)   Toileting Deficit Setup;Supervison/safety; Increased time to complete   Additional Comments UB ADLs @ S, provided set-up  LB dressing and toileting @ Steadying assist for safety concerns while standing  Pt able to complete grooming task of washing hands at sink @ S  Bed Mobility   Supine to Sit 5  Supervision   Additional items Verbal cues; Increased time required   Additional Comments Pt presented supine in bed at beginning of session  Supine to sit @ S  Pt with c/o dizziness upon sitting  Vitals were WNL  Transfers   Sit to Stand   (Steadying assist)   Additional items Verbal cues; Increased time required   Stand to Sit   (Steadying assist)   Additional items Verbal cues; Increased time required   Toilet transfer   (Steadying assist)   Additional items Verbal cues; Increased time required;Standard toilet   Additional Comments Pt able to complete STS at RW, ambulate to/from toilet and sink and sit in recliner chair with RW and Steadying assist for safety concerns     Balance   Static Sitting Good   Dynamic Sitting Fair +   Static Standing Fair   Dynamic Standing Fair -   Activity Tolerance   Activity Tolerance Patient limited by fatigue   Medical Staff Made Aware Spoke with PT, Pippa   Nurse Made Aware Spoke with RN, Vanessa CAMEJO Assessment   RUE Assessment WFL   LUE Assessment   LUE Assessment WFL   Hand Function   Gross Motor Coordination Functional   Fine Motor Coordination Functional   Sensation   Light Touch No apparent deficits   Additional Comments Pt with decreased LUE strength which pt attributes to her Parkinsons   Cognition   Overall Cognitive Status [facility]   Arousal/Participation Alert; Responsive; Cooperative   Attention Within functional limits   Orientation Level Oriented X4   Memory Within functional limits   Following Commands Follows all commands and directions without difficulty   Assessment   Limitation Decreased ADL status; Decreased UE strength;Decreased endurance;Decreased self-care trans;Decreased high-level ADLs   Prognosis Good   Assessment Pt is a 80 y o  F, admitted to [facility] 9/3/2021 d/t experiencing recurrent nausea and vomiting  Dx: intractable nausea and vomiting  Pt with PMHx impacting their performance during ADL tasks, including: diastolic congestive heart failure, DVT, TAVR, MI, Parkinsons disease and A-Fib  On evaluation, pt presented supine in bed and was A&Ox4  Pt reports living alone in a one-level home and performing ADL/IADL tasks @ I  Pt completing supine>sit EOB @ S  Pt's BUE MS decreased but WFL  UB ADLs @ S after set-up  LB dressing and toileting @ Steadying assist d/t safety concerns while standing  Pt able to don socks, provided set-up, while seated EOB  Pt maintaining good sitting balance while EOB  Pt completing STS from EOB with use of RW @ Steadying assist  Pt standing while maintaining fair balance with BUE supported on RW  Toilet transfer @ Steadying assist  Pt's barriers to d/c include: decrease activity tolerance, decrease standing balance, decrease performance during ADL tasks, decrease UB MS, decrease generalized strength, decrease activity engagement, decrease performance during functional transfers and limited family support  Pt would benefit from continued acute OT services to address deficits as well as post acute rehabilitation services upon d/c from hospital    Plan   Treatment Interventions ADL retraining;Functional transfer training;UE strengthening/ROM; Endurance training;Patient/family training;Equipment evaluation/education; Compensatory technique education;Continued evaluation; Energy conservation; Activityengagement   Goal Expiration Date 09/16/21   OT Frequency 3-5x/wk   Recommendation   OT Discharge Recommendation Post acute rehabilitation services   Additional Comments  Pt agreeable to idea of post acute rehab, as she reports she knows she has gotten significantly weaker and needs to be stronger before returning home  AM-PAC Daily Activity Inpatient   Lower Body Dressing 3   Bathing 3   Toileting 3   Upper Body Dressing 3   Grooming 3   Eating 4   Daily Activity Raw Score 19   Daily Activity Standardized Score (Calc for Raw Score >=11) 40 22   AM-[facility] Applied Cognition Inpatient   Following a Speech/Presentation 4   Understanding Ordinary Conversation 4   Taking Medications 4   Remembering Where Things Are Placed or Put Away 4   Remembering List of 4-5 Errands 3   Taking Care of Complicated Tasks 3   Applied Cognition Raw Score 22   Applied Cognition Standardized Score 47 83     The patient's raw score on the AM-PAC Daily Activity inpatient short form is 19, standardized score is 40 22, greater than 39 4  Patients at this level are likely to benefit from DC to home  Despite AM-[facility] Daily Activity inpatient short form recommendation of return to home, pt would benefit from post acute rehabilitation services d/t safety concerns, diminished endurance and strength, as well as decreased family support in-home  Pt goals to be met by 9/16/2021    1  Pt will demonstrate ability to complete grooming/hygiene tasks @ Mod I after set-up  2  Pt will demonstrate ability to complete supine<>sit @ Mod I in order to increase safety and independence during ADL tasks  3  Pt will demonstrate ability to complete UB ADLs including washing/dressing @ Mod I in order to increase performance and participation during meaningful tasks  4   Pt will demonstrate ability to complete LB dressing @ Mod I in order to increase safety and independence during meaningful tasks  5  Pt will demonstrate ability to alfredo/doff socks/shoes while sitting EOB @ Mod I in order to increase safety and independence during meaningful tasks  6  Pt will demonstrate ability to complete toileting tasks including CM and pericare @ Mod I in order to increase safety and independence during meaningful tasks  7  Pt will demonstrate ability to complete EOB, chair, toilet/commode transfers @ Mod I in order to increase performance and participation during functional tasks  8  Pt will demonstrate ability to stand for 5 minutes while maintaining good balance with use of RW for UB support PRN  9  Pt will demonstrate ability to tolerate 30-35 minute OT session with no vc'ing for deep breathing or use of energy conservation techniques in order to increase activity tolerance during functional tasks  10  Pt will demonstrate Good carryover of use of energy conservation/compensatory strategies during ADLs and functional tasks in order to increase safety and reduce risk for falls  11  Pt will demonstrate Good attention and participation in continued evaluation of functional ambulation house hold distances in order to assist with safe d/c planning  s  12  Pt will identify 3 areas of interest/hobbies and 1 intervention on how to incorporate into daily life in order to increase interaction with environment and peers as well as increase participation in meaningful tasks  13  Pt will demonstrate 100% carryover of BUE HEP in order to increase BUE MS and increase performance during functional tasks upon d/c home      Jahaira Carrion OTR/L

## 2021-09-06 NOTE — PROGRESS NOTES
[address]  Progress Note - Sujey Leal 1934, 80 y o  female MRN: [id number]  Unit/Bed#: -01 Encounter: [id number]  Primary Care Provider: jS Reyes   Date and time admitted to hospital: 9/3/2021  8:43 AM    * Intractable nausea and vomiting  Assessment & Plan  · Recurrent admission for intractable n/v- had recent TAVR in July 2021 with subsequent hospitalization in August for n/v that seemed to resolve  · Returns now with complaints of recurrent n/v and anorexia for the past few weeks  · Discussed with GI  · Tentative endoscopy on 9/7  · NPO after 2400  · C/w PPI regimen  · PRN emesis control  · Plan to d/c zofran in favor of tigan following EKG results  · Nutrition following to aide in increasing oral intake    Pancytopenia  Assessment & Plan  · Monitor CBC and transfuse as necessary  · Received 1u pRBC on 9/5 2/2 Hgb 7 2  · LDH elevated suggesting hemolysis  · Iron panel unremarkable    Hyperbilirubinemia  Assessment & Plan  · Nonspecific  · RUQ abdominal U/S with liver dopplers ordered and pending completion    Paroxysmal atrial fibrillation   Assessment & Plan  · Rate controlled on lopressor  · Continue with ASA  · Not on chronic AC  · F/u o/p with cardiology    Chronic diastolic CHF  Assessment & Plan  Wt Readings from Last 3 Encounters:   09/03/21 44 7 kg (98 lb 8 7 oz)   08/11/21 48 4 kg (106 lb 12 8 oz)       · Continue home Lopressor  · Not on chronic diuretics  · BNP elevated, will f/u with repeat in the AM  · Does not appear overtly fluid overloaded  · Echo from 6/2021 showed EF 60-65%    Severe protein-calorie malnutrition  Assessment & Plan  Malnutrition Findings:   Adult Malnutrition type: Acute illness (related to N/V as evidenced by < 50% energy intake > 5 days, 8 4% wt loss x 1 mo (8/5/21 107lb, 9/3/21 98lb), severe fat loss to buccal pads and orbitals, severe muscle loss to temporalis, pectoralis and interroseous muscles)  Adult Degree of Malnutrition: Other severe protein calorie malnutrition (Treated with: variety of supplements, advance diet to regular, 4 gm BEATRIZ as appropriate  Recommend daily weights )    BMI Findings:  Adult BMI Classifications: Underweight < 18 5     · Being followed by nutrition  · Will trial on ensure clear and advance to regular diet as tolerated     Body mass index is 16 4 kg/m²  Parkinson's disease   Assessment & Plan  · Continue home regimen of Sinemet and Xadago  · PT/OT consulted; recommending post acute rehab  · Case Management on board assisted dispo    History of transcatheter aortic valve replacement (TAVR)  Assessment & Plan  · History of TAVR 6/2021 at [facility] with 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  · Seen by CT surgery 8/31/21 with recs to d/c plavix 2/2 GI upset  · Following up outpatient      VTE Pharmacologic Prophylaxis: VTE Score: 5 High Risk (Score >/= 5) - Pharmacological DVT Prophylaxis Contraindicated  Sequential Compression Devices Ordered  Patient Centered Rounds: I performed bedside rounds with nursing staff today  Discussions with Specialists or Other Care Team Provider: GI and nutrition following    Education and Discussions with Family / Patient: Updated  (son) via phone  Time Spent for Care: 30 minutes  More than 50% of total time spent on counseling and coordination of care as described above  Current Length of Stay: 3 day(s)  Current Patient Status: Inpatient   Certification Statement: The patient will continue to require additional inpatient hospital stay due to endoscopy and RUQ U/S  Discharge Plan: Anticipate discharge in 48-72 hrs to discharge location to be determined pending rehab evaluations  Code Status: Level 3 - DNAR and DNI    Subjective:   Upon evaluation, pt states she is nauseated and unable to tolerate any oral intake  Admits to receiving zofran this AM without any relief      Objective:     Vitals:   Temp (24hrs), Av °F (36 7 °C), Min:97 6 °F (36 4 °C), Max:98 4 °F (36 9 °C)    Temp:  [97 6 °F (36 4 °C)-98 4 °F (36 9 °C)] 98 2 °F (36 8 °C)  HR:  [63-79] 69  Resp:  [16-20] 19  BP: (113-145)/(51-72) 138/69  SpO2:  [93 %-98 %] 93 %  Body mass index is 16 4 kg/m²  Input and Output Summary (last 24 hours): Intake/Output Summary (Last 24 hours) at 2021 1158  Last data filed at 2021 0930  Gross per 24 hour   Intake 1360 ml   Output 1150 ml   Net 210 ml       Physical Exam:   Physical Exam  Constitutional:       Appearance: Normal appearance  She is cachectic  HENT:      Head: Normocephalic  Cardiovascular:      Rate and Rhythm: Normal rate  Pulses: Normal pulses  Pulmonary:      Effort: Pulmonary effort is normal       Breath sounds: Normal breath sounds  Abdominal:      General: Abdomen is flat  Bowel sounds are normal  There is no distension  Palpations: Abdomen is soft  Tenderness: There is abdominal tenderness (reports generalized discomfort)  Musculoskeletal:         General: No swelling  Right lower leg: No edema  Left lower leg: No edema  Skin:     General: Skin is warm and dry  Capillary Refill: Capillary refill takes less than 2 seconds  Coloration: Skin is pale  Neurological:      General: No focal deficit present  Mental Status: She is alert and oriented to person, place, and time  Mental status is at baseline     Psychiatric:         Mood and Affect: Mood normal           Additional Data:     Labs:  Results from last 7 days   Lab Units 21  0551 21  0859   WBC Thousand/uL 3 12* 3 52*   HEMOGLOBIN g/dL 9 3* 10 0*   HEMATOCRIT % 28 0* 31 0*   PLATELETS Thousands/uL 84* 107*   NEUTROS PCT %  --  72   LYMPHS PCT %  --  16   LYMPHO PCT % 14  --    MONOS PCT %  --  9   MONO PCT % 10  --    EOS PCT % 0 1     Results from last 7 days   Lab Units 21  0503   SODIUM mmol/L 138   POTASSIUM mmol/L 3 7   CHLORIDE mmol/L 104   CO2 mmol/L 28   BUN mg/dL 15   CREATININE mg/dL 0 68   ANION GAP mmol/L 6   CALCIUM mg/dL 7 9*   ALBUMIN g/dL 2 8*   TOTAL BILIRUBIN mg/dL 1 58*   ALK PHOS U/L 76   ALT U/L 7*   AST U/L 42   GLUCOSE RANDOM mg/dL 71                 Results from last 7 days   Lab Units 09/03/21  0859   LACTIC ACID mmol/L 1 2       Lines/Drains:  Invasive Devices     Peripheral Intravenous Line            Peripheral IV 09/03/21 Left Antecubital 3 days                Imaging: Personally reviewed the following imaging: ultrasound(s)  · U/S RUQ 9/3 reveals gallbladder sludge but otherwise no evidence of biliary duct dilation  · Hepatic and R renal cysts noted  · F/u with RUQ US with liver dopplers    Recent Cultures (last 7 days):         Last 24 Hours Medication List:   Current Facility-Administered Medications   Medication Dose Route Frequency Provider Last Rate    acetaminophen  650 mg Oral Q6H PRN Mindy Rattler, DO      ALPRAZolam  0 25 mg Oral TID PRN Melvin Gomez PA-C      aspirin  81 mg Oral Daily Tejinder Hinton, DO      atorvastatin  80 mg Oral Daily Mindy Rattler, DO      carbidopa-levodopa  1 tablet Oral 5x Daily Mindy Rattler, DO      metoclopramide  10 mg Intravenous Q6H PRN Mindy Rattler, DO      metoprolol tartrate  25 mg Oral Q12H [address] Tejinder Hinton, DO      ondansetron  4 mg Intravenous Q4H PRN Mindy Rattler, DO      pantoprazole  40 mg Intravenous Q12H [address] Tejinedr Hinton, DO      pneumococcal 13-valent conjugate vaccine  0 5 mL Intramuscular Prior to discharge Gwen Petersen PA-C      polyethylene glycol  17 g Oral Daily Tejinder Hinton, DO      Safinamide Mesylate  100 mg Oral Daily Tejinder Hinton, DO      saliva substitute  5 spray Mouth/Throat 4x Daily PRN JADA Conroy      senna-docusate sodium  1 tablet Oral HS Mindy Rattler, DO          Today, Patient Was Seen By: Ninfa De La Cruz RN    **Please Note: This note may have been constructed using a voice recognition system  **

## 2021-09-06 NOTE — ASSESSMENT & PLAN NOTE
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

## 2021-09-06 NOTE — ASSESSMENT & PLAN NOTE
· Recurrent admission for intractable n/v- had recent TAVR in July 2021 with subsequent hospitalization in August for n/v that seemed to resolve  · Returns now with complaints of recurrent n/v and anorexia for the past few weeks  · Discussed with GI  · Tentative endoscopy on 9/7  · NPO after 2400  · C/w PPI regimen  · PRN emesis control  · Plan to d/c zofran in favor of tigan following EKG results  · Nutrition following to aide in increasing oral intake

## 2021-09-06 NOTE — PLAN OF CARE
Problem: Potential for Falls  Goal: Patient will remain free of falls  Description: INTERVENTIONS:  - Educate patient/family on patient safety including physical limitations  - Instruct patient to call for assistance with activity   - Consult OT/PT to assist with strengthening/mobility   - Keep Call bell within reach  - Keep bed low and locked with side rails adjusted as appropriate  - Keep care items and personal belongings within reach  - Initiate and maintain comfort rounds  - Make Fall Risk Sign visible to staff  - Offer Toileting every   Hours, in advance of need  - Initiate/Maintain   alarm  - Obtain necessary fall risk management equipment:   - Apply yellow socks and bracelet for high fall risk patients  - Consider moving patient to room near nurses station  Outcome: Progressing     Problem: MOBILITY - ADULT  Goal: Maintain or return to baseline ADL function  Description: INTERVENTIONS:  -  Assess patient's ability to carry out ADLs; assess patient's baseline for ADL function and identify physical deficits which impact ability to perform ADLs (bathing, care of mouth/teeth, toileting, grooming, dressing, etc )  - Assess/evaluate cause of self-care deficits   - Assess range of motion  - Assess patient's mobility; develop plan if impaired  - Assess patient's need for assistive devices and provide as appropriate  - Encourage maximum independence but intervene and supervise when necessary  - Involve family in performance of ADLs  - Assess for home care needs following discharge   - Consider OT consult to assist with ADL evaluation and planning for discharge  - Provide patient education as appropriate  Outcome: Progressing  Goal: Maintains/Returns to pre admission functional level  Description: INTERVENTIONS:  - Perform BMAT or MOVE assessment daily    - Set and communicate daily mobility goal to care team and patient/family/caregiver     - Collaborate with rehabilitation services on mobility goals if consulted  - Perform Range of Motion   times a day  - Reposition patient every   hours  - Dangle patient   times a day  - Stand patient   times a day  - Ambulate patient   times a day  - Out of bed to chair   times a day   - Out of bed for meals   times a day  - Out of bed for toileting  - Record patient progress and toleration of activity level   Outcome: Progressing     Problem: Nutrition/Hydration-ADULT  Goal: Nutrient/Hydration intake appropriate for improving, restoring or maintaining nutritional needs  Description: Monitor and assess patient's nutrition/hydration status for malnutrition  Collaborate with interdisciplinary team and initiate plan and interventions as ordered  Monitor patient's weight and dietary intake as ordered or per policy  Utilize nutrition screening tool and intervene as necessary  Determine patient's food preferences and provide high-protein, high-caloric foods as appropriate       INTERVENTIONS:  - Monitor oral intake, urinary output, labs, and treatment plans  - Assess nutrition and hydration status and recommend course of action  - Evaluate amount of meals eaten  - Assist patient with eating if necessary   - Allow adequate time for meals  - Recommend/ encourage appropriate diets, oral nutritional supplements, and vitamin/mineral supplements  - Order, calculate, and assess calorie counts as needed  - Recommend, monitor, and adjust tube feedings and TPN/PPN based on assessed needs  - Assess need for intravenous fluids  - Provide specific nutrition/hydration education as appropriate  - Include patient/family/caregiver in decisions related to nutrition  Outcome: Progressing     Problem: PAIN - ADULT  Goal: Verbalizes/displays adequate comfort level or baseline comfort level  Description: Interventions:  - Encourage patient to monitor pain and request assistance  - Assess pain using appropriate pain scale  - Administer analgesics based on type and severity of pain and evaluate response  - Implement non-pharmacological measures as appropriate and evaluate response  - Consider cultural and social influences on pain and pain management  - Notify physician/advanced practitioner if interventions unsuccessful or patient reports new pain  Outcome: Progressing     Problem: INFECTION - ADULT  Goal: Absence or prevention of progression during hospitalization  Description: INTERVENTIONS:  - Assess and monitor for signs and symptoms of infection  - Monitor lab/diagnostic results  - Monitor all insertion sites, i e  indwelling lines, tubes, and drains  - Monitor endotracheal if appropriate and nasal secretions for changes in amount and color  - [place] appropriate cooling/warming therapies per order  - Administer medications as ordered  - Instruct and encourage patient and family to use good hand hygiene technique  - Identify and instruct in appropriate isolation precautions for identified infection/condition  Outcome: Progressing     Problem: SAFETY ADULT  Goal: Patient will remain free of falls  Description: INTERVENTIONS:  - Educate patient/family on patient safety including physical limitations  - Instruct patient to call for assistance with activity   - Consult OT/PT to assist with strengthening/mobility   - Keep Call bell within reach  - Keep bed low and locked with side rails adjusted as appropriate  - Keep care items and personal belongings within reach  - Initiate and maintain comfort rounds  - Make Fall Risk Sign visible to staff  - Offer Toileting every   Hours, in advance of need  - Initiate/Maintain   Ferol [place]   alarm  - Obtain necessary fall risk management equipment:     - Apply yellow socks and bracelet for high fall risk patients  - Consider moving patient to room near nurses station  Outcome: Progressing  Goal: Maintain or return to baseline ADL function  Description: INTERVENTIONS:  -  Assess patient's ability to carry out ADLs; assess patient's baseline for ADL function and identify physical deficits which impact ability to perform ADLs (bathing, care of mouth/teeth, toileting, grooming, dressing, etc )  - Assess/evaluate cause of self-care deficits   - Assess range of motion  - Assess patient's mobility; develop plan if impaired  - Assess patient's need for assistive devices and provide as appropriate  - Encourage maximum independence but intervene and supervise when necessary  - Involve family in performance of ADLs  - Assess for home care needs following discharge   - Consider OT consult to assist with ADL evaluation and planning for discharge  - Provide patient education as appropriate  Outcome: Progressing  Goal: Maintains/Returns to pre admission functional level  Description: INTERVENTIONS:  - Perform BMAT or MOVE assessment daily    - Set and communicate daily mobility goal to care team and patient/family/caregiver  - Collaborate with rehabilitation services on mobility goals if consulted  - Perform Range of Motion   times a day  - Reposition patient every   hours  - Dangle patient   times a day  - Stand patient   times a day  - Ambulate patient   times a day  - Out of bed to chair   times a day   - Out of bed for meals    times a day  - Out of bed for toileting  - Record patient progress and toleration of activity level   Outcome: Progressing     Problem: DISCHARGE PLANNING  Goal: Discharge to home or other facility with appropriate resources  Description: INTERVENTIONS:  - Identify barriers to discharge w/patient and caregiver  - Arrange for needed discharge resources and transportation as appropriate  - Identify discharge learning needs (meds, wound care, etc )  - Arrange for interpretive services to assist at discharge as needed  - Refer to Case Management Department for coordinating discharge planning if the patient needs post-hospital services based on physician/advanced practitioner order or complex needs related to functional status, cognitive ability, or social support system  Outcome: Progressing     Problem: Knowledge Deficit  Goal: Patient/family/caregiver demonstrates understanding of disease process, treatment plan, medications, and discharge instructions  Description: Complete learning assessment and assess knowledge base    Interventions:  - Provide teaching at level of understanding  - Provide teaching via preferred learning methods  Outcome: Progressing     Problem: GASTROINTESTINAL - ADULT  Goal: Minimal or absence of nausea and/or vomiting  Description: INTERVENTIONS:  - Administer IV fluids if ordered to ensure adequate hydration  - Maintain NPO status until nausea and vomiting are resolved  - Nasogastric tube if ordered  - Administer ordered antiemetic medications as needed  - Provide nonpharmacologic comfort measures as appropriate  - Advance diet as tolerated, if ordered  - Consider nutrition services referral to assist patient with adequate nutrition and appropriate food choices  Outcome: Progressing  Goal: Maintains adequate nutritional intake  Description: INTERVENTIONS:  - Monitor percentage of each meal consumed  - Identify factors contributing to decreased intake, treat as appropriate  - Assist with meals as needed  - Monitor I&O, weight, and lab values if indicated  - Obtain nutrition services referral as needed  Outcome: Progressing     Problem: METABOLIC, FLUID AND ELECTROLYTES - ADULT  Goal: Electrolytes maintained within normal limits  Description: INTERVENTIONS:  - Monitor labs and assess patient for signs and symptoms of electrolyte imbalances  - Administer electrolyte replacement as ordered  - Monitor response to electrolyte replacements, including repeat lab results as appropriate  - Instruct patient on fluid and nutrition as appropriate  Outcome: Progressing  Goal: Fluid balance maintained  Description: INTERVENTIONS:  - Monitor labs   - Monitor I/O and WT  - Instruct patient on fluid and nutrition as appropriate  - Assess for signs & symptoms of volume excess or deficit  Outcome: Progressing

## 2021-09-06 NOTE — PHYSICAL THERAPY NOTE
Physical Therapy Evaluation     Patient's Name: Marcus Anderson    Admitting Diagnosis  Gastritis [K29 70]  Nausea [R11 0]  Nausea & vomiting [R11 2]    Problem List  Patient Active Problem List   Diagnosis    Acute on chronic anemia    History of transcatheter aortic valve replacement (TAVR)    Hyponatremia    Acute cystitis without hematuria    Parkinson's disease     Hiatal hernia    Mild protein-calorie malnutrition (HCC)    Thrombocytopenia (HCC)    Severe protein-calorie malnutrition    Constipation    New onset atrial fibrillation (HCC)    Chronic diastolic CHF    Paroxysmal atrial fibrillation     Intractable nausea and vomiting    Hyperbilirubinemia    Pancytopenia       Past Medical History  Past Medical History:   Diagnosis Date    Diastolic congestive heart failure (HCC)     DVT (deep venous thrombosis) (HCC)     History of transcatheter aortic valve replacement (TAVR)     [facility]     Parkinson's disease [facility] )     Paroxysmal atrial fibrillation (HCC)     UTI (urinary tract infection)        Past Surgical History  Past Surgical History:   Procedure Laterality Date    BREAST SURGERY      CARDIAC SURGERY      HYSTERECTOMY      REPLACEMENT AORTIC VALVE TRANSCATHETER (TAVR)      TUBAL LIGATION          09/06/21 1151   PT Last Visit   PT Visit Date 09/06/21   Note Type   Note type Evaluation   Pain Assessment   Pain Assessment Tool Pain Assessment not indicated - pt denies pain   Home Living   Type of [address] [facility] One level;Stairs to enter with rails  (2 JOSHUA with HR)   Bathroom Shower/Tub Tub/shower unit   Bathroom Toilet Standard   Bathroom Equipment Grab bars in shower; Shower chair;Grab bars around toilet   [address] Walker;Cane  (not used at baseline; has 2 rollators)   Additional Comments pt enjoys working around the house   Prior Function   Level of [place] Independent with ADLs and functional mobility   Lives With Alone   Receives Help From Family  (children live near by)   ADL Assistance Independent   IADLs Independent  ( biweekly)   Falls in the last 6 months 0   Vocational Retired   Comments +  fo short distances   Restrictions/Precautions   [place] Bearing Precautions Per Order No   Other Precautions Fall Risk; Chair Alarm; Bed Alarm   General   Family/Caregiver Present No   Cognition   Overall Cognitive Status WFL   Arousal/Participation Alert   Orientation Level Oriented X4   Memory Within functional limits   Following Commands Follows all commands and directions without difficulty   Comments pt agreeable to PT session   RLE Assessment   RLE Assessment X   Strength RLE   RLE Overall Strength 4-/5   LLE Assessment   LLE Assessment X   Strength LLE   LLE Overall Strength 4-/5   Coordination   Movements are Fluid and Coordinated 1   Sensation X   Light Touch   RLE Light Touch Grossly intact   LLE Light Touch Impaired   LLE Light Touch Comments pt reports L sided numbness and tingleing due to PD   Bed Mobility   Supine to Sit 5  Supervision   Additional items Verbal cues; Increased time required; Bedrails   Additional Comments pt reported wooziness with transition that subsided within 40 seconds however BP was 138/69   Transfers   Sit to Stand   (CGA)   Additional items Verbal cues; Increased time required   Stand to Sit   (CGA)   Additional items Verbal cues; Increased time required;Armrests   Toilet transfer   (CGA)   Additional items Standard toilet;Verbal cues; Increased time required  (grab bar; Indep with pericare and clothing mgt)   Additional Comments pt had initiatial postural sway   Ambulation/Elevation   Gait pattern Short stride;Decreased foot clearance;Shuffling; Forward Flexion  (cues to keep feet  inside RW)   Gait Assistance   (CGA)   Additional items Verbal cues   Assistive Device Rolling walker   Distance 15 ft x 2  (pt reports she feels much weaker compared to normal) Balance   Static Sitting Good   Dynamic Sitting Fair +   Static Standing Fair   Dynamic Standing Fair -   Ambulatory Fair -   Endurance Deficit   Endurance Deficit Yes   Endurance Deficit Description pt reported feeling weaker compared to normal   Activity Tolerance   Activity Tolerance Patient limited by fatigue   Medical Staff Made Aware Pt seen as a co-eval with OT due to the patient's co-morbidities, clinically unstable presentation, and present impairments which are a regression from the patient's baseline  Nurse Made Aware MURALI Kee vereablized pt appropriate for session   Assessment   Prognosis Fair   Problem List Decreased strength;Decreased endurance; Impaired balance;Decreased mobility   Assessment Pt is 80 y o  female seen for PT evaluation s/p admit to [address] [place] [address] on 9/3/2021 w/ Intractable nausea and vomiting  PT consulted to assess pt's functional mobility and d/c needs  Order placed for PT eval and tx, w/ up and OOB as tolerated order  Comorbidities affecting pt's physical performance at time of assessment include: pancytopenia, a fib, Parkinsons disease and CHF  PTA, pt was independent w/ all functional mobility w/ no AD, ambulates community distances and elevations, has 2 JOSHUA, lives w/ self in 1 level home and retired  Personal factors affecting pt at time of IE include: ambulating w/ assistive device, stairs to enter home and inability to ambulate household distances  Please find objective findings from PT assessment regarding body systems outlined above with impairments and limitations including weakness, impaired balance, decreased endurance, gait deviations, decreased activity tolerance, decreased functional mobility tolerance, altered sensation and fall risk  Pt's clinical presentation is currently unstable/unpredictable seen in pt's presentation of wozziness with transitions, abnormal blood cell level counts and decline in mobility status   Pt to benefit from continued PT tx to address deficits as defined above and maximize level of functional independent mobility and consistency  From PT/mobility standpoint, recommendation at time of d/c would be post acute rehabilitation services pending progress in order to facilitate return to PLOF  Barriers to Discharge Decreased caregiver support   Goals   Patient Goals to get back to house and take care of herself   LTG Expiration Date 09/16/21   Long Term Goal #1 Pt will: Perform bed mobility tasks to modified I to improve ease of bed mobility  Perform transfers to modified I to improve ease of transfers  Perform ambulation with MI and RW for 250 ft to   increase Indep in home environment  Increase dynamic standing balance to F+ to decrease fall risk  Increase BLE strength to 4+/5 to improve functional mobility  Increase OOB activity tolerance to 10 minutes without s/s of exertion to decrease fall risk  Navigate up and down 2 steps with MI so patient can enter and exit home  PT Treatment Day 0   Plan   Treatment/Interventions Functional transfer training;LE strengthening/ROM; Elevations; Therapeutic exercise; Endurance training;Bed mobility;Gait training   PT Frequency   (3-5x/wk)   Recommendation   PT Discharge Recommendation Post acute rehabilitation services   Equipment Recommended [facility] Recommended Wheeled walker   Change/add to [employer]? No   PT - OK to Discharge Yes  (to STR when medically stable)   Additional Comments upon conclusion pt seated in recliner with all needs in reach and alarm intact   Additional Comments 2 The patient's AM-PAC Basic Mobility Inpatient Short Form Raw Score is 19, Standardized Score is 42 48  A standardized score less than 42 9 suggests the patient may benefit from discharge to post-acute rehabilitation services  Please also refer to the recommendation of the Physical Therapist for safe discharge planning     AM-PAC Basic Mobility Inpatient   Turning in Bed Without Bedrails 4   Lying on Back to Sitting on Edge of Flat Bed 3   Moving Bed to Chair 3   Standing Up From Chair 3   Walk in Room 3   Climb 3-5 Stairs 3   Basic Mobility Inpatient Raw Score 19   Basic Mobility Standardized Score 42 48           Pippa Peters, PT

## 2021-09-06 NOTE — PLAN OF CARE
Problem: PHYSICAL THERAPY ADULT  Goal: Performs mobility at highest level of function for planned discharge setting  See evaluation for individualized goals  Description: Treatment/Interventions: Functional transfer training, LE strengthening/ROM, Elevations, Therapeutic exercise, Endurance training, Bed mobility, Gait training  Equipment Recommended: Rose Chambers       See flowsheet documentation for full assessment, interventions and recommendations  Outcome: Progressing  Note: Prognosis: Fair  Problem List: Decreased strength, Decreased endurance, Impaired balance, Decreased mobility  Assessment: Pt is 80 y o  female seen for PT evaluation s/p admit to [address] [place] [address] on 9/3/2021 w/ Intractable nausea and vomiting  PT consulted to assess pt's functional mobility and d/c needs  Order placed for PT eval and tx, w/ up and OOB as tolerated order  Comorbidities affecting pt's physical performance at time of assessment include: pancytopenia, a fib, Parkinsons disease and CHF  PTA, pt was independent w/ all functional mobility w/ no AD, ambulates community distances and elevations, has 2 JOSHUA, lives w/ self in 1 level home and retired  Personal factors affecting pt at time of IE include: ambulating w/ assistive device, stairs to enter home and inability to ambulate household distances  Please find objective findings from PT assessment regarding body systems outlined above with impairments and limitations including weakness, impaired balance, decreased endurance, gait deviations, decreased activity tolerance, decreased functional mobility tolerance, altered sensation and fall risk  Pt's clinical presentation is currently unstable/unpredictable seen in pt's presentation of wozziness with transitions, abnormal blood cell level counts and decline in mobility status  Pt to benefit from continued PT tx to address deficits as defined above and maximize level of functional independent mobility and consistency   From PT/mobility standpoint, recommendation at time of d/c would be post acute rehabilitation services pending progress in order to facilitate return to PLOF  Barriers to Discharge: Decreased caregiver support        PT Discharge Recommendation: Post acute rehabilitation services     PT - OK to Discharge: Yes (to STR when medically stable)    See flowsheet documentation for full assessment   Howie Lyons, PT

## 2021-09-07 ENCOUNTER — ANESTHESIA EVENT (INPATIENT)
Dept: GASTROENTEROLOGY | Facility: HOSPITAL | Age: 86
DRG: 808 | End: 2021-09-07
Payer: COMMERCIAL

## 2021-09-07 ENCOUNTER — APPOINTMENT (INPATIENT)
Dept: GASTROENTEROLOGY | Facility: HOSPITAL | Age: 86
DRG: 808 | End: 2021-09-07
Attending: INTERNAL MEDICINE
Payer: COMMERCIAL

## 2021-09-07 ENCOUNTER — ANESTHESIA (INPATIENT)
Dept: GASTROENTEROLOGY | Facility: HOSPITAL | Age: 86
DRG: 808 | End: 2021-09-07
Payer: COMMERCIAL

## 2021-09-07 PROBLEM — E80.6 HYPERBILIRUBINEMIA: Status: RESOLVED | Noted: 2021-09-03 | Resolved: 2021-09-07

## 2021-09-07 LAB
ALBUMIN SERPL BCP-MCNC: 3 G/DL (ref 3.5–5)
ALP SERPL-CCNC: 129 U/L (ref 46–116)
ALT SERPL W P-5'-P-CCNC: 8 U/L (ref 12–78)
ANION GAP SERPL CALCULATED.3IONS-SCNC: 9 MMOL/L (ref 4–13)
APTT PPP: 28 SECONDS (ref 23–37)
AST SERPL W P-5'-P-CCNC: 54 U/L (ref 5–45)
BILIRUB DIRECT SERPL-MCNC: 0.66 MG/DL (ref 0–0.2)
BILIRUB SERPL-MCNC: 2.37 MG/DL (ref 0.2–1)
BUN SERPL-MCNC: 17 MG/DL (ref 5–25)
CALCIUM ALBUM COR SERPL-MCNC: 9 MG/DL (ref 8.3–10.1)
CALCIUM SERPL-MCNC: 8.2 MG/DL (ref 8.3–10.1)
CHLORIDE SERPL-SCNC: 102 MMOL/L (ref 100–108)
CO2 SERPL-SCNC: 28 MMOL/L (ref 21–32)
CREAT SERPL-MCNC: 0.7 MG/DL (ref 0.6–1.3)
CRP SERPL QL: 1.1 MG/L
D DIMER PPP FEU-MCNC: 1.12 UG/ML FEU
DAT POLY-SP REAG RBC QL: NEGATIVE
ERYTHROCYTE [DISTWIDTH] IN BLOOD BY AUTOMATED COUNT: 20.9 % (ref 11.6–15.1)
FIBRINOGEN PPP-MCNC: 151 MG/DL (ref 227–495)
GFR SERPL CREATININE-BSD FRML MDRD: 79 ML/MIN/1.73SQ M
GLUCOSE SERPL-MCNC: 131 MG/DL (ref 65–140)
GLUCOSE SERPL-MCNC: 61 MG/DL (ref 65–140)
GLUCOSE SERPL-MCNC: 64 MG/DL (ref 65–140)
HCT VFR BLD AUTO: 27.6 % (ref 34.8–46.1)
HGB BLD-MCNC: 9 G/DL (ref 11.5–15.4)
INR PPP: 1.01 (ref 0.84–1.19)
MCH RBC QN AUTO: 30.7 PG (ref 26.8–34.3)
MCHC RBC AUTO-ENTMCNC: 32.6 G/DL (ref 31.4–37.4)
MCV RBC AUTO: 94 FL (ref 82–98)
NRBC BLD AUTO-RTO: 0 /100 WBCS
NT-PROBNP SERPL-MCNC: 3134 PG/ML
PLATELET # BLD AUTO: 74 THOUSANDS/UL (ref 149–390)
POTASSIUM SERPL-SCNC: 4 MMOL/L (ref 3.5–5.3)
PROT SERPL-MCNC: 5.2 G/DL (ref 6.4–8.2)
PROTHROMBIN TIME: 13.2 SECONDS (ref 11.6–14.5)
RBC # BLD AUTO: 2.93 MILLION/UL (ref 3.81–5.12)
SODIUM SERPL-SCNC: 139 MMOL/L (ref 136–145)
WBC # BLD AUTO: 4.38 THOUSAND/UL (ref 4.31–10.16)

## 2021-09-07 PROCEDURE — 88305 TISSUE EXAM BY PATHOLOGIST: CPT | Performed by: PATHOLOGY

## 2021-09-07 PROCEDURE — 80053 COMPREHEN METABOLIC PANEL: CPT | Performed by: NURSE PRACTITIONER

## 2021-09-07 PROCEDURE — 82248 BILIRUBIN DIRECT: CPT | Performed by: FAMILY MEDICINE

## 2021-09-07 PROCEDURE — 99232 SBSQ HOSP IP/OBS MODERATE 35: CPT | Performed by: FAMILY MEDICINE

## 2021-09-07 PROCEDURE — 0DB78ZX EXCISION OF STOMACH, PYLORUS, VIA NATURAL OR ARTIFICIAL OPENING ENDOSCOPIC, DIAGNOSTIC: ICD-10-PCS | Performed by: INTERNAL MEDICINE

## 2021-09-07 PROCEDURE — 82948 REAGENT STRIP/BLOOD GLUCOSE: CPT

## 2021-09-07 PROCEDURE — 85730 THROMBOPLASTIN TIME PARTIAL: CPT | Performed by: FAMILY MEDICINE

## 2021-09-07 PROCEDURE — 86880 COOMBS TEST DIRECT: CPT | Performed by: FAMILY MEDICINE

## 2021-09-07 PROCEDURE — 83880 ASSAY OF NATRIURETIC PEPTIDE: CPT | Performed by: NURSE PRACTITIONER

## 2021-09-07 PROCEDURE — 85384 FIBRINOGEN ACTIVITY: CPT | Performed by: FAMILY MEDICINE

## 2021-09-07 PROCEDURE — 0DB68ZX EXCISION OF STOMACH, VIA NATURAL OR ARTIFICIAL OPENING ENDOSCOPIC, DIAGNOSTIC: ICD-10-PCS | Performed by: INTERNAL MEDICINE

## 2021-09-07 PROCEDURE — 0DB98ZX EXCISION OF DUODENUM, VIA NATURAL OR ARTIFICIAL OPENING ENDOSCOPIC, DIAGNOSTIC: ICD-10-PCS | Performed by: INTERNAL MEDICINE

## 2021-09-07 PROCEDURE — 86140 C-REACTIVE PROTEIN: CPT | Performed by: FAMILY MEDICINE

## 2021-09-07 PROCEDURE — 85027 COMPLETE CBC AUTOMATED: CPT | Performed by: INTERNAL MEDICINE

## 2021-09-07 PROCEDURE — 85610 PROTHROMBIN TIME: CPT | Performed by: FAMILY MEDICINE

## 2021-09-07 PROCEDURE — 85379 FIBRIN DEGRADATION QUANT: CPT | Performed by: FAMILY MEDICINE

## 2021-09-07 PROCEDURE — C9113 INJ PANTOPRAZOLE SODIUM, VIA: HCPCS | Performed by: INTERNAL MEDICINE

## 2021-09-07 RX ORDER — SODIUM CHLORIDE, SODIUM LACTATE, POTASSIUM CHLORIDE, CALCIUM CHLORIDE 600; 310; 30; 20 MG/100ML; MG/100ML; MG/100ML; MG/100ML
50 INJECTION, SOLUTION INTRAVENOUS CONTINUOUS
Status: DISCONTINUED | OUTPATIENT
Start: 2021-09-07 | End: 2021-09-07

## 2021-09-07 RX ORDER — PANTOPRAZOLE SODIUM 40 MG/1
40 TABLET, DELAYED RELEASE ORAL
Status: DISCONTINUED | OUTPATIENT
Start: 2021-09-08 | End: 2021-09-09 | Stop reason: HOSPADM

## 2021-09-07 RX ORDER — MIRTAZAPINE 15 MG/1
7.5 TABLET, FILM COATED ORAL
Status: DISCONTINUED | OUTPATIENT
Start: 2021-09-07 | End: 2021-09-09 | Stop reason: HOSPADM

## 2021-09-07 RX ORDER — LIDOCAINE HYDROCHLORIDE 10 MG/ML
INJECTION, SOLUTION EPIDURAL; INFILTRATION; INTRACAUDAL; PERINEURAL AS NEEDED
Status: DISCONTINUED | OUTPATIENT
Start: 2021-09-07 | End: 2021-09-07

## 2021-09-07 RX ORDER — SODIUM CHLORIDE, SODIUM LACTATE, POTASSIUM CHLORIDE, CALCIUM CHLORIDE 600; 310; 30; 20 MG/100ML; MG/100ML; MG/100ML; MG/100ML
INJECTION, SOLUTION INTRAVENOUS CONTINUOUS PRN
Status: DISCONTINUED | OUTPATIENT
Start: 2021-09-07 | End: 2021-09-07

## 2021-09-07 RX ORDER — DEXTROSE MONOHYDRATE 25 G/50ML
INJECTION, SOLUTION INTRAVENOUS
Status: COMPLETED
Start: 2021-09-07 | End: 2021-09-07

## 2021-09-07 RX ORDER — DEXTROSE MONOHYDRATE 25 G/50ML
25 INJECTION, SOLUTION INTRAVENOUS ONCE
Status: COMPLETED | OUTPATIENT
Start: 2021-09-07 | End: 2021-09-07

## 2021-09-07 RX ORDER — PROPOFOL 10 MG/ML
INJECTION, EMULSION INTRAVENOUS AS NEEDED
Status: DISCONTINUED | OUTPATIENT
Start: 2021-09-07 | End: 2021-09-07

## 2021-09-07 RX ADMIN — CARBIDOPA AND LEVODOPA 1 TABLET: 50; 200 TABLET, EXTENDED RELEASE ORAL at 18:41

## 2021-09-07 RX ADMIN — SAFINAMIDE MESYLATE 100 MG: 100 TABLET, FILM COATED ORAL at 15:52

## 2021-09-07 RX ADMIN — PROPOFOL 30 MG: 10 INJECTION, EMULSION INTRAVENOUS at 14:47

## 2021-09-07 RX ADMIN — PROPOFOL 30 MG: 10 INJECTION, EMULSION INTRAVENOUS at 14:50

## 2021-09-07 RX ADMIN — PANTOPRAZOLE SODIUM 40 MG: 40 INJECTION, POWDER, FOR SOLUTION INTRAVENOUS at 07:37

## 2021-09-07 RX ADMIN — MIRTAZAPINE 7.5 MG: 15 TABLET, FILM COATED ORAL at 22:47

## 2021-09-07 RX ADMIN — CARBIDOPA AND LEVODOPA 1 TABLET: 50; 200 TABLET, EXTENDED RELEASE ORAL at 10:20

## 2021-09-07 RX ADMIN — DEXTROSE MONOHYDRATE 25 ML: 25 INJECTION, SOLUTION INTRAVENOUS at 07:33

## 2021-09-07 RX ADMIN — METOCLOPRAMIDE HYDROCHLORIDE 10 MG: 5 INJECTION INTRAMUSCULAR; INTRAVENOUS at 07:37

## 2021-09-07 RX ADMIN — SODIUM CHLORIDE, SODIUM LACTATE, POTASSIUM CHLORIDE, AND CALCIUM CHLORIDE: .6; .31; .03; .02 INJECTION, SOLUTION INTRAVENOUS at 13:30

## 2021-09-07 RX ADMIN — LIDOCAINE HYDROCHLORIDE 50 MG: 10 INJECTION, SOLUTION EPIDURAL; INFILTRATION; INTRACAUDAL; PERINEURAL at 14:41

## 2021-09-07 RX ADMIN — PROPOFOL 20 MG: 10 INJECTION, EMULSION INTRAVENOUS at 14:44

## 2021-09-07 RX ADMIN — CARBIDOPA AND LEVODOPA 1 TABLET: 50; 200 TABLET, EXTENDED RELEASE ORAL at 15:52

## 2021-09-07 RX ADMIN — TRIMETHOBENZAMIDE HYDROCHLORIDE 200 MG: 100 INJECTION INTRAMUSCULAR at 03:05

## 2021-09-07 RX ADMIN — PROPOFOL 50 MG: 10 INJECTION, EMULSION INTRAVENOUS at 14:42

## 2021-09-07 RX ADMIN — ALPRAZOLAM 0.25 MG: 0.25 TABLET ORAL at 07:32

## 2021-09-07 RX ADMIN — METOPROLOL TARTRATE 25 MG: 25 TABLET, FILM COATED ORAL at 10:20

## 2021-09-07 RX ADMIN — DOCUSATE SODIUM AND SENNOSIDES 1 TABLET: 8.6; 5 TABLET ORAL at 22:47

## 2021-09-07 RX ADMIN — DEXTROSE MONOHYDRATE 25 ML: 500 INJECTION PARENTERAL at 07:33

## 2021-09-07 NOTE — ANESTHESIA POSTPROCEDURE EVALUATION
Post-Op Assessment Note    CV Status:  Stable  Pain Score: 0    Pain management: adequate     Mental Status:  Alert and awake   Hydration Status:  Euvolemic   PONV Controlled:  Controlled   Airway Patency:  Patent      Post Op Vitals Reviewed: Yes      Staff: CRNA         No complications documented      BP   105/64   Temp      Pulse 67   Resp 16   SpO2 99

## 2021-09-07 NOTE — ASSESSMENT & PLAN NOTE
Nonspecific - adominal ultrasound without pathology - serum bilirubin level downtrending on IV fluids    Results from last 7 days   Lab Units 09/07/21  0513 09/05/21  0503 09/04/21  0600 09/03/21  0859   TOTAL BILIRUBIN mg/dL 2 37* 1 58* 2 14* 2 47*

## 2021-09-07 NOTE — PLAN OF CARE
Problem: Potential for Falls  Goal: Patient will remain free of falls  Description: INTERVENTIONS:  - Educate patient/family on patient safety including physical limitations  - Instruct patient to call for assistance with activity   - Consult OT/PT to assist with strengthening/mobility   - Keep Call bell within reach  - Keep bed low and locked with side rails adjusted as appropriate  - Keep care items and personal belongings within reach  - Initiate and maintain comfort rounds  - Make Fall Risk Sign visible to staff  - Offer Toileting every   Hours, in advance of need  - Initiate/Maintain   alarm  - Obtain necessary fall risk management equipment:   - Apply yellow socks and bracelet for high fall risk patients  - Consider moving patient to room near nurses station  Outcome: Progressing     Problem: MOBILITY - ADULT  Goal: Maintain or return to baseline ADL function  Description: INTERVENTIONS:  -  Assess patient's ability to carry out ADLs; assess patient's baseline for ADL function and identify physical deficits which impact ability to perform ADLs (bathing, care of mouth/teeth, toileting, grooming, dressing, etc )  - Assess/evaluate cause of self-care deficits   - Assess range of motion  - Assess patient's mobility; develop plan if impaired  - Assess patient's need for assistive devices and provide as appropriate  - Encourage maximum independence but intervene and supervise when necessary  - Involve family in performance of ADLs  - Assess for home care needs following discharge   - Consider OT consult to assist with ADL evaluation and planning for discharge  - Provide patient education as appropriate  Outcome: Progressing  Goal: Maintains/Returns to pre admission functional level  Description: INTERVENTIONS:  - Perform BMAT or MOVE assessment daily    - Set and communicate daily mobility goal to care team and patient/family/caregiver     - Collaborate with rehabilitation services on mobility goals if consulted  - Perform Range of Motion   times a day  - Reposition patient every   hours  - Dangle patient   times a day  - Stand patient   times a day  - Ambulate patient   times a day  - Out of bed to chair   times a day   - Out of bed for meals   times a day  - Out of bed for toileting  - Record patient progress and toleration of activity level   Outcome: Progressing     Problem: Nutrition/Hydration-ADULT  Goal: Nutrient/Hydration intake appropriate for improving, restoring or maintaining nutritional needs  Description: Monitor and assess patient's nutrition/hydration status for malnutrition  Collaborate with interdisciplinary team and initiate plan and interventions as ordered  Monitor patient's weight and dietary intake as ordered or per policy  Utilize nutrition screening tool and intervene as necessary  Determine patient's food preferences and provide high-protein, high-caloric foods as appropriate       INTERVENTIONS:  - Monitor oral intake, urinary output, labs, and treatment plans  - Assess nutrition and hydration status and recommend course of action  - Evaluate amount of meals eaten  - Assist patient with eating if necessary   - Allow adequate time for meals  - Recommend/ encourage appropriate diets, oral nutritional supplements, and vitamin/mineral supplements  - Order, calculate, and assess calorie counts as needed  - Recommend, monitor, and adjust tube feedings and TPN/PPN based on assessed needs  - Assess need for intravenous fluids  - Provide specific nutrition/hydration education as appropriate  - Include patient/family/caregiver in decisions related to nutrition  Outcome: Progressing     Problem: PAIN - ADULT  Goal: Verbalizes/displays adequate comfort level or baseline comfort level  Description: Interventions:  - Encourage patient to monitor pain and request assistance  - Assess pain using appropriate pain scale  - Administer analgesics based on type and severity of pain and evaluate response  - Implement non-pharmacological measures as appropriate and evaluate response  - Consider cultural and social influences on pain and pain management  - Notify physician/advanced practitioner if interventions unsuccessful or patient reports new pain  Outcome: Progressing     Problem: INFECTION - ADULT  Goal: Absence or prevention of progression during hospitalization  Description: INTERVENTIONS:  - Assess and monitor for signs and symptoms of infection  - Monitor lab/diagnostic results  - Monitor all insertion sites, i e  indwelling lines, tubes, and drains  - Monitor endotracheal if appropriate and nasal secretions for changes in amount and color  - Elbert appropriate cooling/warming therapies per order  - Administer medications as ordered  - Instruct and encourage patient and family to use good hand hygiene technique  - Identify and instruct in appropriate isolation precautions for identified infection/condition  Outcome: Progressing     Problem: SAFETY ADULT  Goal: Patient will remain free of falls  Description: INTERVENTIONS:  - Educate patient/family on patient safety including physical limitations  - Instruct patient to call for assistance with activity   - Consult OT/PT to assist with strengthening/mobility   - Keep Call bell within reach  - Keep bed low and locked with side rails adjusted as appropriate  - Keep care items and personal belongings within reach  - Initiate and maintain comfort rounds  - Make Fall Risk Sign visible to staff  - Offer Toileting every   Hours, in advance of need  - Initiate/Maintain   alarm  - Obtain necessary fall risk management equipment:     - Apply yellow socks and bracelet for high fall risk patients  - Consider moving patient to room near nurses station  Outcome: Progressing  Goal: Maintain or return to baseline ADL function  Description: INTERVENTIONS:  -  Assess patient's ability to carry out ADLs; assess patient's baseline for ADL function and identify physical deficits which impact ability to perform ADLs (bathing, care of mouth/teeth, toileting, grooming, dressing, etc )  - Assess/evaluate cause of self-care deficits   - Assess range of motion  - Assess patient's mobility; develop plan if impaired  - Assess patient's need for assistive devices and provide as appropriate  - Encourage maximum independence but intervene and supervise when necessary  - Involve family in performance of ADLs  - Assess for home care needs following discharge   - Consider OT consult to assist with ADL evaluation and planning for discharge  - Provide patient education as appropriate  Outcome: Progressing  Goal: Maintains/Returns to pre admission functional level  Description: INTERVENTIONS:  - Perform BMAT or MOVE assessment daily    - Set and communicate daily mobility goal to care team and patient/family/caregiver  - Collaborate with rehabilitation services on mobility goals if consulted  - Perform Range of Motion   times a day  - Reposition patient every   hours  - Dangle patient   times a day  - Stand patient   times a day  - Ambulate patient   times a day  - Out of bed to chair   times a day   - Out of bed for meals     times a day  - Out of bed for toileting  - Record patient progress and toleration of activity level   Outcome: Progressing     Problem: DISCHARGE PLANNING  Goal: Discharge to home or other facility with appropriate resources  Description: INTERVENTIONS:  - Identify barriers to discharge w/patient and caregiver  - Arrange for needed discharge resources and transportation as appropriate  - Identify discharge learning needs (meds, wound care, etc )  - Arrange for interpretive services to assist at discharge as needed  - Refer to Case Management Department for coordinating discharge planning if the patient needs post-hospital services based on physician/advanced practitioner order or complex needs related to functional status, cognitive ability, or social support system  Outcome: Progressing Problem: Knowledge Deficit  Goal: Patient/family/caregiver demonstrates understanding of disease process, treatment plan, medications, and discharge instructions  Description: Complete learning assessment and assess knowledge base    Interventions:  - Provide teaching at level of understanding  - Provide teaching via preferred learning methods  Outcome: Progressing     Problem: GASTROINTESTINAL - ADULT  Goal: Minimal or absence of nausea and/or vomiting  Description: INTERVENTIONS:  - Administer IV fluids if ordered to ensure adequate hydration  - Maintain NPO status until nausea and vomiting are resolved  - Nasogastric tube if ordered  - Administer ordered antiemetic medications as needed  - Provide nonpharmacologic comfort measures as appropriate  - Advance diet as tolerated, if ordered  - Consider nutrition services referral to assist patient with adequate nutrition and appropriate food choices  Outcome: Progressing  Goal: Maintains adequate nutritional intake  Description: INTERVENTIONS:  - Monitor percentage of each meal consumed  - Identify factors contributing to decreased intake, treat as appropriate  - Assist with meals as needed  - Monitor I&O, weight, and lab values if indicated  - Obtain nutrition services referral as needed  Outcome: Progressing     Problem: METABOLIC, FLUID AND ELECTROLYTES - ADULT  Goal: Electrolytes maintained within normal limits  Description: INTERVENTIONS:  - Monitor labs and assess patient for signs and symptoms of electrolyte imbalances  - Administer electrolyte replacement as ordered  - Monitor response to electrolyte replacements, including repeat lab results as appropriate  - Instruct patient on fluid and nutrition as appropriate  Outcome: Progressing  Goal: Fluid balance maintained  Description: INTERVENTIONS:  - Monitor labs   - Monitor I/O and WT  - Instruct patient on fluid and nutrition as appropriate  - Assess for signs & symptoms of volume excess or deficit  Outcome: Progressing

## 2021-09-07 NOTE — CASE MANAGEMENT
Case Management Assessment & Discharge Planning Note    Patient name Daren Oh  Location [address] [id number] MRN [id number]  : 1934 Date 2021       Current Admission Date: 9/3/2021  Current Admission Diagnosis:  Intractable nausea and vomiting   Patient Active Problem List   Diagnosis    Acute on chronic anemia    History of transcatheter aortic valve replacement (TAVR)    Hyponatremia    Acute cystitis without hematuria    Parkinson's disease     Hiatal hernia    Mild protein-calorie malnutrition ([address] )    Thrombocytopenia (HCC)    Severe protein-calorie malnutrition    Constipation    New onset atrial fibrillation (HCC)    Chronic diastolic CHF    Paroxysmal atrial fibrillation     Intractable nausea and vomiting    Pancytopenia    Previous Admission - Discharge Date:21   LOS (days): 4  Geometric Mean LOS (GMLOS) (days): 3 70  Days to GMLOS:-0 1 Previous Discharge Diagnosis:  There are no discharge diagnoses documented for the most recent discharge  PATIENT READMITTED TO HOSPITAL  Risk of Unplanned Readmission Score  Predictive Model Details          18 (Low)  Factor Value    Calculated 2021 12:08 17% Number of active Rx orders 24    Risk of Unplanned Readmission Model 11% Number of ED visits in last six months 2     10% ECG/EKG order present in last 6 months     9% Latest calcium low (8 2 mg/dL)     8% Diagnosis of electrolyte disorder present     7% Age 80     7% Imaging order present in last 6 months     6% Latest hemoglobin low (9 0 g/dL)     6% Phosphorous result present     5% Number of hospitalizations in last year 1     5% Diagnosis of deficiency anemia present     4% Current length of stay 3 722 days     2% Charlson Comorbidity Index 2     2% Future appointment scheduled     1% Active ulcer medication Rx order present         Reason for Referral: dc planning    Pt with intractable nausea and weight loss, plan is EGD  Recommendation is for STR      CM called Saima Greene and discussed this with him-- emailed him a list of STR facility within [place] of [place] to review and recommended 4 choices  He is going to discuss with his Mother-  Pt was off having her diagnostic test-- thus list was left at the bedside  CM asked Laura Perez to speak to patient about choices  OBJECTIVE:  Pt is a 80y o  year old , white or  [1], female with Druze preference of None admitted on 9/3/2021  8:43 AM  Pt is admitted to [facility] [address] at [address] with complaints of Intractable nausea and vomiting   Current admission status: Inpatient  Referral Reason:  (dc planning)    Preferred Pharmacy:   [address] [place] [address] RT [address] [place] [address] S/C  [facility] , [address]  [facility] [address]  Phone: [phone] Fax: [phone]    Primary Care Provider: Tata Beatty    Primary Insurance: [facility]  Secondary Insurance:     ASSESSMENT:  Active Health Care Agents    There are no active Health Care Agents on file         Advance Directives  Does patient have a [address]?: Yes Israel Li is the POA and Primary )  Does patient have Advance Directives?: Yes  Advance Directives: Living will         [address] of Residence: [place]    Readmission Root Cause  30 Day Readmission: Yes  Who directed you to return to the hospital?: Family  Did you understand whom to contact if you had questions or problems?: Yes  Did you get your prescriptions before you left the hospital?: Yes  Were you able to get your prescriptions filled when you left the hospital?: Yes  Patient was readmitted due to: continue nausea  Action Plan: for GI to see and scoped    Patient Information  Mental Status: Alert  During Assessment patient was accompanied by: Not accompanied during assessment  Assessment information provided by[de-identified] Patient  Primary Caregiver: Self  Support Systems: Self  What city do you live in?: [facility] entry access options   Select all that apply : Stairs  Number of steps to enter home : 1  Type of Current Residence: [employer]  Living Arrangements: Lives Alone  Is patient a ?: No    Activities of Daily Living Prior to Admission  Functional Status: Independent  Completes ADLs independently?: Yes  Ambulates independently?: No  Level of ambulatory dependence: Assistance  Does patient use assisted devices?: Yes  Assisted Devices (DME) used:  (Rollator)  Does patient currently own DME?: Yes  What DME does the patient currently own?:  (Rollator)  Does patient have a history of Outpatient Therapy (PT/OT)?: No  Does the patient have a history of Short-Term Rehab?: No  Does patient currently have [address]?: Yes ([facility] home care)         Patient Information Continued  Income Source: SSI/SSD  Does patient have prescription coverage?: Yes  Does patient receive dialysis treatments?: No  Does patient have a history of substance abuse?: No  Does patient have a history of Mental Health Diagnosis?: No         Means of Transportation  Means of Transport to Appts[de-identified] Family transport    DISCHARGE DETAILS:    Discharge planning discussed with[de-identified] son Marjan Meza of Choice: Yes    Contacts  Patient Contacts: [phone]  Realtionship to Patient[de-identified] Family  Contact Method: Phone  Phone Number: [phone]  Reason/Outcome: Discharge Planning, Continuity of [facility]         Is the patient interested in [address] at discharge?: No    DME Referral Provided  Referral made for DME?: No    NEED CHOICES OF STR- LIST PROVIDED TO POA/ Pt            Discharge Destination Plan[de-identified] Short Term Rehab

## 2021-09-07 NOTE — ANESTHESIA PREPROCEDURE EVALUATION
Procedure:  EGD    Relevant Problems   CARDIO   (+) Chronic diastolic CHF   (+) History of transcatheter aortic valve replacement (TAVR)   (+) New onset atrial fibrillation (HCC)   (+) Paroxysmal atrial fibrillation       GI/HEPATIC   (+) Hiatal hernia      HEMATOLOGY   (+) Acute on chronic anemia   (+) Thrombocytopenia (HCC)             Anesthesia Plan  ASA Score- 3     Anesthesia Type- IV sedation with anesthesia with ASA Monitors  Additional Monitors:   Airway Plan:           Plan Factors-    Chart reviewed  Induction- intravenous  Postoperative Plan-     Informed Consent- Anesthetic plan and risks discussed with patient  I personally reviewed this patient with the CRNA  Discussed and agreed on the Anesthesia Plan with the CRNA  Romero Daivs

## 2021-09-07 NOTE — CONSULTS
Consultation - [facility]  Gacj Hood 80 y o  female MRN: [id number]  Unit/Bed#: MS 36-1 Encounter: [id number]        Consults    Reason for Consult / Principal Problem:     Intractable nausea involvement        ASSESSMENT AND PLAN:      Intractable nausea involvement  Unintentional weight loss  -given persistence of symptoms and patient being amenable to endoscopic evaluation would pursue EGD for further evaluation  -NPO since MN  -check zinc and copper as this can cause dyguesia  -artificial saliva  -maybe secondary to her Parkinson's medications vs CHF vs GB sludge(poor surgical candidate)     Anemia  -suspect hemolysis given peripheral smear findings of schistocytes and helmet cells as well as elevated LDH, elevated reticulocyte count and indirect hyperbilirubinemia  -concerned that this may be secondary to complications from her recent TAVR; given elevated BNP consider echocardiogram for further evaluation  -consider Hematology evaluation    Elevated liver enzymes  -AST:ALT > 2; cholestatic pattern  -no history of heavy alcohol use; chronic hepatitis panel negative, no evidence of hemochromatosis(iron saturation and ferritin likely elevated in the setting of recent blood transfusions) normal TSH   -RUQ US with layering sludge without calculi and no intrahepatic biliary duct dilation; liver parenchyma with normal echogenicity and few subcentimeter hepatic cysts   -this may be secondary to congestive hepatopathy, medication effect (Parkinson's medications), her current anorexia, GB sludge  -celiac panel pending, check MANAN, ASMA, IgG, creatine kinase  -continue to monitor            ______________________________________________________________________    HPI:  58-year-old female seen in consultation for intractable nausea and vomiting with decreased appetite and unintentional weight loss    She was previously evaluated for this on her last admission on 8/9/21 and at that time she decided to defer endoscopic evaluation given her recent TAVR  It was thought at that time symptoms were likely secondary to medications particularly antibiotics that she was given for UTIs  She was given antiemetics and began to tolerated diet and was discharged home  She also had anemia requiring transfusions without overt signs of bleeding and this was possibly secondary to a hematoma that was noticed the left quadrant/left flank  She had never undergone a colonoscopy  Unfortunately her symptoms have not improved  She continues to remain nauseous with dry heaving, weight loss but denies any abdominal pain or blood in stool  She has lost weight and is increasingly getting weaker  REVIEW OF SYSTEMS:    CONSTITUTIONAL: Denies any fever, chills, rigors, and weight loss  HEENT: No earache or tinnitus  Denies hearing loss or visual disturbances  CARDIOVASCULAR: No chest pain or palpitations  RESPIRATORY: Denies any cough, hemoptysis, shortness of breath or dyspnea on exertion  GASTROINTESTINAL: As noted in the History of Present Illness  GENITOURINARY: No problems with urination  Denies any hematuria or dysuria  NEUROLOGIC: No dizziness or vertigo, denies headaches  MUSCULOSKELETAL: Denies any muscle or joint pain  SKIN: Denies skin rashes or itching  ENDOCRINE: Denies excessive thirst  Denies intolerance to heat or cold  PSYCHOSOCIAL: Denies depression or anxiety  Denies any recent memory loss         Historical Information   Past Medical History:   Diagnosis Date    Diastolic congestive heart failure (HCC)     [facility]     History of transcatheter aortic valve replacement (TAVR)     MI [facility] infarction) [facility] )     Parkinson's disease (HCC)     Paroxysmal atrial fibrillation (HCC)     UTI (urinary tract infection)      Past Surgical History:   Procedure Laterality Date    BREAST SURGERY      CARDIAC SURGERY      HYSTERECTOMY      REPLACEMENT AORTIC VALVE TRANSCATHETER (TAVR)      TUBAL LIGATION       Social History   Social History     Substance and Sexual Activity   Alcohol Use Not Currently     Social History     Substance and Sexual Activity   Drug Use Not Currently     Social History     Tobacco Use   Smoking Status Never Smoker   Smokeless Tobacco Never Used     Family History   Problem Relation Age of Onset    Cancer Mother     Alzheimer's disease Mother     Heart disease Father        Meds/Allergies     Medications Prior to Admission   Medication    aspirin (ECOTRIN LOW STRENGTH) 81 mg EC tablet    atorvastatin (LIPITOR) 80 mg tablet    carbidopa-levodopa (SINEMET CR)  mg per tablet    cholecalciferol (VITAMIN D3) 1,000 units tablet    metoprolol tartrate (LOPRESSOR) 25 mg tablet    pantoprazole (PROTONIX) 40 mg tablet    polyethylene glycol (MIRALAX) 17 g packet    Safinamide Mesylate (Xadago) 100 MG TABS    senna-docusate sodium (SENOKOT S) 8 6-50 mg per tablet    vitamin B-12 (VITAMIN B-12) 1,000 mcg tablet     Current Facility-Administered Medications   Medication Dose Route Frequency    acetaminophen (TYLENOL) tablet 650 mg  650 mg Oral Q6H PRN    ALPRAZolam (XANAX) tablet 0 25 mg  0 25 mg Oral TID PRN    aspirin (ECOTRIN LOW STRENGTH) EC tablet 81 mg  81 mg Oral Daily    atorvastatin (LIPITOR) tablet 80 mg  80 mg Oral Daily    carbidopa-levodopa (SINEMET CR)  mg per ER tablet 1 tablet  1 tablet Oral 5x Daily    metoclopramide (REGLAN) injection 10 mg  10 mg Intravenous Q6H PRN    metoprolol tartrate (LOPRESSOR) tablet 25 mg  25 mg Oral Q12H SWATI    pantoprazole (PROTONIX) injection 40 mg  40 mg Intravenous Q12H SWATI    pneumococcal 13-valent conjugate vaccine (PREVNAR-13) IM injection 0 5 mL  0 5 mL Intramuscular Prior to discharge    polyethylene glycol (MIRALAX) packet 17 g  17 g Oral Daily    Safinamide Mesylate TABS 100 mg  100 mg Oral Daily    saliva substitute (MOUTH KOTE) mucosal solution 5 spray  5 spray Mouth/Throat 4x Daily PRN    senna-docusate sodium (SENOKOT S) 8 6-50 mg per tablet 1 tablet  1 tablet Oral HS    trimethobenzamide (TIGAN) IM injection 200 mg  200 mg Intramuscular Q8H PRN       Allergies   Allergen Reactions    Amoxicillin Diarrhea    Ciprofloxacin Vomiting    Erythromycin Diarrhea    Plavix [Clopidogrel] GI Intolerance           Objective     Blood pressure 152/71, pulse 71, temperature 98 °F (36 7 °C), resp  rate 18, height 5' 5" (1 651 m), weight 44 7 kg (98 lb 8 7 oz), SpO2 98 %  Body mass index is 16 4 kg/m²  Intake/Output Summary (Last 24 hours) at 9/7/2021 1039  Last data filed at 9/7/2021 1021  Gross per 24 hour   Intake 120 ml   Output 650 ml   Net -530 ml         PHYSICAL EXAM:      General Appearance:   Alert, cooperative, no distress   HEENT:   Normocephalic, atraumatic, anicteric      Neck:  Supple, symmetrical, trachea midline   Lungs:   respirations unlabored    Heart[de-identified]   Regular rate   Abdomen:   Soft, non-tender, non-distended;    Genitalia:   Deferred    Rectal:   Deferred    Extremities:  No cyanosis, clubbing or edema        Skin:  No jaundice, rashes, or lesions    Lymph nodes:  No palpable cervical lymphadenopathy        Lab Results:   No results displayed because visit has over 200 results  Imaging Studies: I have personally reviewed pertinent imaging studies

## 2021-09-07 NOTE — ASSESSMENT & PLAN NOTE
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

## 2021-09-07 NOTE — PROGRESS NOTES
[address]  Progress Note Alexus Polk 1934, 80 y o  female MRN: [id number]  Unit/Bed#: -01 Encounter: [id number]  Primary Care Provider: Carlton Frazier   Date and time admitted to hospital: 9/3/2021  8:43 AM    Pancytopenia  Assessment & Plan    · Patient has ongoing anemia and also thrombocytopenia and leukopenia has resolved  Suspect patient has hemolytic anemia  Previous Hemoccult was negative no iron deficiency evident on the iron panel on last admission  Vitamin B12 is supratherapeutic  · She is getting an EGD today secondary to persistent nausea and vomiting  · No evidence of bleed  · LDH has been elevated so T bili has increased today negative for any obstruction on the ultrasound of the right upper quadrant    Haptoglobin is pending but she is positive for helmet and schistocytes on hemolysis screen  · Status post 1 unit of PRBC hemoglobin improved to 9  · Will monitor closely  · Will do an autoimmune profile of MANAN RF  · Will monitor ldh cmp and cbc in am   · Will need heme onc     Paroxysmal atrial fibrillation   Assessment & Plan  Rate controlled on Lopressor - c/w ASA - not on chronic anticoagulation - outpatient follow-up with cardiology    Chronic diastolic CHF  Assessment & Plan  Continue beta-blockade with Lopressor - not chronically on diuretics  Echocardiogram @ [address] in June 2021 w/ an EF of 60-65%    Severe protein-calorie malnutrition  Assessment & Plan  BMI of 16 4 in the setting of decreased appetite/oral intake and evidence of muscle wasting/atrophy on exam  Initiated nutritional supplementation with meals    Parkinson's disease   Assessment & Plan  C/w Sinemet/Xadago  PT/OT as tolerated    History of transcatheter aortic valve replacement (TAVR)  Assessment & Plan  History of TAVR @ [address] [place] [address] recently on July 8805 with 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  Saw CT surgery on 8/31/21 w/ recommendation to stop Plavix due to patient complaints of GI upset  Serial outpatient followups    * Intractable nausea and vomiting  Assessment & Plan  Recurrent admission for intractable nausea/vomiting - had recent TAVR in 2021 w/ subsequent hospitalization in early August for nausea/vomiting w/ transient resolution - now returns with complaints of recurrent nausea/vomiting and associated poor appetite over the last few weeks  egd today no abdominal pain   C/w IV PPI regimen -  PRN emesis control  Continues to complain of nausea and poor oral intake    Hyperbilirubinemia-resolved as of 2021  Assessment & Plan  Nonspecific - adominal ultrasound without pathology - serum bilirubin level downtrending on IV fluids    Results from last 7 days   Lab Units 21  0513 21  0503 21  0600 21  0859   TOTAL BILIRUBIN mg/dL 2 37* 1 58* 2 14* 2 47*             VTE Pharmacologic Prophylaxis: VTE Score: 5 scd    Patient Centered Rounds: I performed bedside rounds with nursing staff today  Discussions with Specialists or Other Care Team Provider:  Will discuss with Gastroenterology    Education and Discussions with Family / Patient: patient will update family     Time Spent for Care: 30 minutes  More than 50% of total time spent on counseling and coordination of care as described above  Current Length of Stay: 4 day(s)  Current Patient Status: Inpatient   Certification Statement: The patient will continue to require additional inpatient hospital stay due to Secondary to hemolytic anemia persistent nausea and vomiting  Discharge Plan: Anticipate discharge in 48 hrs to discharge location to be determined pending rehab evaluations      Code Status: Level 3 - DNAR and DNI    Subjective:   Patient seen and examined feels Carmelo Jesus denies any chest pain or shortness of breath but still has nausea and leg cramps    Objective:     Vitals:   Temp (24hrs), Av 1 °F (36 7 °C), Min:97 9 °F (36 6 °C), Max:98 3 °F (36 8 °C)    Temp:  [97 9 °F (36 6 °C)-98 3 °F (36 8 °C)] 98 °F (36 7 °C)  HR:  [60-75] 71  Resp:  [18] 18  BP: (125-155)/(60-71) 152/71  SpO2:  [93 %-99 %] 98 %  Body mass index is 16 4 kg/m²  Input and Output Summary (last 24 hours): Intake/Output Summary (Last 24 hours) at 9/7/2021 1130  Last data filed at 9/7/2021 1021  Gross per 24 hour   Intake 120 ml   Output 650 ml   Net -530 ml       Physical Exam:   Physical Exam  Vitals and nursing note reviewed  Constitutional:       General: She is not in acute distress  Appearance: She is well-developed  HENT:      Head: Normocephalic and atraumatic  Eyes:      Conjunctiva/sclera: Conjunctivae normal    Cardiovascular:      Rate and Rhythm: Normal rate and regular rhythm  Heart sounds: No murmur heard  Pulmonary:      Effort: Pulmonary effort is normal  No respiratory distress  Breath sounds: Normal breath sounds  No wheezing or rales  Abdominal:      General: There is no distension  Palpations: Abdomen is soft  Tenderness: There is no abdominal tenderness  Musculoskeletal:      Cervical back: Neck supple  Skin:     General: Skin is warm and dry  Neurological:      General: No focal deficit present  Mental Status: She is alert and oriented to person, place, and time  Mental status is at baseline     Psychiatric:         Mood and Affect: Mood normal          Additional Data:     Labs:  Results from last 7 days   Lab Units 09/07/21  0513 09/06/21  0551 09/03/21  0859   WBC Thousand/uL 4 38 3 12* 3 52*   HEMOGLOBIN g/dL 9 0* 9 3* 10 0*   HEMATOCRIT % 27 6* 28 0* 31 0*   PLATELETS Thousands/uL 74* 84* 107*   NEUTROS PCT %  --   --  72   LYMPHS PCT %  --   --  16   LYMPHO PCT %  --  14  --    MONOS PCT %  --   --  9   MONO PCT %  --  10  --    EOS PCT %  --  0 1     Results from last 7 days   Lab Units 09/07/21  0513   SODIUM mmol/L 139   POTASSIUM mmol/L 4 0   CHLORIDE mmol/L 102   CO2 mmol/L 28   BUN mg/dL 17   CREATININE mg/dL 0 70   ANION GAP mmol/L 9   CALCIUM mg/dL 8 2*   ALBUMIN g/dL 3 0*   TOTAL BILIRUBIN mg/dL 2 37*   ALK PHOS U/L 129*   ALT U/L 8*   AST U/L 54*   GLUCOSE RANDOM mg/dL 64*         Results from last 7 days   Lab Units 09/07/21  0753 09/07/21  0717   POC GLUCOSE mg/dl 131 61*         Results from last 7 days   Lab Units 09/03/21  0859   LACTIC ACID mmol/L 1 2       Lines/Drains:  Invasive Devices     Peripheral Intravenous Line            Peripheral IV 09/07/21 Right Antecubital <1 day                      Imaging: Reviewed radiology reports from this admission including: abdominal/pelvic CT    Recent Cultures (last 7 days):         Last 24 Hours Medication List:   Current Facility-Administered Medications   Medication Dose Route Frequency Provider Last Rate    acetaminophen  650 mg Oral Q6H PRN Arin Spann, DO      ALPRAZolam  0 25 mg Oral TID PRN Harley Gomez PA-C      aspirin  81 mg Oral Daily Tejinder Luong, DO      atorvastatin  80 mg Oral Daily Tejinder Luong, DO      carbidopa-levodopa  1 tablet Oral 5x Daily Tejinder Jamaal, DO      metoclopramide  10 mg Intravenous Q6H PRN Arin Spann,       metoprolol tartrate  25 mg Oral Q12H [facility], [facility]      pantoprazole  40 mg Intravenous Q12H [facility], DO      pneumococcal 13-valent conjugate vaccine  0 5 mL Intramuscular Prior to discharge Indira Bey PA-C      polyethylene glycol  17 g Oral Daily Tejinder Hinton, DO      Safinamide Mesylate  100 mg Oral Daily [facility], DO      saliva substitute  5 spray Mouth/Throat 4x Daily PRN JADA Conroy      senna-docusate sodium  1 tablet Oral HS Tejinder Hinton, DO      trimethobenzamide  200 mg Intramuscular Q8H PRN JADA Moore          Today, Patient Was Seen By: Shaye Santo MD    **Please Note: This note may have been constructed using a voice recognition system  **

## 2021-09-07 NOTE — PHYSICAL THERAPY NOTE
PHYSICAL THERAPY NOTE          Patient Name: Latonya Noonan  HGKTB'L Date: 9/7/2021  Chart reviewed  Pt currently getting EGD procedure done  Will continue to follow and see as appropriate      Suzie Hunt PTA

## 2021-09-07 NOTE — NURSING NOTE
Returned from EGD, offers no complaints  Denies nausea or pain  Lungs clear on ra, +bs, abdomen soft, nontender  Tolerating liquids  Will continue to monitor  Call bell in reach  Family at bedside

## 2021-09-07 NOTE — PHYSICAL THERAPY NOTE
PHYSICAL THERAPY NOTE          Patient Name: Marcus Anderson  RRCWW'T Date: 9/7/2021  Chart reviewed  Pt refusing at this time due to fatigue and overall not feeling well  Will continue to follow and attempt to see at schedule allows       Carine Morris PTA

## 2021-09-07 NOTE — ASSESSMENT & PLAN NOTE
Recurrent admission for intractable nausea/vomiting - had recent TAVR in July 2021 w/ subsequent hospitalization in early August for nausea/vomiting w/ transient resolution - now returns with complaints of recurrent nausea/vomiting and associated poor appetite over the last few weeks  egd today no abdominal pain   C/w IV PPI regimen -  PRN emesis control  Continues to complain of nausea and poor oral intake

## 2021-09-07 NOTE — ASSESSMENT & PLAN NOTE
· Patient has ongoing anemia and also thrombocytopenia and leukopenia has resolved  Suspect patient has hemolytic anemia  Previous Hemoccult was negative no iron deficiency evident on the iron panel on last admission  Vitamin B12 is supratherapeutic  · She is getting an EGD today secondary to persistent nausea and vomiting  · No evidence of bleed  · LDH has been elevated so T bili has increased today negative for any obstruction on the ultrasound of the right upper quadrant    Haptoglobin is pending but she is positive for helmet and schistocytes on hemolysis screen  · Status post 1 unit of PRBC hemoglobin improved to 9  · Will monitor closely  · Will do an autoimmune profile of MANAN RF  · Will monitor ldh cmp and cbc in am   · Will need heme onc

## 2021-09-08 ENCOUNTER — APPOINTMENT (INPATIENT)
Dept: CT IMAGING | Facility: HOSPITAL | Age: 86
DRG: 808 | End: 2021-09-08
Payer: COMMERCIAL

## 2021-09-08 PROBLEM — D59.9 ACUTE HEMOLYTIC ANEMIA (HCC): Status: ACTIVE | Noted: 2021-09-08

## 2021-09-08 PROBLEM — K82.8 GALLBLADDER SLUDGE: Status: ACTIVE | Noted: 2021-09-08

## 2021-09-08 LAB
ALBUMIN SERPL BCP-MCNC: 2.8 G/DL (ref 3.5–5)
ALP SERPL-CCNC: 113 U/L (ref 46–116)
ALT SERPL W P-5'-P-CCNC: <6 U/L (ref 12–78)
ANION GAP SERPL CALCULATED.3IONS-SCNC: 8 MMOL/L (ref 4–13)
APTT PPP: 28 SECONDS (ref 23–37)
APTT PPP: 29 SECONDS (ref 23–37)
AST SERPL W P-5'-P-CCNC: 52 U/L (ref 5–45)
BACTERIA UR QL AUTO: ABNORMAL /HPF
BASOPHILS # BLD AUTO: 0.02 THOUSANDS/ΜL (ref 0–0.1)
BASOPHILS NFR BLD AUTO: 0 % (ref 0–1)
BILIRUB DIRECT SERPL-MCNC: 0.48 MG/DL (ref 0–0.2)
BILIRUB SERPL-MCNC: 1.82 MG/DL (ref 0.2–1)
BILIRUB UR QL STRIP: ABNORMAL
BUN SERPL-MCNC: 17 MG/DL (ref 5–25)
CALCIUM ALBUM COR SERPL-MCNC: 9.1 MG/DL (ref 8.3–10.1)
CALCIUM SERPL-MCNC: 8.1 MG/DL (ref 8.3–10.1)
CHLORIDE SERPL-SCNC: 101 MMOL/L (ref 100–108)
CLARITY UR: CLEAR
CO2 SERPL-SCNC: 28 MMOL/L (ref 21–32)
COLOR UR: ABNORMAL
CREAT SERPL-MCNC: 0.62 MG/DL (ref 0.6–1.3)
D DIMER PPP FEU-MCNC: 2.46 UG/ML FEU
ENDOMYSIUM IGA SER QL: NEGATIVE
EOSINOPHIL # BLD AUTO: 0.04 THOUSAND/ΜL (ref 0–0.61)
EOSINOPHIL NFR BLD AUTO: 1 % (ref 0–6)
ERYTHROCYTE [DISTWIDTH] IN BLOOD BY AUTOMATED COUNT: 20.4 % (ref 11.6–15.1)
ERYTHROCYTE [DISTWIDTH] IN BLOOD BY AUTOMATED COUNT: 20.9 % (ref 11.6–15.1)
FIBRINOGEN PPP-MCNC: 149 MG/DL (ref 227–495)
FIBRINOGEN PPP-MCNC: 219 MG/DL (ref 227–495)
GFR SERPL CREATININE-BSD FRML MDRD: 82 ML/MIN/1.73SQ M
GLIADIN PEPTIDE IGA SER-ACNC: 3 UNITS (ref 0–19)
GLIADIN PEPTIDE IGG SER-ACNC: 2 UNITS (ref 0–19)
GLUCOSE SERPL-MCNC: 68 MG/DL (ref 65–140)
GLUCOSE UR STRIP-MCNC: NEGATIVE MG/DL
HAPTOGLOB SERPL-MCNC: <10 MG/DL (ref 41–333)
HCT VFR BLD AUTO: 24.9 % (ref 34.8–46.1)
HCT VFR BLD AUTO: 25 % (ref 34.8–46.1)
HGB BLD-MCNC: 8 G/DL (ref 11.5–15.4)
HGB BLD-MCNC: 8.2 G/DL (ref 11.5–15.4)
HGB UR QL STRIP.AUTO: ABNORMAL
IGA SERPL-MCNC: 198 MG/DL (ref 64–422)
IMM GRANULOCYTES # BLD AUTO: 0.04 THOUSAND/UL (ref 0–0.2)
IMM GRANULOCYTES NFR BLD AUTO: 1 % (ref 0–2)
INR PPP: 1.01 (ref 0.84–1.19)
INR PPP: 1.11 (ref 0.84–1.19)
KETONES UR STRIP-MCNC: ABNORMAL MG/DL
LDH SERPL-CCNC: 1498 U/L (ref 81–234)
LEUKOCYTE ESTERASE UR QL STRIP: ABNORMAL
LYMPHOCYTES # BLD AUTO: 0.36 THOUSANDS/ΜL (ref 0.6–4.47)
LYMPHOCYTES NFR BLD AUTO: 4 % (ref 14–44)
MCH RBC QN AUTO: 30.3 PG (ref 26.8–34.3)
MCH RBC QN AUTO: 30.6 PG (ref 26.8–34.3)
MCHC RBC AUTO-ENTMCNC: 32 G/DL (ref 31.4–37.4)
MCHC RBC AUTO-ENTMCNC: 32.9 G/DL (ref 31.4–37.4)
MCV RBC AUTO: 93 FL (ref 82–98)
MCV RBC AUTO: 95 FL (ref 82–98)
MONOCYTES # BLD AUTO: 0.54 THOUSAND/ΜL (ref 0.17–1.22)
MONOCYTES NFR BLD AUTO: 6 % (ref 4–12)
MUCOUS THREADS UR QL AUTO: ABNORMAL
NEUTROPHILS # BLD AUTO: 7.81 THOUSANDS/ΜL (ref 1.85–7.62)
NEUTS SEG NFR BLD AUTO: 88 % (ref 43–75)
NITRITE UR QL STRIP: NEGATIVE
NON-SQ EPI CELLS URNS QL MICRO: ABNORMAL /HPF
NRBC BLD AUTO-RTO: 0 /100 WBCS
NRBC BLD AUTO-RTO: 0 /100 WBCS
PH UR STRIP.AUTO: 6.5 [PH]
PLATELET # BLD AUTO: 63 THOUSANDS/UL (ref 149–390)
PLATELET # BLD AUTO: 66 THOUSANDS/UL (ref 149–390)
POTASSIUM SERPL-SCNC: 3.4 MMOL/L (ref 3.5–5.3)
PROT SERPL-MCNC: 4.9 G/DL (ref 6.4–8.2)
PROT UR STRIP-MCNC: ABNORMAL MG/DL
PROTHROMBIN TIME: 13.2 SECONDS (ref 11.6–14.5)
PROTHROMBIN TIME: 14.2 SECONDS (ref 11.6–14.5)
RBC # BLD AUTO: 2.64 MILLION/UL (ref 3.81–5.12)
RBC # BLD AUTO: 2.68 MILLION/UL (ref 3.81–5.12)
RBC #/AREA URNS AUTO: ABNORMAL /HPF
SODIUM SERPL-SCNC: 137 MMOL/L (ref 136–145)
SP GR UR STRIP.AUTO: 1.02 (ref 1–1.03)
TTG IGA SER-ACNC: <2 U/ML (ref 0–3)
TTG IGG SER-ACNC: <2 U/ML (ref 0–5)
UROBILINOGEN UR QL STRIP.AUTO: 4 E.U./DL
WBC # BLD AUTO: 2.81 THOUSAND/UL (ref 4.31–10.16)
WBC # BLD AUTO: 8.81 THOUSAND/UL (ref 4.31–10.16)
WBC #/AREA URNS AUTO: ABNORMAL /HPF

## 2021-09-08 PROCEDURE — 82248 BILIRUBIN DIRECT: CPT | Performed by: FAMILY MEDICINE

## 2021-09-08 PROCEDURE — 85610 PROTHROMBIN TIME: CPT | Performed by: FAMILY MEDICINE

## 2021-09-08 PROCEDURE — 99233 SBSQ HOSP IP/OBS HIGH 50: CPT

## 2021-09-08 PROCEDURE — 85384 FIBRINOGEN ACTIVITY: CPT | Performed by: FAMILY MEDICINE

## 2021-09-08 PROCEDURE — 71250 CT THORAX DX C-: CPT

## 2021-09-08 PROCEDURE — 80053 COMPREHEN METABOLIC PANEL: CPT | Performed by: FAMILY MEDICINE

## 2021-09-08 PROCEDURE — 86039 ANTINUCLEAR ANTIBODIES (ANA): CPT | Performed by: FAMILY MEDICINE

## 2021-09-08 PROCEDURE — G1004 CDSM NDSC: HCPCS

## 2021-09-08 PROCEDURE — 83615 LACTATE (LD) (LDH) ENZYME: CPT | Performed by: FAMILY MEDICINE

## 2021-09-08 PROCEDURE — 86038 ANTINUCLEAR ANTIBODIES: CPT | Performed by: FAMILY MEDICINE

## 2021-09-08 PROCEDURE — 30233M1 TRANSFUSION OF NONAUTOLOGOUS PLASMA CRYOPRECIPITATE INTO PERIPHERAL VEIN, PERCUTANEOUS APPROACH: ICD-10-PCS | Performed by: FAMILY MEDICINE

## 2021-09-08 PROCEDURE — 99233 SBSQ HOSP IP/OBS HIGH 50: CPT | Performed by: FAMILY MEDICINE

## 2021-09-08 PROCEDURE — P9012 CRYOPRECIPITATE EACH UNIT: HCPCS

## 2021-09-08 PROCEDURE — 85730 THROMBOPLASTIN TIME PARTIAL: CPT | Performed by: FAMILY MEDICINE

## 2021-09-08 PROCEDURE — 85027 COMPLETE CBC AUTOMATED: CPT | Performed by: FAMILY MEDICINE

## 2021-09-08 PROCEDURE — 74176 CT ABD & PELVIS W/O CONTRAST: CPT

## 2021-09-08 PROCEDURE — 85379 FIBRIN DEGRADATION QUANT: CPT | Performed by: FAMILY MEDICINE

## 2021-09-08 PROCEDURE — 81001 URINALYSIS AUTO W/SCOPE: CPT | Performed by: FAMILY MEDICINE

## 2021-09-08 PROCEDURE — 86430 RHEUMATOID FACTOR TEST QUAL: CPT | Performed by: FAMILY MEDICINE

## 2021-09-08 RX ORDER — ACETAMINOPHEN 325 MG/1
650 TABLET ORAL EVERY 6 HOURS PRN
Status: CANCELLED | OUTPATIENT
Start: 2021-09-08

## 2021-09-08 RX ORDER — MIRTAZAPINE 15 MG/1
7.5 TABLET, FILM COATED ORAL
Status: CANCELLED | OUTPATIENT
Start: 2021-09-08

## 2021-09-08 RX ORDER — POLYETHYLENE GLYCOL 3350 17 G/17G
17 POWDER, FOR SOLUTION ORAL DAILY
Status: CANCELLED | OUTPATIENT
Start: 2021-09-09

## 2021-09-08 RX ORDER — AMOXICILLIN 250 MG
1 CAPSULE ORAL
Status: CANCELLED | OUTPATIENT
Start: 2021-09-08

## 2021-09-08 RX ORDER — ALPRAZOLAM 0.25 MG/1
0.25 TABLET ORAL 3 TIMES DAILY PRN
Status: CANCELLED | OUTPATIENT
Start: 2021-09-08

## 2021-09-08 RX ORDER — XYLITOL/YERBA SANTA
5 AEROSOL, SPRAY WITH PUMP (ML) MUCOUS MEMBRANE 4 TIMES DAILY PRN
Status: CANCELLED | OUTPATIENT
Start: 2021-09-08

## 2021-09-08 RX ORDER — METOCLOPRAMIDE HYDROCHLORIDE 5 MG/ML
10 INJECTION INTRAMUSCULAR; INTRAVENOUS EVERY 6 HOURS PRN
Status: CANCELLED | OUTPATIENT
Start: 2021-09-08

## 2021-09-08 RX ORDER — CARBIDOPA AND LEVODOPA 50; 200 MG/1; MG/1
1 TABLET, EXTENDED RELEASE ORAL
Status: CANCELLED | OUTPATIENT
Start: 2021-09-08

## 2021-09-08 RX ORDER — ATORVASTATIN CALCIUM 40 MG/1
80 TABLET, FILM COATED ORAL DAILY
Status: CANCELLED | OUTPATIENT
Start: 2021-09-09

## 2021-09-08 RX ORDER — PANTOPRAZOLE SODIUM 40 MG/1
40 TABLET, DELAYED RELEASE ORAL
Status: CANCELLED | OUTPATIENT
Start: 2021-09-09

## 2021-09-08 RX ADMIN — SAFINAMIDE MESYLATE 100 MG: 100 TABLET, FILM COATED ORAL at 08:05

## 2021-09-08 RX ADMIN — BISACODYL 10 MG: 5 TABLET, COATED ORAL at 11:54

## 2021-09-08 RX ADMIN — DOCUSATE SODIUM AND SENNOSIDES 1 TABLET: 8.6; 5 TABLET ORAL at 21:38

## 2021-09-08 RX ADMIN — METOCLOPRAMIDE HYDROCHLORIDE 10 MG: 5 INJECTION INTRAMUSCULAR; INTRAVENOUS at 14:51

## 2021-09-08 RX ADMIN — METOPROLOL TARTRATE 25 MG: 25 TABLET, FILM COATED ORAL at 20:35

## 2021-09-08 RX ADMIN — MIRTAZAPINE 7.5 MG: 15 TABLET, FILM COATED ORAL at 21:38

## 2021-09-08 RX ADMIN — CARBIDOPA AND LEVODOPA 1 TABLET: 50; 200 TABLET, EXTENDED RELEASE ORAL at 14:45

## 2021-09-08 RX ADMIN — Medication 5 SPRAY: at 12:42

## 2021-09-08 RX ADMIN — CARBIDOPA AND LEVODOPA 1 TABLET: 50; 200 TABLET, EXTENDED RELEASE ORAL at 11:54

## 2021-09-08 RX ADMIN — ASPIRIN 81 MG: 81 TABLET, COATED ORAL at 08:05

## 2021-09-08 RX ADMIN — CARBIDOPA AND LEVODOPA 1 TABLET: 50; 200 TABLET, EXTENDED RELEASE ORAL at 19:41

## 2021-09-08 RX ADMIN — ATORVASTATIN CALCIUM 80 MG: 40 TABLET, FILM COATED ORAL at 08:05

## 2021-09-08 RX ADMIN — TRIMETHOBENZAMIDE HYDROCHLORIDE 200 MG: 100 INJECTION INTRAMUSCULAR at 12:47

## 2021-09-08 RX ADMIN — PANTOPRAZOLE SODIUM 40 MG: 40 TABLET, DELAYED RELEASE ORAL at 05:27

## 2021-09-08 RX ADMIN — CARBIDOPA AND LEVODOPA 1 TABLET: 50; 200 TABLET, EXTENDED RELEASE ORAL at 05:27

## 2021-09-08 RX ADMIN — POLYETHYLENE GLYCOL 3350 17 G: 17 POWDER, FOR SOLUTION ORAL at 08:05

## 2021-09-08 RX ADMIN — CARBIDOPA AND LEVODOPA 1 TABLET: 50; 200 TABLET, EXTENDED RELEASE ORAL at 00:41

## 2021-09-08 NOTE — QUICK NOTE
Patient CT of the abdomen and pelvis reveals acute cholecystitis when patient initially presented to the hospital she had a right upper quadrant ultrasound which did not reveal any evidence of CBD obstructions no cholelithiasis or cholecystitis just slight she does have unconjugated hyperbilirubinemia which is compliant with her hemolytic anemia  No abdominal tenderness LFTs are low    No fevers although has had intermittent leukopenia and nausea which has now resolved nausea clinically does not look like acute cholecystitis will ask surgery to evaluate and comment as to studies have discrepancy also will do HIDA scan with CCK to tell pressure

## 2021-09-08 NOTE — ASSESSMENT & PLAN NOTE
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

## 2021-09-08 NOTE — ASSESSMENT & PLAN NOTE
· Although has been progressive previously was in the 100,000 she presented with lower and continued to decline today to 66,000 I did notice some purpura/petechiae on her back and her chest   She reports no gum bleeding  There is no gross bleeding  She does have hemolytic anemia and also with her studies is compliant with DIC with fibrinogen being low schistocytes home will cells questionable if it is TTP unknown cause at this time even of DIC  No evidence of infection will workup further obtain a UA which I reviewed no evidence of UTI do a CT chest abdomen and pelvis she has been afebrile  · With TTP patients do have some associated GI symptoms of nausea and vomiting 45% she does not have any renal failure hematuria or neurological symptoms at this time  · I have discussed with [facility] hematology will transfuse 2 products of cryoprecipitate will repeat a fibrinogen 1 hour post that and then repeat it today in the afternoon will schedule for 1400 DIC panel along with CBC  Will hold aspirin well that is being worked up  · On the previous admission patient was not on any heparin products this admission she was initially on Lovenox products for 2 days  · [place] stopped for now  · After discussion with Dr Usha Paige- hematology at [facility] after the labs in the afternoon decision was made to transfer the patient to [facility] to have Hematology evaluate daily and do further diagnostics and management if needed as no Hematology available here  Discussed with patient and son okay with it    Patient has been accepted by jenny Hancock

## 2021-09-08 NOTE — PROGRESS NOTES
Pt to be NPO after midnight  Currently on surgical soft diet/lite meals  Very poor intake  Says she consumed "1 tablespoon" of each food item on breakfast tray and became full  Too full to eat lunch  Pt also reports foods either taste too sweet or too metallic  Will order plastic silverware to help limit metallic taste  Will order small portions at meals given hiatal hernia with [place] yogurt and fruit as snacks in between meals  Consider IVF given low intake at this time  Replete K as medically appropriate, currently 3 4  Can maintain ensure clear though pt does not seem interested in any supplements due to taste changes  Pt with acute severe malnutrition, low BMI  Inadequate intake since admission (x 5 days), may need to consider nutrition support

## 2021-09-08 NOTE — ASSESSMENT & PLAN NOTE
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

## 2021-09-08 NOTE — PLAN OF CARE
Problem: Potential for Falls  Goal: Patient will remain free of falls  Description: INTERVENTIONS:  - Educate patient/family on patient safety including physical limitations  - Instruct patient to call for assistance with activity   - Consult OT/PT to assist with strengthening/mobility   - Keep Call bell within reach  - Keep bed low and locked with side rails adjusted as appropriate  - Keep care items and personal belongings within reach  - Initiate and maintain comfort rounds  - Make Fall Risk Sign visible to staff    - Apply yellow socks and bracelet for high fall risk patients  - Consider moving patient to room near nurses station  Outcome: Progressing     Problem: MOBILITY - ADULT  Goal: Maintain or return to baseline ADL function  Description: INTERVENTIONS:  -  Assess patient's ability to carry out ADLs; assess patient's baseline for ADL function and identify physical deficits which impact ability to perform ADLs (bathing, care of mouth/teeth, toileting, grooming, dressing, etc )  - Assess/evaluate cause of self-care deficits   - Assess range of motion  - Assess patient's mobility; develop plan if impaired  - Assess patient's need for assistive devices and provide as appropriate  - Encourage maximum independence but intervene and supervise when necessary  - Involve family in performance of ADLs  - Assess for home care needs following discharge   - Consider OT consult to assist with ADL evaluation and planning for discharge  - Provide patient education as appropriate  Outcome: Progressing  Goal: Maintains/Returns to pre admission functional level  Description: INTERVENTIONS:  - Perform BMAT or MOVE assessment daily    - Set and communicate daily mobility goal to care team and patient/family/caregiver     - Collaborate with rehabilitation services on mobility goals if consulted    - Out of bed for toileting  - Record patient progress and toleration of activity level   Outcome: Progressing     Problem: Nutrition/Hydration-ADULT  Goal: Nutrient/Hydration intake appropriate for improving, restoring or maintaining nutritional needs  Description: Monitor and assess patient's nutrition/hydration status for malnutrition  Collaborate with interdisciplinary team and initiate plan and interventions as ordered  Monitor patient's weight and dietary intake as ordered or per policy  Utilize nutrition screening tool and intervene as necessary  Determine patient's food preferences and provide high-protein, high-caloric foods as appropriate       INTERVENTIONS:  - Monitor oral intake, urinary output, labs, and treatment plans  - Assess nutrition and hydration status and recommend course of action  - Evaluate amount of meals eaten  - Assist patient with eating if necessary   - Allow adequate time for meals  - Recommend/ encourage appropriate diets, oral nutritional supplements, and vitamin/mineral supplements  - Order, calculate, and assess calorie counts as needed  - Recommend, monitor, and adjust tube feedings and TPN/PPN based on assessed needs  - Assess need for intravenous fluids  - Provide specific nutrition/hydration education as appropriate  - Include patient/family/caregiver in decisions related to nutrition  Outcome: Progressing     Problem: PAIN - ADULT  Goal: Verbalizes/displays adequate comfort level or baseline comfort level  Description: Interventions:  - Encourage patient to monitor pain and request assistance  - Assess pain using appropriate pain scale  - Administer analgesics based on type and severity of pain and evaluate response  - Implement non-pharmacological measures as appropriate and evaluate response  - Consider cultural and social influences on pain and pain management  - Notify physician/advanced practitioner if interventions unsuccessful or patient reports new pain  Outcome: Progressing     Problem: INFECTION - ADULT  Goal: Absence or prevention of progression during hospitalization  Description: INTERVENTIONS:  - Assess and monitor for signs and symptoms of infection  - Monitor lab/diagnostic results  - Monitor all insertion sites, i e  indwelling lines, tubes, and drains  - Monitor endotracheal if appropriate and nasal secretions for changes in amount and color  - [place] appropriate cooling/warming therapies per order  - Administer medications as ordered  - Instruct and encourage patient and family to use good hand hygiene technique  - Identify and instruct in appropriate isolation precautions for identified infection/condition  Outcome: Progressing     Problem: SAFETY ADULT  Goal: Patient will remain free of falls  Description: INTERVENTIONS:  - Educate patient/family on patient safety including physical limitations  - Instruct patient to call for assistance with activity   - Consult OT/PT to assist with strengthening/mobility   - Keep Call bell within reach  - Keep bed low and locked with side rails adjusted as appropriate  - Keep care items and personal belongings within reach  - Initiate and maintain comfort rounds  - Make Fall Risk Sign visible to staff    - Apply yellow socks and bracelet for high fall risk patients  - Consider moving patient to room near nurses station  Outcome: Progressing  Goal: Maintain or return to baseline ADL function  Description: INTERVENTIONS:  -  Assess patient's ability to carry out ADLs; assess patient's baseline for ADL function and identify physical deficits which impact ability to perform ADLs (bathing, care of mouth/teeth, toileting, grooming, dressing, etc )  - Assess/evaluate cause of self-care deficits   - Assess range of motion  - Assess patient's mobility; develop plan if impaired  - Assess patient's need for assistive devices and provide as appropriate  - Encourage maximum independence but intervene and supervise when necessary  - Involve family in performance of ADLs  - Assess for home care needs following discharge   - Consider OT consult to assist with ADL evaluation and planning for discharge  - Provide patient education as appropriate  Outcome: Progressing  Goal: Maintains/Returns to pre admission functional level  Description: INTERVENTIONS:  - Perform BMAT or MOVE assessment daily    - Set and communicate daily mobility goal to care team and patient/family/caregiver  - Collaborate with rehabilitation services on mobility goals if consulted    - Out of bed for toileting  - Record patient progress and toleration of activity level   Outcome: Progressing     Problem: DISCHARGE PLANNING  Goal: Discharge to home or other facility with appropriate resources  Description: INTERVENTIONS:  - Identify barriers to discharge w/patient and caregiver  - Arrange for needed discharge resources and transportation as appropriate  - Identify discharge learning needs (meds, wound care, etc )  - Arrange for interpretive services to assist at discharge as needed  - Refer to Case Management Department for coordinating discharge planning if the patient needs post-hospital services based on physician/advanced practitioner order or complex needs related to functional status, cognitive ability, or social support system  Outcome: Progressing     Problem: Knowledge Deficit  Goal: Patient/family/caregiver demonstrates understanding of disease process, treatment plan, medications, and discharge instructions  Description: Complete learning assessment and assess knowledge base    Interventions:  - Provide teaching at level of understanding  - Provide teaching via preferred learning methods  Outcome: Progressing     Problem: GASTROINTESTINAL - ADULT  Goal: Minimal or absence of nausea and/or vomiting  Description: INTERVENTIONS:  - Administer IV fluids if ordered to ensure adequate hydration  - Maintain NPO status until nausea and vomiting are resolved  - Nasogastric tube if ordered  - Administer ordered antiemetic medications as needed  - Provide nonpharmacologic comfort measures as appropriate  - Advance diet as tolerated, if ordered  - Consider nutrition services referral to assist patient with adequate nutrition and appropriate food choices  Outcome: Progressing  Goal: Maintains adequate nutritional intake  Description: INTERVENTIONS:  - Monitor percentage of each meal consumed  - Identify factors contributing to decreased intake, treat as appropriate  - Assist with meals as needed  - Monitor I&O, weight, and lab values if indicated  - Obtain nutrition services referral as needed  Outcome: Progressing     Problem: METABOLIC, FLUID AND ELECTROLYTES - ADULT  Goal: Electrolytes maintained within normal limits  Description: INTERVENTIONS:  - Monitor labs and assess patient for signs and symptoms of electrolyte imbalances  - Administer electrolyte replacement as ordered  - Monitor response to electrolyte replacements, including repeat lab results as appropriate  - Instruct patient on fluid and nutrition as appropriate  Outcome: Progressing  Goal: Fluid balance maintained  Description: INTERVENTIONS:  - Monitor labs   - Monitor I/O and WT  - Instruct patient on fluid and nutrition as appropriate  - Assess for signs & symptoms of volume excess or deficit  Outcome: Progressing     Problem: Prexisting or High Potential for Compromised Skin Integrity  Goal: Skin integrity is maintained or improved  Description: INTERVENTIONS:  - Identify patients at risk for skin breakdown  - Assess and monitor skin integrity  - Assess and monitor nutrition and hydration status  - Monitor labs   - Assess for incontinence   - Turn and reposition patient  - Assist with mobility/ambulation  - Relieve pressure over bony prominences  - Avoid friction and shearing  - Provide appropriate hygiene as needed including keeping skin clean and dry  - Evaluate need for skin moisturizer/barrier cream  - Collaborate with interdisciplinary team   - Patient/family teaching  - Consider wound care consult   Outcome: Progressing

## 2021-09-08 NOTE — UTILIZATION REVIEW
Continued Stay Review    Date: 9/8                     Current Patient Class: Inpatient   Current Level of Care: Med/surg  HPI:86 y o  female initially admitted on 9/3    Assessment/Plan:    Internal medicine note: Continue with PPI IV  Large hiatal hernia noted  As per [facility] hematology will transfuse 2 products of cryoprecipitate will repeat a fibrinogen 1 hour post that and then repeat it today in the afternoon will schedule for 1400 DIC panel along with CBC  Will hold aspirin well that is being worked up  9/8 GI consult:  Nausea and vomiting has improved  Trend LFTs  CLear liquids as tolerated  Follow up HIDA scan, surg team following, unclear if gall bladder related to symptoms  EGD 9/7  FINDINGS:  · 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  · Large sliding hiatal hernia (type I hiatal hernia) without Kiesha Moreira lesions present - GE junction 25 cm from the incisors, diaphragmatic impression 35 cm from the incisors  · Performed biopsies to rule out H  pylori in the body of the stomach, incisura and antrum  · The duodenal bulb and 2nd part of the duodenum appeared normal   · Performed biopsies to rule out celiac disease in the duodenal bulb and 2nd part of the duodenum    9/8 Continues with hyperbilirubinemia with total bilirubin 1 82  CT scan abdomen pelvis without IV contrast done this morning now shows distended gallbladder with surrounding inflammatory changes suggesting acute cholecystitis  Very poor surgical candidate  No IV abx at this time   Check HIDA scan       9/8Requiring higher level of care for hematology/oncology   Vital Signs:   Date/Time  Temp  Pulse  Resp  BP  MAP (mmHg)  SpO2  O2 Device  Cardiac (WDL)  Patient Position - Orthostatic VS   09/08/21 15:14:01  98 °F (36 7 °C)  82  15  124/61  82  98 %  --  --  --   09/08/21 1255  98 1 °F (36 7 °C)  83  18  132/62  --  --  --  --  --   09/08/21 1245  98 2 °F (36 8 °C)  94  18  150/78  --  --  --  --  --   09/08/21 1236  97 9 °F (36 6 °C)  91  18  134/71  --  --  --  --  --   09/08/21 1206  97 8 °F (36 6 °C)  70  17  123/62  --  --  --  --  --   09/08/21 1135  97 7 °F (36 5 °C)  71  19  122/61  --  99 %  --  --  --   09/08/21 11:33:56  97 7 °F (36 5 °C)  68  20  122/61  81  99 %  --  --  --   09/08/21 1126  97 4 °F (36 3 °C)Abnormal   --  18  --  --             Pertinent Labs/Diagnostic Results:   9/8 CT C/A/P: Distended gallbladder with surrounding inflammatory changes suggesting acute cholecystitis  Bilateral adnexal cysts measuring 3 7 x 2 7 cm on the right and 1 8 x 1 8 cm on the left   Further evaluation with nonemergent ultrasound given the postmenopausal status recommended         Results from last 7 days   Lab Units 09/08/21  1412 09/08/21  0504 09/07/21  0513 09/06/21  0551 09/05/21  0503 09/03/21  0859   WBC Thousand/uL 8 81 2 81* 4 38 3 12* 2 45* 3 52*   HEMOGLOBIN g/dL 8 0* 8 2* 9 0* 9 3* 7 2* 10 0*   HEMATOCRIT % 25 0* 24 9* 27 6* 28 0* 22 9* 31 0*   PLATELETS Thousands/uL 63* 66* 74* 84* 84* 107*   NEUTROS ABS Thousands/µL 7 81*  --   --   --   --  2 52     Results from last 7 days   Lab Units 09/06/21  0551   RETIC CT ABS  144,000*   RETIC CT PCT % 4 85*     Results from last 7 days   Lab Units 09/08/21  0504 09/07/21  0513 09/05/21  0503 09/04/21  0600 09/03/21  0859   SODIUM mmol/L 137 139 138 138 135*   POTASSIUM mmol/L 3 4* 4 0 3 7 4 2 4 0   CHLORIDE mmol/L 101 102 104 103 99*   CO2 mmol/L 28 28 28 24 28   ANION GAP mmol/L 8 9 6 11 8   BUN mg/dL 17 17 15 15 18   CREATININE mg/dL 0 62 0 70 0 68 0 77 0 98   EGFR ml/min/1 73sq m 82 79 79 70 52   CALCIUM mg/dL 8 1* 8 2* 7 9* 8 0* 8 6   MAGNESIUM mg/dL  --   --  1 8  --   --    PHOSPHORUS mg/dL  --   --  3 1  --   --      Results from last 7 days   Lab Units 09/08/21  0504 09/07/21  0513 09/05/21  0503 09/04/21  0600 09/03/21  0859   AST U/L 52* 54* 42 55* 59*   ALT U/L <6* 8* 7* 7* 8*   ALK PHOS U/L 113 129* 76 92 104   TOTAL PROTEIN g/dL 4 9* 5 2* 4 7* 5 4* 6 7   ALBUMIN g/dL 2  8* 3 0* 2 8* 3 2* 4 0   TOTAL BILIRUBIN mg/dL 1 82* 2 37* 1 58* 2 14* 2 47*   BILIRUBIN DIRECT mg/dL 0 48* 0 66* 0 39*  --   --      Results from last 7 days   Lab Units 09/07/21  0753 09/07/21  0717   POC GLUCOSE mg/dl 131 61*     Results from last 7 days   Lab Units 09/08/21  0504 09/07/21  0513 09/05/21  0503 09/04/21  0600 09/03/21  0859   GLUCOSE RANDOM mg/dL 68 64* 71 68 83     Results from last 7 days   Lab Units 09/08/21  1357 09/07/21  1216   D-DIMER QUANTITATIVE ug/ml FEU 2 46* 1 12*     Results from last 7 days   Lab Units 09/08/21  1357 09/08/21  0857 09/07/21  1216   PROTIME seconds 13 2 14 2 13 2   INR  1 01 1 11 1 01   PTT seconds 28 29 28     Results from last 7 days   Lab Units 09/04/21  0600   TSH 3RD GENERATON uIU/mL 1 615         Results from last 7 days   Lab Units 09/03/21  0859   LACTIC ACID mmol/L 1 2             Results from last 7 days   Lab Units 09/07/21  0513 09/06/21  0551   NT-PRO BNP pg/mL 3,134* 4,272*     Results from last 7 days   Lab Units 09/05/21  0503   FERRITIN ng/mL 365     Results from last 7 days   Lab Units 09/06/21  0551   HEP B S AG  Non-reactive   HEP C AB  Non-reactive   HEP B C IGM  Non-reactive   HEP B C TOTAL AB  Non-reactive     Results from last 7 days   Lab Units 09/03/21  0859   LIPASE u/L 154     Results from last 7 days   Lab Units 09/07/21  0513   CRP mg/L 1 1         Results from last 7 days   Lab Units 09/08/21  0950   CLARITY UA  Clear   COLOR UA  Dk Yellow   SPEC GRAV UA  1 020   PH UA  6 5   GLUCOSE UA mg/dl Negative   KETONES UA mg/dl 40 (2+)*   BLOOD UA  Trace-Intact*   PROTEIN UA mg/dl Trace*   NITRITE UA  Negative   BILIRUBIN UA  Small*   UROBILINOGEN UA E U /dl 4 0*   LEUKOCYTES UA  Trace*   WBC UA /hpf 2-4   RBC UA /hpf 1-2   BACTERIA UA /hpf Moderate*   EPITHELIAL CELLS WET PREP /hpf Occasional   MUCUS THREADS  Occasional*           Medications:   Scheduled Medications:  atorvastatin, 80 mg, Oral, Daily  carbidopa-levodopa, 1 tablet, Oral, 5x Daily  metoprolol tartrate, 25 mg, Oral, Q12H SWATI  mirtazapine, 7 5 mg, Oral, HS  pantoprazole, 40 mg, Oral, Early Morning  polyethylene glycol, 17 g, Oral, Daily  Safinamide Mesylate, 100 mg, Oral, Daily  senna-docusate sodium, 1 tablet, Oral, HS      Continuous IV Infusions:     PRN Meds:  acetaminophen, 650 mg, Oral, Q6H PRN  ALPRAZolam, 0 25 mg, Oral, TID PRN  metoclopramide, 10 mg, Intravenous, Q6H PRN  pneumococcal 13-valent conjugate vaccine, 0 5 mL, Intramuscular, Prior to discharge  saliva substitute, 5 spray, Mouth/Throat, 4x Daily PRN  trimethobenzamide, 200 mg, Intramuscular, Q8H PRN        Discharge Plan:TBD  Network Utilization Review Department  ATTENTION: Please call with any questions or concerns to [phone] and carefully listen to the prompts so that you are directed to the right person  All voicemails are confidential   Adwoa Kartik all requests for admission clinical reviews, approved or denied determinations and any other requests to dedicated fax number below belonging to the campus where the patient is receiving treatment   List of dedicated fax numbers for the Facilities:  [address] DENIALS (Administrative/Medical Necessity) [phone]   [address] (Maternity/NICU/Pediatrics) [phone]   [address] [place] [address]82 [id number] [place] [address] [address] [phone] [phone]

## 2021-09-08 NOTE — ASSESSMENT & PLAN NOTE
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

## 2021-09-08 NOTE — PROGRESS NOTES
[address]  Progress Note - Junior Nuñez 1934, 80 y o  female MRN: [id number]  Unit/Bed#: -01 Encounter: [id number]  Primary Care Provider: Sean Lopez   Date and time admitted to hospital: 9/3/2021  8:43 AM    * Intractable nausea and vomiting  Assessment & Plan  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  · Large sliding hiatal hernia (type I hiatal hernia) without Henley Massa lesions present - GE junction 25 cm from the incisors, diaphragmatic impression 35 cm from the incisors  · C/w IV PPI regimen -  · No abdominal pain  · PRN emesis control  · No obvious ulcers found of the intractable nausea vomiting as discussed with Gastroenterology started Remeron 7 5 mg at night patient has been tolerating clears without any report of nausea today still poor bowel movement will give her a dose of Dulcolax and then to surgical soft diet and see how she does    Acute hemolytic anemia (HCC)  Assessment & Plan  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 low haptoglobin elevated LDH elevated unconjugated bilirubinemia  Shama test is negative unknown etiology at this time kody pending, rf ordered  · On previous CT abdomen and pelvis there is no evidence of hypersplenism  · No evidence of bleed and the EGD although she never had a colonoscopy her previous occult were negative and she had no iron deficiency on the iron panel no recent medicines to cause it no evidence of any infection evident at this time  She has been transfused during this admission as well    · Transfuse if less than 7 5  · Discussing and managing the case with Hematology-Oncology from [facility]  · Patient had a bioprosthetic aortic valve replacement on June 29, 2021 at [facility] she had an 2D echo done on August 30, 2021 which showed a mild perivalvular leak which I have reached out in discussed with her cardiothoracic surgeon group at [facility] with leak being so mild they do not think this is attributing to acute hemolytic anemia    Pancytopenia  Assessment & Plan  · Although leukopenia is recurrent resolved and comes back no blasts on differential   A but the anemia has continued to progress since July and thrombocytopenia has been worsening since admission and also on the past admission although over 100,000 now is down to 66,000 see evaluation workup for above    Paroxysmal atrial fibrillation   Assessment & Plan  Rate controlled on Lopressor - c/w ASA - not on chronic anticoagulation - outpatient follow-up with cardiology- hold asa secondary to workup of DIC    Chronic diastolic CHF  Assessment & Plan  Continue beta-blockade with Lopressor - not chronically on diuretics  Echocardiogram @ [address] in June 2021 w/ an EF of 60-65%    Severe protein-calorie malnutrition  Assessment & Plan  BMI of 16 4 in the setting of decreased appetite/oral intake and evidence of muscle wasting/atrophy on exam  Initiated nutritional supplementation with meals    Thrombocytopenia ([facility] [address] )  Assessment & Plan  · Although has been progressive previously was in the 100,000 she presented with lower and continued to decline today to 66,000 I did notice some purpura/petechiae on her back and her chest   She reports no gum bleeding  There is no gross bleeding  She does have hemolytic anemia and also with her studies is compliant with DIC with fibrinogen being low schistocytes home will cells questionable if it is TTP unknown cause at this time even of DIC    No evidence of infection will workup further obtain a UA which I reviewed no evidence of UTI do a CT chest abdomen and pelvis she has been afebrile  · With TTP patients do have some associated GI symptoms of nausea and vomiting 45% she does not have any renal failure hematuria or neurological symptoms at this time  · I have discussed with [facility] hematology will transfuse 2 products of cryoprecipitate will repeat a fibrinogen 1 hour post that and then repeat it today in the afternoon will schedule for 1400 DIC panel along with CBC  Will hold aspirin well that is being worked up  · On the previous admission patient was not on any heparin products this admission she was initially on Lovenox products for 2 days    Parkinson's disease   Assessment & Plan  C/w Sinemet/Xadago  PT/OT as tolerated    History of transcatheter aortic valve replacement (TAVR)  Assessment & Plan  History of TAVR @ [facility] recently on July 2282 with 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  Saw CT surgery on 8/31/21 w/ recommendation to stop Plavix due to patient complaints of GI upset  Serial outpatient followups- will hold aspirin for now secondary to workup of DIC          VTE Pharmacologic Prophylaxis: VTE Score: 5 High Risk (Score >/= 5) - Pharmacological DVT Prophylaxis Contraindicated  Sequential Compression Devices Ordered  Patient Centered Rounds: I performed bedside rounds with nursing staff today  Discussions with Specialists or Other Care Team Provider:  I have discussed with Hematology-Oncology today    Education and Discussions with Family / Patient:  Patient will update her son     Time Spent for Care: 45 minutes  More than 50% of total time spent on counseling and coordination of care as described above  Current Length of Stay: 5 day(s)  Current Patient Status: Inpatient   Certification Statement: The patient will continue to require additional inpatient hospital stay due to Secondary to acute hemolytic anemia DIC  Discharge Plan: Anticipate discharge in >72 hrs to rehab facility      Code Status: Level 3 - DNAR and DNI    Subjective:   Patient seen and examined she actually has been eating and no nausea since yesterday  She did not have a good bowel movement for few days still denies any abdominal pain chest pain or shortness of breath denies any bleeding    Objective:     Vitals:   Temp (24hrs), Av 3 °F (36 8 °C), Min:98 °F (36 7 °C), Max:98 4 °F (36 9 °C)    Temp:  [98 °F (36 7 °C)-98 4 °F (36 9 °C)] 98 4 °F (36 9 °C)  HR:  [67-74] 74  Resp:  [17-18] 18  BP: ()/(55-70) 105/64  SpO2:  [96 %-99 %] 96 %  Body mass index is 16 31 kg/m²  Input and Output Summary (last 24 hours): Intake/Output Summary (Last 24 hours) at 2021 1115  Last data filed at 2021 0950  Gross per 24 hour   Intake 280 ml   Output 550 ml   Net -270 ml       Physical Exam:   Physical Exam  Vitals and nursing note reviewed  Constitutional:       General: She is not in acute distress  Appearance: She is well-developed  HENT:      Head: Normocephalic and atraumatic  Eyes:      Conjunctiva/sclera: Conjunctivae normal    Cardiovascular:      Rate and Rhythm: Normal rate and regular rhythm  Heart sounds: No murmur heard  Pulmonary:      Effort: Pulmonary effort is normal  No respiratory distress  Breath sounds: Normal breath sounds  No wheezing or rales  Abdominal:      General: There is no distension  Palpations: Abdomen is soft  Tenderness: There is no abdominal tenderness  Musculoskeletal:         General: Swelling (Trace on the right ankle) present  Cervical back: Neck supple  Skin:     General: Skin is warm and dry  Findings: Rash (Evidence of some purpura/petechiae on her chest and back) present  Neurological:      General: No focal deficit present  Mental Status: She is alert and oriented to person, place, and time  Mental status is at baseline     Psychiatric:         Mood and Affect: Mood normal          Additional Data:     Labs:  Results from last 7 days   Lab Units 21  0504 21  0551 21  6727 WBC Thousand/uL 2 81* 3 12* 3 52*   HEMOGLOBIN g/dL 8 2* 9 3* 10 0*   HEMATOCRIT % 24 9* 28 0* 31 0*   PLATELETS Thousands/uL 66* 84* 107*   NEUTROS PCT %  --   --  72   LYMPHS PCT %  --   --  16   LYMPHO PCT %  --  14  --    MONOS PCT %  --   --  9   MONO PCT %  --  10  --    EOS PCT %  --  0 1     Results from last 7 days   Lab Units 09/08/21  0504   SODIUM mmol/L 137   POTASSIUM mmol/L 3 4*   CHLORIDE mmol/L 101   CO2 mmol/L 28   BUN mg/dL 17   CREATININE mg/dL 0 62   ANION GAP mmol/L 8   CALCIUM mg/dL 8 1*   ALBUMIN g/dL 2 8*   TOTAL BILIRUBIN mg/dL 1 82*   ALK PHOS U/L 113   ALT U/L <6*   AST U/L 52*   GLUCOSE RANDOM mg/dL 68     Results from last 7 days   Lab Units 09/08/21  0857   INR  1 11     Results from last 7 days   Lab Units 09/07/21  0753 09/07/21  0717   POC GLUCOSE mg/dl 131 61*         Results from last 7 days   Lab Units 09/03/21  0859   LACTIC ACID mmol/L 1 2       Lines/Drains:  Invasive Devices     Peripheral Intravenous Line            Peripheral IV 09/07/21 Right Antecubital 1 day                      Imaging: Reviewed radiology reports from this admission including: ultrasound(s)    Recent Cultures (last 7 days):         Last 24 Hours Medication List:   Current Facility-Administered Medications   Medication Dose Route Frequency Provider Last Rate    acetaminophen  650 mg Oral Q6H PRN Mary Jane Rosado, DO      ALPRAZolam  0 25 mg Oral TID PRN Kaila Gomez PA-C      atorvastatin  80 mg Oral Daily Tejinder Hinton, DO      bisacodyl  10 mg Oral Once Rafiq Casarez MD      carbidopa-levodopa  1 tablet Oral 5x Daily Mary Jane Rosado DO      metoclopramide  10 mg Intravenous Q6H PRN Mary Jane Rosado, DO      metoprolol tartrate  25 mg Oral Q12H [address] Tejinder Hinton, DO      mirtazapine  7 5 mg Oral HS Rafiq Casarez MD      pantoprazole  40 mg Oral Early Morning Rafiq Casarez MD      pneumococcal 13-valent conjugate vaccine  0 5 mL Intramuscular Prior to discharge Lucia Garza PA-C      polyethylene glycol  17 g Oral Daily Tejinder Hinton DO      Safinamide Mesylate  100 mg Oral Daily Tejinder Hinton DO      saliva substitute  5 spray Mouth/Throat 4x Daily PRN JADA Conroy      senna-docusate sodium  1 tablet Oral HS Tejinder Hinton DO      trimethobenzamide  200 mg Intramuscular Q8H PRN JADA Cordero          Today, Patient Was Seen By: Can Mcclain MD    **Please Note: This note may have been constructed using a voice recognition system  **

## 2021-09-08 NOTE — ASSESSMENT & PLAN NOTE
Rate controlled on Lopressor - c/w ASA - not on chronic anticoagulation - outpatient follow-up with cardiology- hold asa secondary to workup of DIC

## 2021-09-08 NOTE — CONSULTS
Consultation - General Surgery   [facility] 80 y o  female MRN: [id number]  Unit/Bed#: -01 Encounter: [id number]    Assessment/Plan     Assessment:  59-year-old white female with paroxysmal atrial fibrillation, chronic diastolic congestive heart failure, hemolytic anemia and pancytopenia status post 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 with continued intractable nausea and vomiting, decreased appetite, no abdominal pain  Patient likely has developed DIC  Right upper quadrant ultrasound on September 3rd did not show any evidence of cholelithiasis, sludge was noted  No gallbladder wall thickening or biliary obstruction  She continues with hyperbilirubinemia, total bilirubin this morning 1 82 (2 37, 1 58)  Pancytopenia, WBC 2 81  Thrombocytopenia, platelet count 68  Hemoglobin 8 2  CT scan abdomen pelvis without IV contrast done this morning now shows distended gallbladder with surrounding inflammatory changes suggesting acute cholecystitis  EGD yesterday showed nonobstructing ring in the lower 3rd of the esophagus with large sliding hiatal hernia  Patient would be a very poor surgical candidate  If HIDA scan was definitive for acute cholecystitis then the likely option for treatment would be percutaneous cholecystostomy performed by IR  Plan:  Surgical soft diet as tolerated today  Nothing by mouth after midnight  Discussed with Dr Cristian Bunn, she will examine the patient later today  Recommend HIDA scan, no CCK  No IV antibiotics at this time  Analgesics/antiemetics as ordered p r n  PPI   Serial abdominal exams  Repeat CBC, CMP in a m  Gastroenterology recommendations reviewed  Transfusion protocol, transfuse if hemoglobin less than 7 5  Patient is asking for an update to call her son, explained that we will wait until after the HIDA scan results are known    No anticoagulation because of pancytopenia  Medical management per the attending hospitalist physician    History of Present Illness     HPI:  Libertad Cortez Ranjana Martin is a 80 y o  female with multiple medical comorbidities including pancytopenia, chronic atrial fibrillation, and TAVR done in June of this summer who presents with recurrent admission because of intractable nausea and vomiting  She had been hospitalized earlier in August because of nausea and vomiting which seemed to improve now returned for the past several days with decreased appetite  No fever or chills  She denies any dysphagia  EGD was performed yesterday here showed nonobstructing ring lower 3rd of the esophagus with large sliding hiatal hernia  Patient continues with intermittent nausea, she vomited once this morning  General surgery consultation has been requested at this time for evaluation of abnormal CT scan abdomen and pelvis done this morning without contrast   On admission she had right upper quadrant abdominal ultrasound which showed gallbladder sludge but no definite cholelithiasis or gallbladder wall thickening  This morning she does not really admit any abdominal pain  She has not had a bowel movement for several days but did not notice any blood in her stool  No chest pain or shortness of breath  She has been afebrile  Patient is currently in atrial fibrillation, anticoagulation and aspirin has been on hold secondary to workup of DIC  Inpatient consult to Acute Care Surgery  Consult performed by: Radha Abdul PA-C  Consult ordered by: Renetta Pagan MD        Review of Systems   Constitutional: Positive for activity change and appetite change  Negative for chills, diaphoresis, fatigue, fever and unexpected weight change  HENT: Negative  Eyes: Negative  Respiratory: Negative for cough, chest tightness, shortness of breath, wheezing and stridor  Cardiovascular: Negative for chest pain, palpitations and leg swelling  Gastrointestinal: Positive for constipation, nausea and vomiting  Negative for abdominal distention, abdominal pain, blood in stool and diarrhea  Endocrine: Negative  Genitourinary: Negative  Negative for difficulty urinating, dysuria, frequency, hematuria and urgency  Allergic/Immunologic: Negative  Neurological: Negative  Hematological: Negative for adenopathy  Does not bruise/bleed easily  Psychiatric/Behavioral: Negative          Historical Information   Past Medical History:   Diagnosis Date    Diastolic congestive heart failure (HCC)     DVT (deep venous thrombosis) (HCC)     History of transcatheter aortic valve replacement (TAVR)     [facility] infarction) ([facility] [address] )     Parkinson's disease (HCC)     Paroxysmal atrial fibrillation (HCC)     UTI (urinary tract infection)      Past Surgical History:   Procedure Laterality Date    BREAST SURGERY      CARDIAC SURGERY      HYSTERECTOMY      REPLACEMENT AORTIC VALVE TRANSCATHETER (TAVR)      TUBAL LIGATION       Social History   Social History     Substance and Sexual Activity   Alcohol Use Not Currently     Social History     Substance and Sexual Activity   Drug Use Not Currently     E-Cigarette/Vaping    E-Cigarette Use Never User      E-Cigarette/Vaping Substances    Nicotine No     THC No     CBD No     Flavoring No     Other No     Unknown No      Social History     Tobacco Use   Smoking Status Never Smoker   Smokeless Tobacco Never Used     Family History: non-contributory    Meds/Allergies   current meds:   Current Facility-Administered Medications   Medication Dose Route Frequency    acetaminophen (TYLENOL) tablet 650 mg  650 mg Oral Q6H PRN    ALPRAZolam (XANAX) tablet 0 25 mg  0 25 mg Oral TID PRN    atorvastatin (LIPITOR) tablet 80 mg  80 mg Oral Daily    carbidopa-levodopa (SINEMET CR)  mg per ER tablet 1 tablet  1 tablet Oral 5x Daily    metoclopramide (REGLAN) injection 10 mg  10 mg Intravenous Q6H PRN    metoprolol tartrate (LOPRESSOR) tablet 25 mg  25 mg Oral Q12H [address]    mirtazapine (REMERON) tablet 7 5 mg  7 5 mg Oral HS    pantoprazole (PROTONIX) EC tablet 40 mg  40 mg Oral Early Morning    pneumococcal 13-valent conjugate vaccine (PREVNAR-13) IM injection 0 5 mL  0 5 mL Intramuscular Prior to discharge    polyethylene glycol (MIRALAX) packet 17 g  17 g Oral Daily    Safinamide Mesylate TABS 100 mg  100 mg Oral Daily    saliva substitute (MOUTH KOTE) mucosal solution 5 spray  5 spray Mouth/Throat 4x Daily PRN    senna-docusate sodium (SENOKOT S) 8 6-50 mg per tablet 1 tablet  1 tablet Oral HS    trimethobenzamide (TIGAN) IM injection 200 mg  200 mg Intramuscular Q8H PRN     Allergies   Allergen Reactions    Amoxicillin Diarrhea    Ciprofloxacin Vomiting    Erythromycin Diarrhea    Plavix [Clopidogrel] GI Intolerance       Objective   First Vitals:   Blood Pressure: 161/75 (09/03/21 0848)  Pulse: 69 (09/03/21 0848)  Temperature: 97 9 °F (36 6 °C) (09/03/21 0848)  Temp Source: Oral (09/03/21 0848)  Respirations: 18 (09/03/21 0848)  Height: 5' 5" (165 1 cm) (09/03/21 0848)  Weight - Scale: 44 7 kg (98 lb 8 7 oz) (09/03/21 0848)  SpO2: 98 % (09/03/21 0848)    Current Vitals:   Blood Pressure: 123/62 (09/08/21 1206)  Pulse: 70 (09/08/21 1206)  Temperature: 97 8 °F (36 6 °C) (09/08/21 1206)  Temp Source: Oral (09/07/21 1254)  Respirations: 17 (09/08/21 1206)  Height: 5' 5" (165 1 cm) (09/07/21 1254)  Weight - Scale: 44 5 kg (98 lb) (09/07/21 1254)  SpO2: 99 % (09/08/21 1133)      Intake/Output Summary (Last 24 hours) at 9/8/2021 1216  Last data filed at 9/8/2021 1206  Gross per 24 hour   Intake 442 ml   Output 550 ml   Net -108 ml       Invasive Devices     Peripheral Intravenous Line            Peripheral IV 09/08/21 Distal;Dorsal (posterior); Right Forearm <1 day                Physical Exam     Thin frail  Appears chronically ill  Decreased skin turgor, pale  Head normocephalic  Sclera anicteric  Oral mucosa pink and moist   Neck no increased JVP  No goiter or carotid bruit heard  Chest increased AP diameter    Breast atrophic bilaterally, no palpable masses  Heart irregular rate and rhythm, grade 3/6 systolic murmur heard throughout the precordium  Lungs clear to auscultation  Back mild kyphotic curvature  No CVA tenderness to percussion  Abdomen positive bowel sounds heard, hypoactive in nature  Midline surgical scar, she has an umbilical hernia noted  The abdomen is soft  Liver edge not palpable  Negative Krishnamurthy sign  Some very mild tenderness in the right subcostal area  No masses felt  Moves all 4 extremities well  No calf tenderness  Muscle atrophy noted in the extremities  Neurological exam shows normal mental status  No tremor noted  Cranial nerves appear symmetrical   Equal  strength enhanced  Ambulation not observed  No focal motor weakness is seen  Lab Results:   I have personally reviewed pertinent lab results    , CBC:   Lab Results   Component Value Date    WBC 2 81 (L) 09/08/2021    HGB 8 2 (L) 09/08/2021    HCT 24 9 (L) 09/08/2021    MCV 93 09/08/2021    PLT 66 (L) 09/08/2021    MCH 30 6 09/08/2021    MCHC 32 9 09/08/2021    RDW 20 9 (H) 09/08/2021    NRBC 0 09/08/2021   , CMP:   Lab Results   Component Value Date    SODIUM 137 09/08/2021    K 3 4 (L) 09/08/2021     09/08/2021    CO2 28 09/08/2021    BUN 17 09/08/2021    CREATININE 0 62 09/08/2021    CALCIUM 8 1 (L) 09/08/2021    AST 52 (H) 09/08/2021    ALT <6 (L) 09/08/2021    ALKPHOS 113 09/08/2021    EGFR 82 09/08/2021   , Coagulation:   Lab Results   Component Value Date    INR 1 11 09/08/2021   , Urinalysis:   Lab Results   Component Value Date    COLORU Dk Yellow 09/08/2021    CLARITYU Clear 09/08/2021    SPECGRAV 1 020 09/08/2021    PHUR 6 5 09/08/2021    LEUKOCYTESUR Trace (A) 09/08/2021    NITRITE Negative 09/08/2021    GLUCOSEU Negative 09/08/2021    KETONESU 40 (2+) (A) 09/08/2021    BILIRUBINUR Small (A) 09/08/2021    BLOODU Trace-Intact (A) 09/08/2021   , Lipase: No results found for: LIPASE  Imaging: I have personally reviewed pertinent films in PACS     CT CHEST, ABDOMEN AND PELVIS WITHOUT IV CONTRAST 09/08/2021     INDICATION:   dic r/o infection      COMPARISON:  Compared with 8/20/2021     LIVER/BILIARY TREE:  One or more subcentimeter sharply circumscribed low-density hepatic lesion(s) are noted, too small to accurately characterize, but statistically most likely to represent subcentimeter hepatic cysts  No suspicious solid hepatic   lesion is identified  Hepatic contours are normal   No biliary dilatation      GALLBLADDER:  Distended gallbladder  Surrounding inflammatory changes      IMPRESSION:     1  Distended gallbladder with surrounding inflammatory changes suggesting acute cholecystitis      2 Bilateral adnexal cysts measuring 3 7 x 2 7 cm on the right and 1 8 x 1 8 cm on the left  Further evaluation with nonemergent ultrasound given the postmenopausal status recommended  RIGHT UPPER QUADRANT ULTRASOUND 09/03/2021     INDICATION:     Elevated bilirubin      COMPARISON:  CT scan 8/5/2021     BILIARY:  The gallbladder is normal in caliber  No wall thickening or pericholecystic fluid  There is layering sludge without evidence for shadowing calculi  No sonographic Krishnamurthy sign  No intrahepatic biliary dilatation  CBD measures 4 mm  No choledocholithiasis       IMPRESSION:     Gallbladder sludge  Otherwise, no evidence of biliary ductal dilatation  Hepatic and right renal cysts        EKG, Pathology, and Other Studies: I have personally reviewed pertinent reports  Counseling / Coordination of Care  Total floor / unit time spent today 40 minutes  Greater than 50% of total time was spent with the patient and / or family counseling and / or coordination of care    A description of the counseling / coordination of care:  Review and comparison of diagnostic imaging reports including right upper quadrant abdominal ultrasound and CT scan findings of abdomen pelvis without contrast, review of laboratory studies, discussion with the attending hospitalist physician, examination patient and discussion with Geraldine Bermudez who also examine the patient in general surgical evaluation at this time      Darrell Zee PA-C

## 2021-09-08 NOTE — CASE MANAGEMENT
Case Management Progress Note    Patient name Fausto Lund  Location [facility] 312/-24 MRN [id number]  : 1934 Date 2021       LOS (days): 5  Geometric Mean LOS (GMLOS) (days): 3 70  Days to GMLOS:-1 2        BUNDLE:      OBJECTIVE:  Pt is a 80y o  year old , white or  [1], female with Jehovah's witness preference of None admitted on 9/3/2021  8:43 AM  Pt is admitted to [facility] [id number] at [address] [place] [address] with complaints of Intractable nausea and vomiting   Current admission status: Inpatient  Preferred Pharmacy:   [facility]/pharmacy #[id number]- [address], [address] [address] [place] [address] , [facility]  [facility] , [facility]  [facility] [address]  Phone: [phone] Fax: [phone]    Primary Care Provider: Saul Shelby    Primary Insurance: [address] [facility]  Secondary Insurance:     PROGRESS NOTE:    Informed by MD pt needs to go to higher level of care for hematology/ oncology specialist     Medical Necessity for transportation was completed   Copy available for transport team and a copy was placed in bin to be scanned into the chart '

## 2021-09-08 NOTE — ASSESSMENT & PLAN NOTE
· Although has been progressive previously was in the 100,000 she presented with lower and continued to decline today to 66,000 I did notice some purpura/petechiae on her back and her chest   She reports no gum bleeding  There is no gross bleeding  She does have hemolytic anemia and also with her studies is compliant with DIC with fibrinogen being low schistocytes home will cells questionable if it is TTP unknown cause at this time even of DIC  No evidence of infection will workup further obtain a UA which I reviewed no evidence of UTI do a CT chest abdomen and pelvis she has been afebrile  · With TTP patients do have some associated GI symptoms of nausea and vomiting 45% she does not have any renal failure hematuria or neurological symptoms at this time  · I have discussed with [facility] hematology will transfuse 2 products of cryoprecipitate will repeat a fibrinogen 1 hour post that and then repeat it today in the afternoon will schedule for 1400 DIC panel along with CBC    Will hold aspirin well that is being worked up  · On the previous admission patient was not on any heparin products this admission she was initially on Lovenox products for 2 days

## 2021-09-08 NOTE — PROGRESS NOTES
Progress Note - Tiffany Foster 80 y o  female MRN: [id number]    Unit/Bed#: -01 Encounter: [id number]      Assessment:  Intractable nausea and vomiting - improving  Hiatal hernia  Weight loss  Constipation  Anemia - hemolytic  Abnormal LFTs    Plan:  Follow up HIDA scan, surg team following, unclear if gall bladder related to symptoms  Trend LFT, coags, f/u serologic workup  Optimize electrolytes and thyroid  Bowel regimen   F/u EGD path  F/u Zn, Cu  Continue PPI  Clear liquids as tolerated, diet pending surgical plans      Subjective:   Resting comfortably in bed  Tolerated clears this morning  Improved nausea, no vomiting  S/p EGD 9/7 hiatal hernia, biopsies taken    Objective:     Vitals: Blood pressure 132/62, pulse 83, temperature 98 1 °F (36 7 °C), temperature source Oral, resp  rate 18, height 5' 5" (1 651 m), weight 44 5 kg (98 lb), SpO2 99 %  ,Body mass index is 16 31 kg/m²  Intake/Output Summary (Last 24 hours) at 9/8/2021 1503  Last data filed at 9/8/2021 1255  Gross per 24 hour   Intake 446 ml   Output 550 ml   Net -104 ml       Physical Exam:  Gen:  NAD  HEENT: no icterus  CVS:  RRR  Abd: soft, NT/ND, +BS    Invasive Devices     Peripheral Intravenous Line            Peripheral IV 09/08/21 Distal;Dorsal (posterior); Right Forearm <1 day                Lab, Imaging and other studies: I have personally reviewed pertinent reports        EGD 9/7  FINDINGS:  · 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  · Large sliding hiatal hernia (type I hiatal hernia) without Valentina Amador lesions present - GE junction 25 cm from the incisors, diaphragmatic impression 35 cm from the incisors  · Performed biopsies to rule out H  pylori in the body of the stomach, incisura and antrum  · The duodenal bulb and 2nd part of the duodenum appeared normal   · Performed biopsies to rule out celiac disease in the duodenal bulb and 2nd part of the duodenum

## 2021-09-08 NOTE — ASSESSMENT & PLAN NOTE
· Although leukopenia is recurrent resolved and comes back no blasts on differential   A but the anemia has continued to progress since July and thrombocytopenia has been worsening since admission and also on the past admission although over 100,000 now is down to 66,000 see evaluation workup for above

## 2021-09-08 NOTE — CASE MANAGEMENT
Case Management Progress Note    Patient name Luisa   Location [facility] 312/-72 MRN [id number]  : 1934 Date 2021       LOS (days): 5  Geometric Mean LOS (GMLOS) (days): 3 70  Days to GMLOS:-1 1        BUNDLE:      OBJECTIVE:  Pt is a 80y o  year old , white or  [1], female with Presybeterian preference of None admitted on 9/3/2021  8:43 AM  Pt is admitted to [facility] [id number] at [address] [place] [address] with complaints of Intractable nausea and vomiting   Current admission status: Inpatient  Preferred Pharmacy:   [facility]/pharmacy #[id number]- [id number], [address] [address] [place] [address] , [facility]  [facility] , [facility]  [facility] [address]  Phone: [phone] Fax: [phone]    Primary Care Provider: Tamanna Brothers    Primary Insurance: [id number] [facility]  Secondary Insurance:     PROGRESS NOTE:    CM spoke to patient about preferences for STR  Per patient his son and his wife will be making the decision  Pt did acknowledged she needs STR  CM called and left a message with Roslyn Pena [facility]) [phone] asking him to please call me back to discussed STR options  CM will continue to follow

## 2021-09-08 NOTE — ASSESSMENT & PLAN NOTE
· Found on right upper quadrant ultrasound on admission 1 evaluation for hyperbilirubinemia which is unconjugated and more likely related to hemolytic anemia    There is no evidence of cholecystitis she had no fever no abdominal pain today 1 further evaluating for any infectious process due to DIC which found to have a CT abdomen and pelvis done found to have acute cholecystitis had surgery evaluate the patient in discussed do not suspect clinically this is acute cholecystitis will proceed with HIDA scan for completeness for now hold off on any antibiotics

## 2021-09-08 NOTE — DISCHARGE SUMMARY
[address]  Discharge- Peter Jackson 1934, 80 y o  female MRN: [id number]  Unit/Bed#: -01 Encounter: [id number]  Primary Care Provider: Ish Hurd   Date and time admitted to hospital: 9/3/2021  8:43 AM    * Intractable nausea and vomiting  Assessment & Plan  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  · Large sliding hiatal hernia (type I hiatal hernia) without Joselito Clement lesions present - GE junction 25 cm from the incisors, diaphragmatic impression 35 cm from the incisors  · C/w IV PPI regimen -  · No abdominal pain  · PRN emesis control  · No obvious ulcers found of the intractable nausea vomiting as discussed with Gastroenterology started Remeron 7 5 mg at night patient has been tolerating clears without any report of nausea today still poor bowel movement will give her a dose of Dulcolax and then to surgical soft diet and see how she does looks like she has used Reglan 1 time today  I discussed with surgery they do not think this is cholecystitis this was found on the CT abdomen and pelvis as no cholecystitis 5 and on right upper quadrant and no clinical symptoms as well there was just sludge she will proceed with HIDA scan to ensure for completeness tomorrow will be NPO at midnight she will be transferred to [place] either [facility] or tomorrow for Hematology further evaluation and management    Gallbladder sludge  Assessment & Plan  · Found on right upper quadrant ultrasound on admission 1 evaluation for hyperbilirubinemia which is unconjugated and more likely related to hemolytic anemia    There is no evidence of cholecystitis she had no fever no abdominal pain today 1 further evaluating for any infectious process due to DIC which found to have a CT abdomen and pelvis done found to have acute cholecystitis had surgery evaluate the patient in discussed do not suspect clinically this is acute cholecystitis will proceed with HIDA scan for completeness for now hold off on any antibiotics    Acute hemolytic anemia ([address] )  Assessment & Plan  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 low haptoglobin elevated LDH elevated unconjugated bilirubinemia  Shama test is negative unknown etiology at this time kody pending, rf ordered  · On previous CT abdomen and pelvis there is no evidence of hypersplenism  · No evidence of bleed and the EGD although she never had a colonoscopy her previous occult were negative and she had no iron deficiency on the iron panel no recent medicines to cause it no evidence of any infection evident at this time  She has been transfused during this admission as well    · Transfuse if less than 7 5  · Discussing and managing the case with Hematology-Oncology from [facility]  · Patient had a bioprosthetic aortic valve replacement on June 29, 2021 at [facility] she had an 2D echo done on August 30, 2021 which showed a mild perivalvular leak which I have reached out in discussed with her cardiothoracic surgeon group at [facility] with leak being so mild they do not think this is attributing to acute hemolytic anemia- being transferred to [facility]    Pancytopenia  Assessment & Plan  · Although leukopenia is recurrent resolved and comes back no blasts on differential   A but the anemia has continued to progress since July and thrombocytopenia has been worsening since admission and also on the past admission although over 100,000 now is down to 66,000 see evaluation workup for above    Paroxysmal atrial fibrillation   Assessment & Plan  Rate controlled on Lopressor - c/w ASA - not on chronic anticoagulation - outpatient follow-up with cardiology- hold asa secondary to workup of DIC    Chronic diastolic CHF  Assessment & Plan  Continue beta-blockade with Lopressor - not chronically on diuretics  Echocardiogram @ [address] in June 2021 w/ an EF of 60-65%    Severe protein-calorie malnutrition  Assessment & Plan  BMI of 16 4 in the setting of decreased appetite/oral intake and evidence of muscle wasting/atrophy on exam  Initiated nutritional supplementation with meals    Thrombocytopenia (HCC)  Assessment & Plan  · Although has been progressive previously was in the 100,000 she presented with lower and continued to decline today to 66,000 I did notice some purpura/petechiae on her back and her chest   She reports no gum bleeding  There is no gross bleeding  She does have hemolytic anemia and also with her studies is compliant with DIC with fibrinogen being low schistocytes home will cells questionable if it is TTP unknown cause at this time even of DIC  No evidence of infection will workup further obtain a UA which I reviewed no evidence of UTI do a CT chest abdomen and pelvis she has been afebrile  · With TTP patients do have some associated GI symptoms of nausea and vomiting 45% she does not have any renal failure hematuria or neurological symptoms at this time  · I have discussed with [facility] hematology will transfuse 2 products of cryoprecipitate will repeat a fibrinogen 1 hour post that and then repeat it today in the afternoon will schedule for 1400 DIC panel along with CBC  Will hold aspirin well that is being worked up  · On the previous admission patient was not on any heparin products this admission she was initially on Lovenox products for 2 days  · Taylor Simpers stopped for now  · After discussion with Dr Carole Ramos- hematology at [facility] after the labs in the afternoon decision was made to transfer the patient to [facility] to have Hematology evaluate daily and do further diagnostics and management if needed as no Hematology available here    Discussed with patient and son okay with it  Patient has been accepted by jenny Adams     Parkinson's disease   [address] [place] [address]  C/w Sinemet/Xadago  PT/OT as tolerated    History of transcatheter aortic valve replacement (TAVR)  Assessment & Plan  History of TAVR @ [address] recently on July 9003 with 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  Saw CT surgery on 8/31/21 w/ recommendation to stop Plavix due to patient complaints of GI upset  Serial outpatient followups- will hold aspirin for now secondary to workup of DIC        Medical Problems     Resolved Problems  Date Reviewed: 9/8/2021        Resolved    Hyperbilirubinemia 9/7/2021     Resolved by  Kaitlin Cabezas MD              Discharging Physician / Practitioner: Kaitlin Cabezas MD  PCP: Lyric Wilder  Admission Date:   Admission Orders (From admission, onward)     Ordered        09/03/21 1849  Inpatient Admission  Once                   Discharge Date: 09/09/2021  Consultations During Hospital Stay:  · General surgery  · Hematology at [facility] over the phone  · Gastroenterology    Procedures Performed:   · EGD- 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  Large sliding hiatal hernia (type I hiatal hernia) without Toula Jubilee lesions present - GE junction 25 cm from the incisors, diaphragmatic impression 35 cm from the incisors  Performed biopsies to rule out H  pylori in the body of the stomach, incisura and antrum  The duodenal bulb and 2nd part of the duodenum appeared normal   Performed biopsies to rule out celiac disease in the duodenal bulb and 2nd part of the duodenum    Significant Findings / Test Results:   · - EGD see above  · Right upper quadrant ultrasound- gallbladder sludge but no evidence of cholecystitis or cholelithiasis  · CT abdomen and pelvis and chest- suspicious of acute cholecystitis- Bilateral adnexal cysts measuring 3 7 x 2 7 cm on the right and 1 8 x 1 8 cm on the left    Further evaluation with nonemergent ultrasound given the postmenopausal status recommended  Incidental Findings:   · See above    Test Results Pending at Discharge (will require follow up): · None     Outpatient Tests Requested:  · aS per Nando    Complications:  None    Reason for Admission:  Ongoing nausea and vomiting    Hospital Course:   Danie Hood is a 80 y o  female patient who originally presented to the hospital on 9/3/2021 due to ongoing nausea and vomiting since July  No etiology was found she underwent EGD with findings above which should not cause her ongoing nausea and vomiting she also presented with recurrent anemia requiring 1 PRBC transition actually progressive thrombocytopenia, which also has been workup this admission as well  She had right upper quadrant ultrasound done which revealed gallbladder sludge but negative for any acute cholecystitis  Patient was started on Remeron states 7 5 mg at night to help with the nausea and also appetite as recommended by Gastroenterology and on clear liquid diet has improved  In terms of her anemia although workup was compliant with acute hemolytic anemia she did have a 2D echo done after her valve replacement on August 30th which had mild talha valvular leak which I had discussion with her cardiothoracic team if that could cause her hemolytic anemia as it is a rare complication and due to the leak being mild they did not think so    Now the hemolysis pattern positive for schistocytes ongoing thrombocytopenia worsening with fibrinogen being low compliant with DIC, ?ttp although no neurological symptoms or Nicholas I but she does have some purpura and petechiae on her chest and back had discussion with the Hematology at [facility] Dr Jose Kong transfusion of 2 cryoprecipitate done, infectious workup did not reveal any concrete infection the CT abdomen and pelvis revealed acute cholecystitis with negative abdominal pain no fevers and negative for upper quadrant had surgery evaluate do not suspect cholecystitis clinically hold off on any antibiotics will proceed with HIDA scan for completeness to see if it is positive  After discussion with Dr Arleth King after the labs with no complete etiology of her ongoing hematological process of DIC hemolytic anemia prefers to have patient transferred to [facility] for evaluation continue management investigation and monitoring has been accepted by [facility] Dr Nikki Mckeon  Please see above list of diagnoses and related plan for additional information  Condition at Discharge: stable    Discharge Day Visit / Exam:   * Please refer to separate progress note for these details *    Discussion with Family: Updated  (son) via phone  Discharge instructions/Information to patient and family:   See after visit summary for information provided to patient and family  Provisions for Follow-Up Care:  See after visit summary for information related to follow-up care and any pertinent home health orders  Disposition:   [address] Transfer to [facility]    Planned Readmission: no     Discharge Statement:  I spent >35 minutes discharging the patient  This time was spent on the day of discharge  I had direct contact with the patient on the day of discharge  Greater than 50% of the total time was spent examining patient, answering all patient questions, arranging and discussing plan of care with patient as well as directly providing post-discharge instructions  Additional time then spent on discharge activities  Discharge Medications:  See after visit summary for reconciled discharge medications provided to patient and/or family        **Please Note: This note may have been constructed using a voice recognition system**

## 2021-09-08 NOTE — PLAN OF CARE
Problem: Potential for Falls  Goal: Patient will remain free of falls  Description: INTERVENTIONS:  - Educate patient/family on patient safety including physical limitations  - Instruct patient to call for assistance with activity   - Consult OT/PT to assist with strengthening/mobility   - Keep Call bell within reach  - Keep bed low and locked with side rails adjusted as appropriate  - Keep care items and personal belongings within reach  - Initiate and maintain comfort rounds  - Make Fall Risk Sign visible to staff  - Apply yellow socks and bracelet for high fall risk patients  - Consider moving patient to room near nurses station  Outcome: Progressing     Problem: MOBILITY - ADULT  Goal: Maintain or return to baseline ADL function  Description: INTERVENTIONS:  -  Assess patient's ability to carry out ADLs; assess patient's baseline for ADL function and identify physical deficits which impact ability to perform ADLs (bathing, care of mouth/teeth, toileting, grooming, dressing, etc )  - Assess/evaluate cause of self-care deficits   - Assess range of motion  - Assess patient's mobility; develop plan if impaired  - Assess patient's need for assistive devices and provide as appropriate  - Encourage maximum independence but intervene and supervise when necessary  - Involve family in performance of ADLs  - Assess for home care needs following discharge   - Consider OT consult to assist with ADL evaluation and planning for discharge  - Provide patient education as appropriate  Outcome: Progressing  Goal: Maintains/Returns to pre admission functional level  Description: INTERVENTIONS:  - Perform BMAT or MOVE assessment daily    - Set and communicate daily mobility goal to care team and patient/family/caregiver     - Collaborate with rehabilitation services on mobility goals if consulted  - Record patient progress and toleration of activity level   Outcome: Progressing     Problem: Nutrition/Hydration-ADULT  Goal: Nutrient/Hydration intake appropriate for improving, restoring or maintaining nutritional needs  Description: Monitor and assess patient's nutrition/hydration status for malnutrition  Collaborate with interdisciplinary team and initiate plan and interventions as ordered  Monitor patient's weight and dietary intake as ordered or per policy  Utilize nutrition screening tool and intervene as necessary  Determine patient's food preferences and provide high-protein, high-caloric foods as appropriate       INTERVENTIONS:  - Monitor oral intake, urinary output, labs, and treatment plans  - Assess nutrition and hydration status and recommend course of action  - Evaluate amount of meals eaten  - Assist patient with eating if necessary   - Allow adequate time for meals  - Recommend/ encourage appropriate diets, oral nutritional supplements, and vitamin/mineral supplements  - Order, calculate, and assess calorie counts as needed  - Recommend, monitor, and adjust tube feedings and TPN/PPN based on assessed needs  - Assess need for intravenous fluids  - Provide specific nutrition/hydration education as appropriate  - Include patient/family/caregiver in decisions related to nutrition  Outcome: Progressing     Problem: PAIN - ADULT  Goal: Verbalizes/displays adequate comfort level or baseline comfort level  Description: Interventions:  - Encourage patient to monitor pain and request assistance  - Assess pain using appropriate pain scale  - Administer analgesics based on type and severity of pain and evaluate response  - Implement non-pharmacological measures as appropriate and evaluate response  - Consider cultural and social influences on pain and pain management  - Notify physician/advanced practitioner if interventions unsuccessful or patient reports new pain  Outcome: Progressing     Problem: INFECTION - ADULT  Goal: Absence or prevention of progression during hospitalization  Description: INTERVENTIONS:  - Assess and monitor for signs and symptoms of infection  - Monitor lab/diagnostic results  - Monitor all insertion sites, i e  indwelling lines, tubes, and drains  - Monitor endotracheal if appropriate and nasal secretions for changes in amount and color  - Ransom appropriate cooling/warming therapies per order  - Administer medications as ordered  - Instruct and encourage patient and family to use good hand hygiene technique  - Identify and instruct in appropriate isolation precautions for identified infection/condition  Outcome: Progressing     Problem: SAFETY ADULT  Goal: Patient will remain free of falls  Description: INTERVENTIONS:  - Educate patient/family on patient safety including physical limitations  - Instruct patient to call for assistance with activity   - Consult OT/PT to assist with strengthening/mobility   - Keep Call bell within reach  - Keep bed low and locked with side rails adjusted as appropriate  - Keep care items and personal belongings within reach  - Initiate and maintain comfort rounds  - Make Fall Risk Sign visible to staff  - Apply yellow socks and bracelet for high fall risk patients  - Consider moving patient to room near nurses station  Outcome: Progressing  Goal: Maintain or return to baseline ADL function  Description: INTERVENTIONS:  -  Assess patient's ability to carry out ADLs; assess patient's baseline for ADL function and identify physical deficits which impact ability to perform ADLs (bathing, care of mouth/teeth, toileting, grooming, dressing, etc )  - Assess/evaluate cause of self-care deficits   - Assess range of motion  - Assess patient's mobility; develop plan if impaired  - Assess patient's need for assistive devices and provide as appropriate  - Encourage maximum independence but intervene and supervise when necessary  - Involve family in performance of ADLs  - Assess for home care needs following discharge   - Consider OT consult to assist with ADL evaluation and planning for discharge  - Provide patient education as appropriate  Outcome: Progressing  Goal: Maintains/Returns to pre admission functional level  Description: INTERVENTIONS:  - Perform BMAT or MOVE assessment daily    - Set and communicate daily mobility goal to care team and patient/family/caregiver  - Collaborate with rehabilitation services on mobility goals if consulted  - Out of bed for toileting  - Record patient progress and toleration of activity level   Outcome: Progressing     Problem: DISCHARGE PLANNING  Goal: Discharge to home or other facility with appropriate resources  Description: INTERVENTIONS:  - Identify barriers to discharge w/patient and caregiver  - Arrange for needed discharge resources and transportation as appropriate  - Identify discharge learning needs (meds, wound care, etc )  - Arrange for interpretive services to assist at discharge as needed  - Refer to Case Management Department for coordinating discharge planning if the patient needs post-hospital services based on physician/advanced practitioner order or complex needs related to functional status, cognitive ability, or social support system  Outcome: Progressing     Problem: Knowledge Deficit  Goal: Patient/family/caregiver demonstrates understanding of disease process, treatment plan, medications, and discharge instructions  Description: Complete learning assessment and assess knowledge base    Interventions:  - Provide teaching at level of understanding  - Provide teaching via preferred learning methods  Outcome: Progressing     Problem: GASTROINTESTINAL - ADULT  Goal: Minimal or absence of nausea and/or vomiting  Description: INTERVENTIONS:  - Administer IV fluids if ordered to ensure adequate hydration  - Maintain NPO status until nausea and vomiting are resolved  - Nasogastric tube if ordered  - Administer ordered antiemetic medications as needed  - Provide nonpharmacologic comfort measures as appropriate  - Advance diet as tolerated, if ordered  - Consider nutrition services referral to assist patient with adequate nutrition and appropriate food choices  Outcome: Progressing  Goal: Maintains adequate nutritional intake  Description: INTERVENTIONS:  - Monitor percentage of each meal consumed  - Identify factors contributing to decreased intake, treat as appropriate  - Assist with meals as needed  - Monitor I&O, weight, and lab values if indicated  - Obtain nutrition services referral as needed  Outcome: Progressing     Problem: METABOLIC, FLUID AND ELECTROLYTES - ADULT  Goal: Electrolytes maintained within normal limits  Description: INTERVENTIONS:  - Monitor labs and assess patient for signs and symptoms of electrolyte imbalances  - Administer electrolyte replacement as ordered  - Monitor response to electrolyte replacements, including repeat lab results as appropriate  - Instruct patient on fluid and nutrition as appropriate  Outcome: Progressing  Goal: Fluid balance maintained  Description: INTERVENTIONS:  - Monitor labs   - Monitor I/O and WT  - Instruct patient on fluid and nutrition as appropriate  - Assess for signs & symptoms of volume excess or deficit  Outcome: Progressing     Problem: Prexisting or High Potential for Compromised Skin Integrity  Goal: Skin integrity is maintained or improved  Description: INTERVENTIONS:  - Identify patients at risk for skin breakdown  - Assess and monitor skin integrity  - Assess and monitor nutrition and hydration status  - Monitor labs   - Assess for incontinence   - Turn and reposition patient  - Assist with mobility/ambulation  - Relieve pressure over bony prominences  - Avoid friction and shearing  - Provide appropriate hygiene as needed including keeping skin clean and dry  - Evaluate need for skin moisturizer/barrier cream  - Collaborate with interdisciplinary team   - Patient/family teaching  - Consider wound care consult   Outcome: Progressing

## 2021-09-08 NOTE — ASSESSMENT & PLAN NOTE
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 low haptoglobin elevated LDH elevated unconjugated bilirubinemia  Shama test is negative unknown etiology at this time kody pending, rf ordered  · On previous CT abdomen and pelvis there is no evidence of hypersplenism  · No evidence of bleed and the EGD although she never had a colonoscopy her previous occult were negative and she had no iron deficiency on the iron panel no recent medicines to cause it no evidence of any infection evident at this time  She has been transfused during this admission as well    · Transfuse if less than 7 5  · Discussing and managing the case with Hematology-Oncology from [facility]  · Patient had a bioprosthetic aortic valve replacement on June 29, 2021 at [facility] she had an 2D echo done on August 30, 2021 which showed a mild perivalvular leak which I have reached out in discussed with her cardiothoracic surgeon group at [facility] with leak being so mild they do not think this is attributing to acute hemolytic anemia- being transferred to [facility]

## 2021-09-08 NOTE — ASSESSMENT & PLAN NOTE
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 low haptoglobin elevated LDH elevated unconjugated bilirubinemia  Shama test is negative unknown etiology at this time kody pending, rf ordered  · On previous CT abdomen and pelvis there is no evidence of hypersplenism  · No evidence of bleed and the EGD although she never had a colonoscopy her previous occult were negative and she had no iron deficiency on the iron panel no recent medicines to cause it no evidence of any infection evident at this time  She has been transfused during this admission as well    · Transfuse if less than 7 5  · Discussing and managing the case with Hematology-Oncology from [facility]  · Patient had a bioprosthetic aortic valve replacement on June 29, 2021 at [facility] she had an 2D echo done on August 30, 2021 which showed a mild perivalvular leak which I have reached out in discussed with her cardiothoracic surgeon group at [facility] with leak being so mild they do not think this is attributing to acute hemolytic anemia

## 2021-09-08 NOTE — ASSESSMENT & PLAN NOTE
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  · Large sliding hiatal hernia (type I hiatal hernia) without Liliane Simmer lesions present - GE junction 25 cm from the incisors, diaphragmatic impression 35 cm from the incisors  · C/w IV PPI regimen -  · No abdominal pain  · PRN emesis control  · No obvious ulcers found of the intractable nausea vomiting as discussed with Gastroenterology started Remeron 7 5 mg at night patient has been tolerating clears without any report of nausea today still poor bowel movement will give her a dose of Dulcolax and then to surgical soft diet and see how she does looks like she has used Reglan 1 time today    I discussed with surgery they do not think this is cholecystitis this was found on the CT abdomen and pelvis as no cholecystitis 5 and on right upper quadrant and no clinical symptoms as well there was just sludge she will proceed with HIDA scan to ensure for completeness tomorrow will be NPO at midnight she will be transferred to [place] either [facility] or tomorrow for Hematology further evaluation and management

## 2021-09-09 ENCOUNTER — APPOINTMENT (INPATIENT)
Dept: NUCLEAR MEDICINE | Facility: HOSPITAL | Age: 86
DRG: 808 | End: 2021-09-09
Payer: COMMERCIAL

## 2021-09-09 ENCOUNTER — HOSPITAL ENCOUNTER (INPATIENT)
Facility: HOSPITAL | Age: 86
LOS: 5 days | Discharge: HOSPICE | DRG: 809 | End: 2021-09-14
Attending: INTERNAL MEDICINE | Admitting: INTERNAL MEDICINE
Payer: COMMERCIAL

## 2021-09-09 VITALS
OXYGEN SATURATION: 97 % | RESPIRATION RATE: 20 BRPM | WEIGHT: 98 LBS | BODY MASS INDEX: 16.33 KG/M2 | DIASTOLIC BLOOD PRESSURE: 62 MMHG | TEMPERATURE: 97.9 F | HEIGHT: 65 IN | HEART RATE: 75 BPM | SYSTOLIC BLOOD PRESSURE: 120 MMHG

## 2021-09-09 DIAGNOSIS — D61.818 PANCYTOPENIA (HCC): Primary | ICD-10-CM

## 2021-09-09 DIAGNOSIS — G20 PARKINSON'S DISEASE (HCC): ICD-10-CM

## 2021-09-09 DIAGNOSIS — R11.2 INTRACTABLE NAUSEA AND VOMITING: ICD-10-CM

## 2021-09-09 DIAGNOSIS — E43 SEVERE PROTEIN-CALORIE MALNUTRITION (HCC): ICD-10-CM

## 2021-09-09 PROBLEM — D58.9 HEMOLYTIC ANEMIA (HCC): Status: ACTIVE | Noted: 2021-09-08

## 2021-09-09 LAB
ABO GROUP BLD BPU: NORMAL
ABO GROUP BLD BPU: NORMAL
ACANTHOCYTES BLD QL SMEAR: PRESENT
ACANTHOCYTES BLD QL SMEAR: PRESENT
ALBUMIN SERPL BCP-MCNC: 2.9 G/DL (ref 3.5–5)
ALP SERPL-CCNC: 112 U/L (ref 46–116)
ALT SERPL W P-5'-P-CCNC: 10 U/L (ref 12–78)
ANION GAP SERPL CALCULATED.3IONS-SCNC: 7 MMOL/L (ref 4–13)
ANISOCYTOSIS BLD QL SMEAR: PRESENT
ANISOCYTOSIS BLD QL SMEAR: PRESENT
APTT PPP: 29 SECONDS (ref 23–37)
AST SERPL W P-5'-P-CCNC: 45 U/L (ref 5–45)
BASOPHILS # BLD MANUAL: 0.03 THOUSAND/UL (ref 0–0.1)
BASOPHILS NFR MAR MANUAL: 1 % (ref 0–1)
BILIRUB SERPL-MCNC: 1.56 MG/DL (ref 0.2–1)
BLD SMEAR INTERP: NORMAL
BPU ID: NORMAL
BPU ID: NORMAL
BUN SERPL-MCNC: 16 MG/DL (ref 5–25)
CALCIUM ALBUM COR SERPL-MCNC: 8.9 MG/DL (ref 8.3–10.1)
CALCIUM SERPL-MCNC: 8 MG/DL (ref 8.3–10.1)
CHLORIDE SERPL-SCNC: 101 MMOL/L (ref 100–108)
CO2 SERPL-SCNC: 30 MMOL/L (ref 21–32)
CREAT SERPL-MCNC: 0.63 MG/DL (ref 0.6–1.3)
D DIMER PPP FEU-MCNC: 2.77 UG/ML FEU
EOSINOPHIL # BLD MANUAL: 0.03 THOUSAND/UL (ref 0–0.4)
EOSINOPHIL NFR BLD MANUAL: 1 % (ref 0–6)
ERYTHROCYTE [DISTWIDTH] IN BLOOD BY AUTOMATED COUNT: 20.6 % (ref 11.6–15.1)
FIBRINOGEN PPP-MCNC: 202 MG/DL (ref 227–495)
GFR SERPL CREATININE-BSD FRML MDRD: 81 ML/MIN/1.73SQ M
GLUCOSE SERPL-MCNC: 70 MG/DL (ref 65–140)
HCT VFR BLD AUTO: 23.5 % (ref 34.8–46.1)
HELMET CELLS BLD QL SMEAR: PRESENT
HELMET CELLS BLD QL SMEAR: PRESENT
HGB BLD-MCNC: 7.7 G/DL (ref 11.5–15.4)
INR PPP: 1.1 (ref 0.84–1.19)
LDH SERPL-CCNC: 1580 U/L (ref 81–234)
LYMPHOCYTES # BLD AUTO: 0.52 THOUSAND/UL (ref 0.6–4.47)
LYMPHOCYTES # BLD AUTO: 16 % (ref 14–44)
MCH RBC QN AUTO: 30.8 PG (ref 26.8–34.3)
MCHC RBC AUTO-ENTMCNC: 32.8 G/DL (ref 31.4–37.4)
MCV RBC AUTO: 94 FL (ref 82–98)
MONOCYTES # BLD AUTO: 0.06 THOUSAND/UL (ref 0–1.22)
MONOCYTES NFR BLD: 2 % (ref 4–12)
NEUTROPHILS # BLD MANUAL: 2.59 THOUSAND/UL (ref 1.85–7.62)
NEUTS BAND NFR BLD MANUAL: 1 % (ref 0–8)
NEUTS SEG NFR BLD AUTO: 79 % (ref 43–75)
OVALOCYTES BLD QL SMEAR: PRESENT
OVALOCYTES BLD QL SMEAR: PRESENT
PLATELET # BLD AUTO: 64 THOUSANDS/UL (ref 149–390)
PLATELET BLD QL SMEAR: ABNORMAL
PLATELET BLD QL SMEAR: ABNORMAL
POLYCHROMASIA BLD QL SMEAR: PRESENT
POLYCHROMASIA BLD QL SMEAR: PRESENT
POTASSIUM SERPL-SCNC: 3.3 MMOL/L (ref 3.5–5.3)
PROT SERPL-MCNC: 5.1 G/DL (ref 6.4–8.2)
PROTHROMBIN TIME: 14.1 SECONDS (ref 11.6–14.5)
RBC # BLD AUTO: 2.5 MILLION/UL (ref 3.81–5.12)
RBC MORPH BLD: PRESENT
RBC MORPH BLD: PRESENT
RHEUMATOID FACT SER QL LA: NEGATIVE
SCHISTOCYTES BLD QL SMEAR: PRESENT
SCHISTOCYTES BLD QL SMEAR: PRESENT
SODIUM SERPL-SCNC: 138 MMOL/L (ref 136–145)
UNIT DISPENSE STATUS: NORMAL
UNIT DISPENSE STATUS: NORMAL
UNIT PRODUCT CODE: NORMAL
UNIT PRODUCT CODE: NORMAL
UNIT PRODUCT VOLUME: 102 ML
UNIT PRODUCT VOLUME: 104 ML
UNIT RH: NORMAL
UNIT RH: NORMAL
WBC # BLD AUTO: 3.24 THOUSAND/UL (ref 4.31–10.16)

## 2021-09-09 PROCEDURE — 80053 COMPREHEN METABOLIC PANEL: CPT | Performed by: FAMILY MEDICINE

## 2021-09-09 PROCEDURE — 85027 COMPLETE CBC AUTOMATED: CPT | Performed by: FAMILY MEDICINE

## 2021-09-09 PROCEDURE — 83615 LACTATE (LD) (LDH) ENZYME: CPT | Performed by: FAMILY MEDICINE

## 2021-09-09 PROCEDURE — 85610 PROTHROMBIN TIME: CPT | Performed by: FAMILY MEDICINE

## 2021-09-09 PROCEDURE — NC001 PR NO CHARGE: Performed by: PHYSICIAN ASSISTANT

## 2021-09-09 PROCEDURE — 85384 FIBRINOGEN ACTIVITY: CPT | Performed by: FAMILY MEDICINE

## 2021-09-09 PROCEDURE — A9537 TC99M MEBROFENIN: HCPCS

## 2021-09-09 PROCEDURE — 82272 OCCULT BLD FECES 1-3 TESTS: CPT | Performed by: FAMILY MEDICINE

## 2021-09-09 PROCEDURE — 99223 1ST HOSP IP/OBS HIGH 75: CPT | Performed by: INTERNAL MEDICINE

## 2021-09-09 PROCEDURE — 85379 FIBRIN DEGRADATION QUANT: CPT | Performed by: FAMILY MEDICINE

## 2021-09-09 PROCEDURE — G1004 CDSM NDSC: HCPCS

## 2021-09-09 PROCEDURE — NC001 PR NO CHARGE: Performed by: FAMILY MEDICINE

## 2021-09-09 PROCEDURE — 85730 THROMBOPLASTIN TIME PARTIAL: CPT | Performed by: FAMILY MEDICINE

## 2021-09-09 PROCEDURE — 85007 BL SMEAR W/DIFF WBC COUNT: CPT | Performed by: FAMILY MEDICINE

## 2021-09-09 PROCEDURE — 78226 HEPATOBILIARY SYSTEM IMAGING: CPT

## 2021-09-09 RX ORDER — ATORVASTATIN CALCIUM 80 MG/1
80 TABLET, FILM COATED ORAL DAILY
Status: DISCONTINUED | OUTPATIENT
Start: 2021-09-10 | End: 2021-09-12

## 2021-09-09 RX ORDER — ALPRAZOLAM 0.25 MG/1
0.25 TABLET ORAL 3 TIMES DAILY PRN
Status: DISCONTINUED | OUTPATIENT
Start: 2021-09-09 | End: 2021-09-13

## 2021-09-09 RX ORDER — AMOXICILLIN 250 MG
1 CAPSULE ORAL
Status: DISCONTINUED | OUTPATIENT
Start: 2021-09-09 | End: 2021-09-12

## 2021-09-09 RX ORDER — PANTOPRAZOLE SODIUM 40 MG/1
40 TABLET, DELAYED RELEASE ORAL
Status: DISCONTINUED | OUTPATIENT
Start: 2021-09-10 | End: 2021-09-14 | Stop reason: HOSPADM

## 2021-09-09 RX ORDER — CARBIDOPA AND LEVODOPA 50; 200 MG/1; MG/1
1 TABLET, EXTENDED RELEASE ORAL
Status: DISCONTINUED | OUTPATIENT
Start: 2021-09-10 | End: 2021-09-14 | Stop reason: HOSPADM

## 2021-09-09 RX ORDER — MIRTAZAPINE 15 MG/1
7.5 TABLET, FILM COATED ORAL
Status: DISCONTINUED | OUTPATIENT
Start: 2021-09-09 | End: 2021-09-14 | Stop reason: HOSPADM

## 2021-09-09 RX ORDER — ACETAMINOPHEN 325 MG/1
650 TABLET ORAL EVERY 6 HOURS PRN
Status: DISCONTINUED | OUTPATIENT
Start: 2021-09-09 | End: 2021-09-14 | Stop reason: HOSPADM

## 2021-09-09 RX ORDER — XYLITOL/YERBA SANTA
5 AEROSOL, SPRAY WITH PUMP (ML) MUCOUS MEMBRANE 4 TIMES DAILY PRN
Status: DISCONTINUED | OUTPATIENT
Start: 2021-09-09 | End: 2021-09-14 | Stop reason: HOSPADM

## 2021-09-09 RX ORDER — POLYETHYLENE GLYCOL 3350 17 G/17G
17 POWDER, FOR SOLUTION ORAL DAILY
Status: DISCONTINUED | OUTPATIENT
Start: 2021-09-10 | End: 2021-09-12

## 2021-09-09 RX ORDER — METOCLOPRAMIDE HYDROCHLORIDE 5 MG/ML
10 INJECTION INTRAMUSCULAR; INTRAVENOUS EVERY 6 HOURS PRN
Status: DISCONTINUED | OUTPATIENT
Start: 2021-09-09 | End: 2021-09-14 | Stop reason: HOSPADM

## 2021-09-09 RX ADMIN — METOPROLOL TARTRATE 25 MG: 25 TABLET, FILM COATED ORAL at 22:03

## 2021-09-09 RX ADMIN — SAFINAMIDE MESYLATE 100 MG: 100 TABLET, FILM COATED ORAL at 09:56

## 2021-09-09 RX ADMIN — METOPROLOL TARTRATE 25 MG: 25 TABLET, FILM COATED ORAL at 09:56

## 2021-09-09 RX ADMIN — MIRTAZAPINE 7.5 MG: 15 TABLET, FILM COATED ORAL at 22:03

## 2021-09-09 RX ADMIN — CARBIDOPA AND LEVODOPA 1 TABLET: 50; 200 TABLET, EXTENDED RELEASE ORAL at 11:00

## 2021-09-09 RX ADMIN — METOCLOPRAMIDE HYDROCHLORIDE 10 MG: 5 INJECTION INTRAMUSCULAR; INTRAVENOUS at 22:04

## 2021-09-09 RX ADMIN — CARBIDOPA AND LEVODOPA 1 TABLET: 50; 200 TABLET, EXTENDED RELEASE ORAL at 16:36

## 2021-09-09 RX ADMIN — ATORVASTATIN CALCIUM 80 MG: 40 TABLET, FILM COATED ORAL at 09:56

## 2021-09-09 RX ADMIN — ALPRAZOLAM 0.25 MG: 0.25 TABLET ORAL at 22:04

## 2021-09-09 RX ADMIN — CARBIDOPA AND LEVODOPA 1 TABLET: 50; 200 TABLET, EXTENDED RELEASE ORAL at 20:07

## 2021-09-09 RX ADMIN — CARBIDOPA AND LEVODOPA 1 TABLET: 50; 200 TABLET, EXTENDED RELEASE ORAL at 00:03

## 2021-09-09 RX ADMIN — CARBIDOPA AND LEVODOPA 1 TABLET: 50; 200 TABLET, EXTENDED RELEASE ORAL at 07:27

## 2021-09-09 NOTE — PLAN OF CARE
Problem: Potential for Falls  Goal: Patient will remain free of falls  Description: INTERVENTIONS:  - Educate patient/family on patient safety including physical limitations  - Instruct patient to call for assistance with activity   - Consult OT/PT to assist with strengthening/mobility   - Keep Call bell within reach  - Keep bed low and locked with side rails adjusted as appropriate  - Keep care items and personal belongings within reach  - Initiate and maintain comfort rounds  - Make Fall Risk Sign visible to staff  - Offer Toileting every   Hours, in advance of need  - Initiate/Maintain   alarm  - Obtain necessary fall risk management equipment:   - Apply yellow socks and bracelet for high fall risk patients  - Consider moving patient to room near nurses station  Outcome: Progressing     Problem: MOBILITY - ADULT  Goal: Maintain or return to baseline ADL function  Description: INTERVENTIONS:  -  Assess patient's ability to carry out ADLs; assess patient's baseline for ADL function and identify physical deficits which impact ability to perform ADLs (bathing, care of mouth/teeth, toileting, grooming, dressing, etc )  - Assess/evaluate cause of self-care deficits   - Assess range of motion  - Assess patient's mobility; develop plan if impaired  - Assess patient's need for assistive devices and provide as appropriate  - Encourage maximum independence but intervene and supervise when necessary  - Involve family in performance of ADLs  - Assess for home care needs following discharge   - Consider OT consult to assist with ADL evaluation and planning for discharge  - Provide patient education as appropriate  Outcome: Progressing  Goal: Maintains/Returns to pre admission functional level  Description: INTERVENTIONS:  - Perform BMAT or MOVE assessment daily    - Set and communicate daily mobility goal to care team and patient/family/caregiver     - Collaborate with rehabilitation services on mobility goals if consulted  - Perform Range of Motion   times a day  - Reposition patient every   hours  - Dangle patient   times a day  - Stand patient   times a day  - Ambulate patient   times a day  - Out of bed to chair   times a day   - Out of bed for meals   times a day  - Out of bed for toileting  - Record patient progress and toleration of activity level   Outcome: Progressing     Problem: Nutrition/Hydration-ADULT  Goal: Nutrient/Hydration intake appropriate for improving, restoring or maintaining nutritional needs  Description: Monitor and assess patient's nutrition/hydration status for malnutrition  Collaborate with interdisciplinary team and initiate plan and interventions as ordered  Monitor patient's weight and dietary intake as ordered or per policy  Utilize nutrition screening tool and intervene as necessary  Determine patient's food preferences and provide high-protein, high-caloric foods as appropriate       INTERVENTIONS:  - Monitor oral intake, urinary output, labs, and treatment plans  - Assess nutrition and hydration status and recommend course of action  - Evaluate amount of meals eaten  - Assist patient with eating if necessary   - Allow adequate time for meals  - Recommend/ encourage appropriate diets, oral nutritional supplements, and vitamin/mineral supplements  - Order, calculate, and assess calorie counts as needed  - Recommend, monitor, and adjust tube feedings and TPN/PPN based on assessed needs  - Assess need for intravenous fluids  - Provide specific nutrition/hydration education as appropriate  - Include patient/family/caregiver in decisions related to nutrition  Outcome: Progressing     Problem: PAIN - ADULT  Goal: Verbalizes/displays adequate comfort level or baseline comfort level  Description: Interventions:  - Encourage patient to monitor pain and request assistance  - Assess pain using appropriate pain scale  - Administer analgesics based on type and severity of pain and evaluate response  - Implement non-pharmacological measures as appropriate and evaluate response  - Consider cultural and social influences on pain and pain management  - Notify physician/advanced practitioner if interventions unsuccessful or patient reports new pain  Outcome: Progressing     Problem: INFECTION - ADULT  Goal: Absence or prevention of progression during hospitalization  Description: INTERVENTIONS:  - Assess and monitor for signs and symptoms of infection  - Monitor lab/diagnostic results  - Monitor all insertion sites, i e  indwelling lines, tubes, and drains  - Monitor endotracheal if appropriate and nasal secretions for changes in amount and color  - [place] appropriate cooling/warming therapies per order  - Administer medications as ordered  - Instruct and encourage patient and family to use good hand hygiene technique  - Identify and instruct in appropriate isolation precautions for identified infection/condition  Outcome: Progressing     Problem: SAFETY ADULT  Goal: Patient will remain free of falls  Description: INTERVENTIONS:  - Educate patient/family on patient safety including physical limitations  - Instruct patient to call for assistance with activity   - Consult OT/PT to assist with strengthening/mobility   - Keep Call bell within reach  - Keep bed low and locked with side rails adjusted as appropriate  - Keep care items and personal belongings within reach  - Initiate and maintain comfort rounds  - Make Fall Risk Sign visible to staff  - Offer Toileting every   Hours, in advance of need  - Initiate/Maintain   alarm  - Obtain necessary fall risk management equipment:     - Apply yellow socks and bracelet for high fall risk patients  - Consider moving patient to room near nurses station  Outcome: Progressing  Goal: Maintain or return to baseline ADL function  Description: INTERVENTIONS:  -  Assess patient's ability to carry out ADLs; assess patient's baseline for ADL function and identify physical deficits which impact ability to perform ADLs (bathing, care of mouth/teeth, toileting, grooming, dressing, etc )  - Assess/evaluate cause of self-care deficits   - Assess range of motion  - Assess patient's mobility; develop plan if impaired  - Assess patient's need for assistive devices and provide as appropriate  - Encourage maximum independence but intervene and supervise when necessary  - Involve family in performance of ADLs  - Assess for home care needs following discharge   - Consider OT consult to assist with ADL evaluation and planning for discharge  - Provide patient education as appropriate  Outcome: Progressing  Goal: Maintains/Returns to pre admission functional level  Description: INTERVENTIONS:  - Perform BMAT or MOVE assessment daily    - Set and communicate daily mobility goal to care team and patient/family/caregiver  - Collaborate with rehabilitation services on mobility goals if consulted  - Perform Range of Motion   times a day  - Reposition patient every   hours  - Dangle patient   times a day  - Stand patient   times a day  - Ambulate patient   times a day  - Out of bed to chair   times a day   - Out of bed for meals     times a day  - Out of bed for toileting  - Record patient progress and toleration of activity level   Outcome: Progressing     Problem: DISCHARGE PLANNING  Goal: Discharge to home or other facility with appropriate resources  Description: INTERVENTIONS:  - Identify barriers to discharge w/patient and caregiver  - Arrange for needed discharge resources and transportation as appropriate  - Identify discharge learning needs (meds, wound care, etc )  - Arrange for interpretive services to assist at discharge as needed  - Refer to Case Management Department for coordinating discharge planning if the patient needs post-hospital services based on physician/advanced practitioner order or complex needs related to functional status, cognitive ability, or social support system  Outcome: Progressing Problem: Knowledge Deficit  Goal: Patient/family/caregiver demonstrates understanding of disease process, treatment plan, medications, and discharge instructions  Description: Complete learning assessment and assess knowledge base    Interventions:  - Provide teaching at level of understanding  - Provide teaching via preferred learning methods  Outcome: Progressing     Problem: GASTROINTESTINAL - ADULT  Goal: Minimal or absence of nausea and/or vomiting  Description: INTERVENTIONS:  - Administer IV fluids if ordered to ensure adequate hydration  - Maintain NPO status until nausea and vomiting are resolved  - Nasogastric tube if ordered  - Administer ordered antiemetic medications as needed  - Provide nonpharmacologic comfort measures as appropriate  - Advance diet as tolerated, if ordered  - Consider nutrition services referral to assist patient with adequate nutrition and appropriate food choices  Outcome: Progressing  Goal: Maintains adequate nutritional intake  Description: INTERVENTIONS:  - Monitor percentage of each meal consumed  - Identify factors contributing to decreased intake, treat as appropriate  - Assist with meals as needed  - Monitor I&O, weight, and lab values if indicated  - Obtain nutrition services referral as needed  Outcome: Progressing     Problem: METABOLIC, FLUID AND ELECTROLYTES - ADULT  Goal: Electrolytes maintained within normal limits  Description: INTERVENTIONS:  - Monitor labs and assess patient for signs and symptoms of electrolyte imbalances  - Administer electrolyte replacement as ordered  - Monitor response to electrolyte replacements, including repeat lab results as appropriate  - Instruct patient on fluid and nutrition as appropriate  Outcome: Progressing  Goal: Fluid balance maintained  Description: INTERVENTIONS:  - Monitor labs   - Monitor I/O and WT  - Instruct patient on fluid and nutrition as appropriate  - Assess for signs & symptoms of volume excess or deficit  Outcome: Progressing     Problem: Prexisting or High Potential for Compromised Skin Integrity  Goal: Skin integrity is maintained or improved  Description: INTERVENTIONS:  - Identify patients at risk for skin breakdown  - Assess and monitor skin integrity  - Assess and monitor nutrition and hydration status  - Monitor labs   - Assess for incontinence   - Turn and reposition patient  - Assist with mobility/ambulation  - Relieve pressure over bony prominences  - Avoid friction and shearing  - Provide appropriate hygiene as needed including keeping skin clean and dry  - Evaluate need for skin moisturizer/barrier cream  - Collaborate with interdisciplinary team   - Patient/family teaching  - Consider wound care consult   Outcome: Progressing

## 2021-09-09 NOTE — ASSESSMENT & PLAN NOTE
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 low haptoglobin elevated LDH elevated unconjugated bilirubinemia  Shama test is negative unknown etiology at this time kody pending, rf ordered  · On previous CT abdomen and pelvis there is no evidence of hypersplenism  · No evidence of bleed and the EGD although she never had a colonoscopy her previous occult were negative and she had no iron deficiency on the iron panel no recent medicines to cause it no evidence of any infection evident at this time  She has been transfused during this admission as well    · Transfuse if less than 7 5  · Discussing and managing the case with Hematology-Oncology from [facility]  · Patient had a bioprosthetic aortic valve replacement on June 29, 2021 at [facility] she had an 2D echo done on August 30, 2021 which showed a mild perivalvular leak which I have reached out in discussed with her cardiothoracic surgeon group at [facility] with leak being so mild they do not think this is attributing to acute hemolytic anemia- being transferred to [facility]

## 2021-09-09 NOTE — PROGRESS NOTES
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  · Large sliding hiatal hernia (type I hiatal hernia) without Alisia Poet lesions present - GE junction 25 cm from the incisors, diaphragmatic impression 35 cm from the incisors  · C/w IV PPI regimen -  · No abdominal pain  · PRN emesis control  · No obvious ulcers found of the intractable nausea vomiting as discussed with Gastroenterology started Remeron 7 5 mg at night patient has been tolerating clears without any report of nausea today still poor bowel movement will give her a dose of Dulcolax and then to surgical soft diet and see how she does looks like she has used Reglan 1 time today  I discussed with surgery they do not think this is cholecystitis this was found on the CT abdomen and pelvis as no cholecystitis 5 and on right upper quadrant and no clinical symptoms as well there was just sludge she will proceed with HIDA scan to ensure for completeness tomorrow will be NPO at midnight she will be transferred to [place] either [facility] or tomorrow for Hematology further evaluation and management    Gallbladder sludge  Assessment & Plan  · Found on right upper quadrant ultrasound on admission 1 evaluation for hyperbilirubinemia which is unconjugated and more likely related to hemolytic anemia    There is no evidence of cholecystitis she had no fever no abdominal pain today 1 further evaluating for any infectious process due to DIC which found to have a CT abdomen and pelvis done found to have acute cholecystitis had surgery evaluate the patient in discussed do not suspect clinically this is acute cholecystitis will proceed with HIDA scan for completeness for now hold off on any antibiotics  · hida negative     Acute hemolytic anemia ([address] )  Assessment & Plan  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 low haptoglobin elevated LDH elevated unconjugated bilirubinemia  Shama test is negative unknown etiology at this time kody pending, rf ordered  · On previous CT abdomen and pelvis there is no evidence of hypersplenism  · No evidence of bleed and the EGD although she never had a colonoscopy her previous occult were negative and she had no iron deficiency on the iron panel no recent medicines to cause it no evidence of any infection evident at this time  She has been transfused during this admission as well    · Transfuse if less than 7 5  · Discussing and managing the case with Hematology-Oncology from [facility]  · Patient had a bioprosthetic aortic valve replacement on June 29, 2021 at [facility] she had an 2D echo done on August 30, 2021 which showed a mild perivalvular leak which I have reached out in discussed with her cardiothoracic surgeon group at [facility] with leak being so mild they do not think this is attributing to acute hemolytic anemia- being transferred to [facility]    Pancytopenia  Assessment & Plan  · Although leukopenia is recurrent resolved and comes back no blasts on differential   A but the anemia has continued to progress since July and thrombocytopenia has been worsening since admission and also on the past admission although over 100,000 now is down to 66,000 see evaluation workup for above    Paroxysmal atrial fibrillation   Assessment & Plan  Rate controlled on Lopressor - c/w ASA - not on chronic anticoagulation - outpatient follow-up with cardiology- hold asa secondary to workup of DIC    Chronic diastolic CHF  Assessment & Plan  Continue beta-blockade with Lopressor - not chronically on diuretics  Echocardiogram @ [address] in June 2021 w/ an EF of 60-65%    Severe protein-calorie malnutrition  Assessment & Plan  BMI of 16 4 in the setting of decreased appetite/oral intake and evidence of muscle wasting/atrophy on exam  Initiated nutritional supplementation with meals    Thrombocytopenia ([address] )  Assessment & Plan  · Although has been progressive previously was in the 100,000 she presented with lower and continued to decline today to 66,000 I did notice some purpura/petechiae on her back and her chest   She reports no gum bleeding  There is no gross bleeding  She does have hemolytic anemia and also with her studies is compliant with DIC with fibrinogen being low schistocytes home will cells questionable if it is TTP unknown cause at this time even of DIC  No evidence of infection will workup further obtain a UA which I reviewed no evidence of UTI do a CT chest abdomen and pelvis she has been afebrile  · With TTP patients do have some associated GI symptoms of nausea and vomiting 45% she does not have any renal failure hematuria or neurological symptoms at this time  · I have discussed with [facility] hematology will transfuse 2 products of cryoprecipitate will repeat a fibrinogen 1 hour post that and then repeat it today in the afternoon will schedule for 1400 DIC panel along with CBC    Will hold aspirin well that is being worked up  · On the previous admission patient was not on any heparin products this admission she was initially on Lovenox products for 2 days  · Clay Elizalde stopped for now  · After discussion with Dr Jose Kong- hematology at [place] after the labs in the afternoon decision was made to transfer the patient to [facility] to have Hematology evaluate daily and do further diagnostics and management if needed as no Hematology available here  Discussed with patient and son okay with it  Patient has been accepted by jenny Noriega  C/w Sinemet/Xadago  PT/OT as tolerated    History of transcatheter aortic valve replacement (TAVR)  Assessment & Plan  History of TAVR @ [address] recently on 9655 with 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  Saw CT surgery on 21 w/ recommendation to stop Plavix due to patient complaints of GI upset  Serial outpatient followups- will hold aspirin for now secondary to workup of DIC          VTE Pharmacologic Prophylaxis: VTE Score: 5 Moderate Risk (Score 3-4) - Pharmacological DVT Prophylaxis Contraindicated  Sequential Compression Devices Ordered  Patient Centered Rounds: I performed bedside rounds with nursing staff today  Discussions with Specialists or Other Care Team Provider:  Hematology    Education and Discussions with Family / Patient: Updated  (son) via phone  Time Spent for Care: 30 minutes  More than 50% of total time spent on counseling and coordination of care as described above  Current Length of Stay: 6 day(s)  Current Patient Status: Inpatient   Certification Statement: The patient will continue to require additional inpatient hospital stay due to Secondary to hemolytic anemia and DIC  Discharge Plan: Anticipate discharge later today or tomorrow to discharge location to be determined pending rehab evaluations  Code Status: Level 3 - DNAR and DNI    Subjective:   Patient seen and examined has been eating and nausea since yesterday    She did not have a good bowel movement for few days till denies any abdominal pain chest pain or shortness of breath denies any bleeding    Objective:     Vitals:   Temp (24hrs), Av 4 °F (36 9 °C), Min:98 °F (36 7 °C), Max:98 7 °F (37 1 °C)    Temp:  [98 °F (36 7 °C)-98 7 °F (37 1 °C)] 98 7 °F (37 1 °C)  HR:  [75-82] 75  Resp:  [15-19] 19  BP: (112-134)/(57-81) 131/67  SpO2:  [96 %-98 %] 97 %  Body mass index is 16 31 kg/m²  Input and Output Summary (last 24 hours): Intake/Output Summary (Last 24 hours) at 9/9/2021 1339  Last data filed at 9/9/2021 0100  Gross per 24 hour   Intake --   Output 150 ml   Net -150 ml       Physical Exam:   Physical Exam  Vitals and nursing note reviewed  Constitutional:       General: She is not in acute distress  Appearance: She is well-developed  HENT:      Head: Normocephalic and atraumatic  Eyes:      Conjunctiva/sclera: Conjunctivae normal    Cardiovascular:      Rate and Rhythm: Normal rate and regular rhythm  Heart sounds: No murmur heard  Pulmonary:      Effort: Pulmonary effort is normal  No respiratory distress  Breath sounds: Normal breath sounds  Abdominal:      General: There is no distension  Palpations: Abdomen is soft  Tenderness: There is no abdominal tenderness  Musculoskeletal:      Cervical back: Neck supple  Skin:     General: Skin is warm and dry  Neurological:      General: No focal deficit present  Mental Status: She is alert  Mental status is at baseline  Psychiatric:         Mood and Affect: Mood normal           Additional Data:     Labs:  Results from last 7 days   Lab Units 09/09/21  0513 09/08/21  1412 09/06/21  0551   WBC Thousand/uL 3 24* 8 81   < >   HEMOGLOBIN g/dL 7 7* 8 0*   < >   HEMATOCRIT % 23 5* 25 0*   < >   PLATELETS Thousands/uL 64* 63*   < >   BANDS PCT % 1  --   --    NEUTROS PCT %  --  88*  --    LYMPHS PCT %  --  4*  --    LYMPHO PCT % 16  --   --    MONOS PCT %  --  6  --    MONO PCT % 2*  --   --    EOS PCT % 1 1  --     < > = values in this interval not displayed       Results from last 7 days   Lab Units 09/09/21  0513   SODIUM mmol/L 138   POTASSIUM mmol/L 3 3*   CHLORIDE mmol/L 101   CO2 mmol/L 30   BUN mg/dL 16   CREATININE mg/dL 0 63   ANION GAP mmol/L 7   CALCIUM mg/dL 8 0*   ALBUMIN g/dL 2 9*   TOTAL BILIRUBIN mg/dL 1 56*   ALK PHOS U/L 112   ALT U/L 10*   AST U/L 45   GLUCOSE RANDOM mg/dL 70     Results from last 7 days   Lab Units 09/09/21  0513   INR  1 10     Results from last 7 days   Lab Units 09/07/21  0753 09/07/21  0717   POC GLUCOSE mg/dl 131 61*         Results from last 7 days   Lab Units 09/03/21  0859   LACTIC ACID mmol/L 1 2       Lines/Drains:  Invasive Devices     Peripheral Intravenous Line            Peripheral IV 09/08/21 Distal;Dorsal (posterior); Right Forearm 1 day                      Imaging: Reviewed radiology reports from this admission including: abdominal/pelvic CT    Recent Cultures (last 7 days):         Last 24 Hours Medication List:   Current Facility-Administered Medications   Medication Dose Route Frequency Provider Last Rate    acetaminophen  650 mg Oral Q6H PRN Zia Eagles, DO      ALPRAZolam  0 25 mg Oral TID PRN Kaila Gomez PA-C      atorvastatin  80 mg Oral Daily Zia Pjs, DO      carbidopa-levodopa  1 tablet Oral 5x Daily Tejinder Hinton, DO      metoclopramide  10 mg Intravenous Q6H PRN Zia Garcia, DO      metoprolol tartrate  25 mg Oral Q12H [address] Tejinder Jamaal, DO      mirtazapine  7 5 mg Oral HS Mildred Dorado MD      pantoprazole  40 mg Oral Early Morning Mildred Dorado MD      pneumococcal 13-valent conjugate vaccine  0 5 mL Intramuscular Prior to discharge Vaughn Reynolds PA-C      polyethylene glycol  17 g Oral Daily Tejinder Jamaal, DO      Safinamide Mesylate  100 mg Oral Daily Tejinder Hinton, DO      saliva substitute  5 spray Mouth/Throat 4x Daily PRN JADA Conroy      senna-docusate sodium  1 tablet Oral HS Tejinder Hinton, DO      trimethobenzamide  200 mg Intramuscular Q8H PRN JADA Moore          Today, Patient Was Seen By: Mildred Dorado MD    **Please Note: This note may have been constructed using a voice recognition system  **

## 2021-09-09 NOTE — ASSESSMENT & PLAN NOTE
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  · Large sliding hiatal hernia (type I hiatal hernia) without [place] Lars lesions present - GE junction 25 cm from the incisors, diaphragmatic impression 35 cm from the incisors  · C/w IV PPI regimen -  · No abdominal pain  · PRN emesis control  · No obvious ulcers found of the intractable nausea vomiting as discussed with Gastroenterology started Remeron 7 5 mg at night patient has been tolerating clears without any report of nausea today still poor bowel movement will give her a dose of Dulcolax and then to surgical soft diet and see how she does looks like she has used Reglan 1 time today    I discussed with surgery they do not think this is cholecystitis this was found on the CT abdomen and pelvis as no cholecystitis 5 and on right upper quadrant and no clinical symptoms as well there was just sludge she will proceed with HIDA scan to ensure for completeness tomorrow will be NPO at midnight she will be transferred to [place] either [facility] or tomorrow for Hematology further evaluation and management

## 2021-09-09 NOTE — ASSESSMENT & PLAN NOTE
· Found on right upper quadrant ultrasound on admission 1 evaluation for hyperbilirubinemia which is unconjugated and more likely related to hemolytic anemia    There is no evidence of cholecystitis she had no fever no abdominal pain today 1 further evaluating for any infectious process due to DIC which found to have a CT abdomen and pelvis done found to have acute cholecystitis had surgery evaluate the patient in discussed do not suspect clinically this is acute cholecystitis will proceed with HIDA scan for completeness for now hold off on any antibiotics  · hida negative

## 2021-09-09 NOTE — ASSESSMENT & PLAN NOTE
· Although has been progressive previously was in the 100,000 she presented with lower and continued to decline today to 66,000 I did notice some purpura/petechiae on her back and her chest   She reports no gum bleeding  There is no gross bleeding  She does have hemolytic anemia and also with her studies is compliant with DIC with fibrinogen being low schistocytes home will cells questionable if it is TTP unknown cause at this time even of DIC  No evidence of infection will workup further obtain a UA which I reviewed no evidence of UTI do a CT chest abdomen and pelvis she has been afebrile  · With TTP patients do have some associated GI symptoms of nausea and vomiting 45% she does not have any renal failure hematuria or neurological symptoms at this time  · I have discussed with [facility] hematology will transfuse 2 products of cryoprecipitate will repeat a fibrinogen 1 hour post that and then repeat it today in the afternoon will schedule for 1400 DIC panel along with CBC  Will hold aspirin well that is being worked up  · On the previous admission patient was not on any heparin products this admission she was initially on Lovenox products for 2 days  · Eulene Chavez stopped for now  · After discussion with Dr Sara Inman- hematology at [place] after the labs in the afternoon decision was made to transfer the patient to [facility] to have Hematology evaluate daily and do further diagnostics and management if needed as no Hematology available here  Discussed with patient and son okay with it    Patient has been accepted by jenny Mayes

## 2021-09-09 NOTE — ASSESSMENT & PLAN NOTE
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

## 2021-09-09 NOTE — PROGRESS NOTES
Progress Note - General Surgery   Genevieve Watt 80 y o  female MRN: [id number]  Unit/Bed#: -01 Encounter: [id number]    Assessment:  Nausea/vomiting, improved  Gallbladder sludge, no biliary ductal dilatation and no pericholecystic fluid  Hiatal hernia  Multiple medical comorbidities    Plan:  Diet as tolerated  Medicate PRN pain/nausea  I/Os  GI rec's reviewed  Poor surgical candidate  HIDA scan this morning, if HIDA scan is positive then may require cholecystostomy tube by IR  Management of comorbidities per primary team    Subjective/Objective     Subjective: No acute events  Undergoing HIDA this morning  Pain improved  Nausea/vomiting improved    Objective:     Blood pressure 131/67, pulse 75, temperature 98 7 °F (37 1 °C), resp  rate 19, height 5' 5" (1 651 m), weight 44 5 kg (98 lb), SpO2 97 %  ,Body mass index is 16 31 kg/m²  Intake/Output Summary (Last 24 hours) at 9/9/2021 0844  Last data filed at 9/9/2021 0100  Gross per 24 hour   Intake 386 ml   Output 700 ml   Net -314 ml       Invasive Devices     Peripheral Intravenous Line            Peripheral IV 09/08/21 Distal;Dorsal (posterior); Right Forearm <1 day                Physical Exam:  Gen: AxOx3  Abd: soft, bowel sounds present, mild tenderness to deep palpation in RUQ, negative Krishnamurthy sign, no distention, no guarding or rebound      Lab, Imaging and other studies:  I have personally reviewed pertinent lab results      CBC:   Lab Results   Component Value Date    WBC 3 24 (L) 09/09/2021    HGB 7 7 (L) 09/09/2021    HCT 23 5 (L) 09/09/2021    MCV 94 09/09/2021    PLT 64 (L) 09/09/2021    MCH 30 8 09/09/2021    MCHC 32 8 09/09/2021    RDW 20 6 (H) 09/09/2021    NRBC 0 09/08/2021     CMP:   Lab Results   Component Value Date    SODIUM 138 09/09/2021    K 3 3 (L) 09/09/2021     09/09/2021    CO2 30 09/09/2021    BUN 16 09/09/2021    CREATININE 0 63 09/09/2021    CALCIUM 8 0 (L) 09/09/2021    AST 45 09/09/2021    ALT 10 (L) 09/09/2021 ALKPHOS 112 09/09/2021    EGFR 81 09/09/2021     VTE Pharmacologic Prophylaxis: Reason for no pharmacologic prophylaxis pancytopenia  VTE Mechanical Prophylaxis: sequential compression device       Cassandra Milan PA-C

## 2021-09-09 NOTE — CASE MANAGEMENT
Case Management Progress Note    Patient name Castillo Rosas  Location [address] 312/-78 MRN [id number]  : 1934 Date 2021       LOS (days): 6  Geometric Mean LOS (GMLOS) (days): 5 30  Days to GMLOS:-0 3        BUNDLE:      OBJECTIVE:  Pt is a 80y o  year old , white or  [1], female with Jewish preference of None admitted on 9/3/2021  8:43 AM  Pt is [facility] [address] [facility] complaints of Intractable nausea and vomiting     Current admission status: Inpatient  Preferred Pharmacy:   [facility] #[id number]- [address], [place] [address] [place]  [address] [place]  [place] [address]  Phone: [phone] Fax: [phone]    Primary Care Provider: Sherrie Gallagher    Primary Insurance: [facility]  Secondary Insurance:     PROGRESS NOTE:    Aware patient is waiting for at [facility]  CM has received choices for STR for patient_  They are as followed:    [address] [phone]  [facility] [address] [place] [address] [phone]   [facility] [phone]  [facility] [phone]  - referrals were Not made at this time      Covid vaccine record was emailed to me--  CM scanned into chart  She received her Horace Block on 21 and 3/25/2021

## 2021-09-10 LAB
ALBUMIN SERPL BCP-MCNC: 3.3 G/DL (ref 3.5–5)
ALP SERPL-CCNC: 157 U/L (ref 46–116)
ALT SERPL W P-5'-P-CCNC: 10 U/L (ref 12–78)
ANA HOMOGEN SER QL IF: NORMAL
ANA HOMOGEN TITR SER: NORMAL {TITER}
ANION GAP SERPL CALCULATED.3IONS-SCNC: 7 MMOL/L (ref 4–13)
AST SERPL W P-5'-P-CCNC: 58 U/L (ref 5–45)
BILIRUB SERPL-MCNC: 2.11 MG/DL (ref 0.2–1)
BUN SERPL-MCNC: 18 MG/DL (ref 5–25)
CALCIUM ALBUM COR SERPL-MCNC: 9.2 MG/DL (ref 8.3–10.1)
CALCIUM SERPL-MCNC: 8.6 MG/DL (ref 8.3–10.1)
CHLORIDE SERPL-SCNC: 101 MMOL/L (ref 100–108)
CO2 SERPL-SCNC: 28 MMOL/L (ref 21–32)
CREAT SERPL-MCNC: 0.57 MG/DL (ref 0.6–1.3)
ERYTHROCYTE [DISTWIDTH] IN BLOOD BY AUTOMATED COUNT: 20.1 % (ref 11.6–15.1)
GFR SERPL CREATININE-BSD FRML MDRD: 84 ML/MIN/1.73SQ M
GLUCOSE SERPL-MCNC: 51 MG/DL (ref 65–140)
HCT VFR BLD AUTO: 28.6 % (ref 34.8–46.1)
HEMOCCULT STL QL: NEGATIVE
HEMOCCULT STL QL: NORMAL
HEMOCCULT STL QL: NORMAL
HGB BLD-MCNC: 9 G/DL (ref 11.5–15.4)
MAGNESIUM SERPL-MCNC: 2 MG/DL (ref 1.6–2.6)
MCH RBC QN AUTO: 30.1 PG (ref 26.8–34.3)
MCHC RBC AUTO-ENTMCNC: 31.5 G/DL (ref 31.4–37.4)
MCV RBC AUTO: 96 FL (ref 82–98)
NRBC BLD AUTO-RTO: 0 /100 WBCS
PHOSPHATE SERPL-MCNC: 2.9 MG/DL (ref 2.3–4.1)
PLATELET # BLD AUTO: 80 THOUSANDS/UL (ref 149–390)
POTASSIUM SERPL-SCNC: 3.6 MMOL/L (ref 3.5–5.3)
PROT SERPL-MCNC: 6.2 G/DL (ref 6.4–8.2)
RBC # BLD AUTO: 2.99 MILLION/UL (ref 3.81–5.12)
RYE IGE QN: POSITIVE
SODIUM SERPL-SCNC: 136 MMOL/L (ref 136–145)
WBC # BLD AUTO: 2.94 THOUSAND/UL (ref 4.31–10.16)

## 2021-09-10 PROCEDURE — 80053 COMPREHEN METABOLIC PANEL: CPT | Performed by: INTERNAL MEDICINE

## 2021-09-10 PROCEDURE — 99447 NTRPROF PH1/NTRNET/EHR 11-20: CPT | Performed by: PHYSICIAN ASSISTANT

## 2021-09-10 PROCEDURE — 83735 ASSAY OF MAGNESIUM: CPT | Performed by: INTERNAL MEDICINE

## 2021-09-10 PROCEDURE — 99232 SBSQ HOSP IP/OBS MODERATE 35: CPT | Performed by: NURSE PRACTITIONER

## 2021-09-10 PROCEDURE — 99222 1ST HOSP IP/OBS MODERATE 55: CPT | Performed by: INTERNAL MEDICINE

## 2021-09-10 PROCEDURE — 85027 COMPLETE CBC AUTOMATED: CPT | Performed by: INTERNAL MEDICINE

## 2021-09-10 PROCEDURE — 97163 PT EVAL HIGH COMPLEX 45 MIN: CPT

## 2021-09-10 PROCEDURE — 84100 ASSAY OF PHOSPHORUS: CPT | Performed by: INTERNAL MEDICINE

## 2021-09-10 PROCEDURE — 97166 OT EVAL MOD COMPLEX 45 MIN: CPT

## 2021-09-10 RX ORDER — BISACODYL 10 MG
10 SUPPOSITORY, RECTAL RECTAL DAILY PRN
Status: DISCONTINUED | OUTPATIENT
Start: 2021-09-10 | End: 2021-09-12

## 2021-09-10 RX ADMIN — SAFINAMIDE MESYLATE 100 MG: 100 TABLET, FILM COATED ORAL at 13:12

## 2021-09-10 RX ADMIN — CARBIDOPA AND LEVODOPA 1 TABLET: 50; 200 TABLET, EXTENDED RELEASE ORAL at 08:20

## 2021-09-10 RX ADMIN — ALPRAZOLAM 0.25 MG: 0.25 TABLET ORAL at 22:27

## 2021-09-10 RX ADMIN — METOPROLOL TARTRATE 25 MG: 25 TABLET, FILM COATED ORAL at 08:19

## 2021-09-10 RX ADMIN — CARBIDOPA AND LEVODOPA 1 TABLET: 50; 200 TABLET, EXTENDED RELEASE ORAL at 22:29

## 2021-09-10 RX ADMIN — CARBIDOPA AND LEVODOPA 1 TABLET: 50; 200 TABLET, EXTENDED RELEASE ORAL at 14:29

## 2021-09-10 RX ADMIN — CARBIDOPA AND LEVODOPA 1 TABLET: 50; 200 TABLET, EXTENDED RELEASE ORAL at 19:16

## 2021-09-10 RX ADMIN — PANTOPRAZOLE SODIUM 40 MG: 20 TABLET, DELAYED RELEASE ORAL at 05:49

## 2021-09-10 RX ADMIN — MIRTAZAPINE 7.5 MG: 15 TABLET, FILM COATED ORAL at 22:27

## 2021-09-10 RX ADMIN — ATORVASTATIN CALCIUM 80 MG: 80 TABLET, FILM COATED ORAL at 08:20

## 2021-09-10 RX ADMIN — CARBIDOPA AND LEVODOPA 1 TABLET: 50; 200 TABLET, EXTENDED RELEASE ORAL at 05:49

## 2021-09-10 RX ADMIN — METOPROLOL TARTRATE 25 MG: 25 TABLET, FILM COATED ORAL at 22:28

## 2021-09-10 NOTE — ASSESSMENT & PLAN NOTE
Patient initially presented with intractable nausea and vomiting; ultrasound right upper quadrant demonstrated gallbladder sludge  CT abdomen showing distended gallbladder with mild surrounding inflammatory changes  HIDA scan and showing patent cystic duct  Prior to transfer, general surgery consulted and feel less likely acute cholecystitis  GI performed EGD showing large sliding hiatal hernia without Braden lesions and nonobstructing esophageal ring in the lower 3rd of the esophagus  Patient reports overall improvement symptoms; supportive care  Follow-up with GI in the outpatient setting

## 2021-09-10 NOTE — ASSESSMENT & PLAN NOTE
Patient currently rate controlled on admission; continue Lopressor  Back currently on [facility] in the outpatient setting, follow-up with cardiology

## 2021-09-10 NOTE — UTILIZATION REVIEW
Initial Clinical Review    Admission: Date/Time/Statement:   Admission Orders (From admission, onward)     Ordered        09/09/21 2155  Inpatient Admission  Once                   Orders Placed This Encounter   Procedures    Inpatient Admission     Standing Status:   Standing     Number of Occurrences:   1     Order Specific Question:   Level of Care     Answer:   Med Surg [16]     Order Specific Question:   Estimated length of stay     Answer:   More than 2 Midnights     Order Specific Question:   Certification     Answer:   I certify that inpatient services are medically necessary for this patient for a duration of greater than two midnights  See H&P and MD Progress Notes for additional information about the patient's course of treatment  Initial Presentation:   Mrs Hoa Joyce is an 79 yo female who presents as a transfer from [address] [place] [address] to [facility] for further evaluation of pancytopenia with Hematology consult and intractable N/V which started shortly after her TAVR and has lasted several months  Whenever she tastes food the becomes acutely ill  When not eating the symptoms are not present  She has had some improvement during admission at [facility]  AT [place] where she had EGD  She was noted to have hemolytic anemia and possible cholecystitis  PMH:  TAVR placement, Parkinson's, malnutrition, chronic anemia, pancytopenia, refractory nausea and vomiting, constipation, Chronic diastolic CHF  She still reports anorexia, N/V, chronic constipation  No other symptoms  She is admitted to INPATIENT status with  Pancytopenia/Hemolytic Anemia  - consult Hematology, transfuse for Hgb < 7 0  Intractable N/V - GI OP F/U  PAF - Lopressor, off Plavix but will continue in OP setting, OP F/U with cardio  Date: 9/10   Day 2:   Pt can resume ASA when ok with Hematology  Pt is reporting overall improvement in symptoms  Hematology consult pending  Hgb 9 0  Continue bowel regimen and lopressor    Workup:  Fibrinogen level has been trending up  Status post 2 units of cryoprecipitate 9/11  D-dimer also trending up  LDH significantly elevated  Total bilirubin has been elevated throughout hospitalization  Hepatitis-B and C panel negative  PTT/INR has been normal   Shama test normal   Reticulocyte count mildly elevated  Peripheral smear did show schistocytes and helmet cells  9/10 Hematology Consult -   1  Pancytopenia  Patient is leukopenic; WBC is 2 94 today; has been stable since 9/3 (3 52 -> 3 32 -> 2 45 -> 3 12 -> 4 38 -> 2 81 -> 3 24)  Patient is thrombocytopenia; platelets are 80 today, have downtrende since 9/3 (107 -> 92 -> 84 -> 84 -> 74 -> 64)  Fibrinogen level is 202 today s/p 2 units of cryoprecipitate on 9/8/21 after fibrinogen level was 149  D dimer was elevated yesterday (2 77)  LDH was elevated (1,615 9/6, 1,498 9/8)  Total bilirubin has been elevated throughout her stay at [address] [place] [address] (2 47 -> 2 14 -> 1 58 -> 2 37 -> 1 82 -> 1 56 -> 2 11) as well as direct bilirubin ( 39 ->  66 ->  48 9/8)  Hepatitis B and C panel was negative  PTT is normal (29 today); PT w/ INR is normal (14 1 today)  Direct Shama was negative 9/7  CT of the C/A/P done 9/8 showed no hypersplenism  This pancytopenia is likely a bone marrow production problem  DIC, ITP, TTP, and CARLOS are less likely to be the cause of her pancytopenia  It is a not new problem; CBC done as far back as 7/2 shows bicytopenia (Hb 10 3, Pl 139)  We are recommending a Bone Marrow Biopsy to look for a potential cause of the production problem; Ddx includes myelofibrosis, MDS, aplastic anemia, leukemia, lymphoma  We will continue to monitor her CBC throughout her hospital stay        2  Normocytic Normochromic Anemia  Patient has been anemic throughout her hospital stay  Her Hb is 9 today; has fluctuated from 10 (9/3) -> 8 1 -> 7 2 -> 9 3 -> 9 -> 8 2 -> 7 7  MCV is 96  Iron study done 9/5 was normal  Direct Shama was negative 9/7   Reticulocyte count was increased (4 85% 9/6)  Peripheral smear done 9/9/21 showed schistocytes and helmet cells  Patient received a bioprosthetic AV replacement 6/21/21 and a 2D echo done 8/30/21 showed a mild perivalvular leak  A bioprosthetic valve would likely not cause hemolytic anemia, but a mechanical valve could  Folate level was normal (6 6) and there was no evidence of B12 deficiency in her labs (B12 was 1,669)  There is no evidence of liver cirrhosis on CT  The anemia is likely due to a bone marrow production problem; we will review the results of the BM biopsy done Monday and go from there  We will continue to monitor CBC daily  Transfuse 1 unit of PRBC if Hb <7       3  Thrombocytopenia  Platelet count has been trending down throughout patient's hospital stay  Platelet count is 80 today (107 -> 92 -> 84 -> 84 -> 74 -> 64)  Thrombocytopenia is likely due to bone marrow production problem  We will continue to monitor CBC daily and BM biopsy results when available  Transfuse 1 unit of platelets if platelets fall below 10,000 or there is active bleeding       Outpatient FOLLOW UP plan: Pending BM biopsy  Wt Readings from Last 1 Encounters:   09/07/21 44 5 kg (98 lb)     Additional Vital Signs:   09/10/21 08:34:59  97 9 °F (36 6 °C)  69  20  116/68  84  98 %   09/09/21 2200  --  80  --  --  --  98 %   09/09/21 21:56:45  97 8 °F (36 6 °C)  --  20  126/63  84  --     Pertinent Labs/Diagnostic Test Results:     9/9 NM Hepatobiliary - 1  Gallbladder filling compatible with a patent cystic duct  The gallbladder is noted to be markedly enlarged as seen on CT     9/8 CT CAP - 1  Distended gallbladder with surrounding inflammatory changes suggesting acute cholecystitis  2 Bilateral adnexal cysts measuring 3 7 x 2 7 cm on the right and 1 8 x 1 8 cm on the left  Further evaluation with nonemergent ultrasound given the postmenopausal status recommended        Results from last 7 days   Lab Units 09/10/21  0556 09/09/21  4189 09/08/21  1412 09/08/21  0504 09/07/21  0513   WBC Thousand/uL 2 94* 3 24* 8 81 2 81* 4 38   HEMOGLOBIN g/dL 9 0* 7 7* 8 0* 8 2* 9 0*   HEMATOCRIT % 28 6* 23 5* 25 0* 24 9* 27 6*   PLATELETS Thousands/uL 80* 64* 63* 66* 74*   NEUTROS ABS Thousands/µL  --   --  7 81*  --   --    BANDS PCT %  --  1  --   --   --      Results from last 7 days   Lab Units 09/06/21  0551   RETIC CT ABS  144,000*   RETIC CT PCT % 4 85*     Results from last 7 days   Lab Units 09/10/21  0556 09/09/21  0513 09/08/21  0504 09/07/21  0513 09/05/21  0503   SODIUM mmol/L 136 138 137 139 138   POTASSIUM mmol/L 3 6 3 3* 3 4* 4 0 3 7   CHLORIDE mmol/L 101 101 101 102 104   CO2 mmol/L 28 30 28 28 28   ANION GAP mmol/L 7 7 8 9 6   BUN mg/dL 18 16 17 17 15   CREATININE mg/dL 0 57* 0 63 0 62 0 70 0 68   EGFR ml/min/1 73sq m 84 81 82 79 79   CALCIUM mg/dL 8 6 8 0* 8 1* 8 2* 7 9*   MAGNESIUM mg/dL 2 0  --   --   --  1 8   PHOSPHORUS mg/dL 2 9  --   --   --  3 1     Results from last 7 days   Lab Units 09/10/21  0556 09/09/21  0513 09/08/21  0504 09/07/21  0513 09/05/21  0503   AST U/L 58* 45 52* 54* 42   ALT U/L 10* 10* <6* 8* 7*   ALK PHOS U/L 157* 112 113 129* 76   TOTAL PROTEIN g/dL 6 2* 5 1* 4 9* 5 2* 4 7*   ALBUMIN g/dL 3 3* 2 9* 2 8* 3 0* 2 8*   TOTAL BILIRUBIN mg/dL 2 11* 1 56* 1 82* 2 37* 1 58*   BILIRUBIN DIRECT mg/dL  --   --  0 48* 0 66* 0 39*     Results from last 7 days   Lab Units 09/07/21  0753 09/07/21  0717   POC GLUCOSE mg/dl 131 61*     Results from last 7 days   Lab Units 09/10/21  0556 09/09/21  0513 09/08/21  0504 09/07/21  0513 09/05/21  0503 09/04/21  0600   GLUCOSE RANDOM mg/dL 51* 70 68 64* 71 68     Results from last 7 days   Lab Units 09/09/21  0513 09/08/21  1357 09/07/21  1216   D-DIMER QUANTITATIVE ug/ml FEU 2 77* 2 46* 1 12*     Results from last 7 days   Lab Units 09/09/21  0513 09/08/21  1357 09/08/21  0857   PROTIME seconds 14 1 13 2 14 2   INR  1 10 1 01 1 11   PTT seconds 29 28 29     Results from last 7 days   Lab Units 09/04/21  0600   TSH 3RD GENERATON uIU/mL 1 615     Results from last 7 days   Lab Units 09/07/21  0513 09/06/21  0551   NT-PRO BNP pg/mL 3,134* 4,272*     Results from last 7 days   Lab Units 09/05/21  0503   FERRITIN ng/mL 365     Results from last 7 days   Lab Units 09/06/21  0551   HEP B S AG  Non-reactive   HEP C AB  Non-reactive   HEP B C IGM  Non-reactive   HEP B C TOTAL AB  Non-reactive         Results from last 7 days   Lab Units 09/07/21  0513   CRP mg/L 1 1         Results from last 7 days   Lab Units 09/08/21  0950   CLARITY UA  Clear   COLOR UA  Dk Yellow   SPEC GRAV UA  1 020   PH UA  6 5   GLUCOSE UA mg/dl Negative   KETONES UA mg/dl 40 (2+)*   BLOOD UA  Trace-Intact*   PROTEIN UA mg/dl Trace*   NITRITE UA  Negative   BILIRUBIN UA  Small*   UROBILINOGEN UA E U /dl 4 0*   LEUKOCYTES UA  Trace*   WBC UA /hpf 2-4   RBC UA /hpf 1-2   BACTERIA UA /hpf Moderate*   EPITHELIAL CELLS WET PREP /hpf Occasional   MUCUS THREADS  Occasional*       Past Medical History:   Diagnosis Date    Diastolic congestive heart failure (HCC)     [facility]     History of transcatheter aortic valve replacement (TAVR)     [facility]     Parkinson's disease (HCC)     Paroxysmal atrial fibrillation (HCC)     UTI (urinary tract infection)      Present on Admission:   Parkinson's disease    Severe protein-calorie malnutrition   Chronic diastolic CHF   Paroxysmal atrial fibrillation    Pancytopenia   Intractable nausea and vomiting   Constipation    Admitting Diagnosis: Intractable nausea and vomiting [R11 2]     Age/Sex: 80 y o  female     Admission Orders:    Scheduled Medications:  atorvastatin, 80 mg, Oral, Daily  carbidopa-levodopa, 1 tablet, Oral, 5x Daily  metoprolol tartrate, 25 mg, Oral, Q12H SWATI  mirtazapine, 7 5 mg, Oral, HS  pantoprazole, 40 mg, Oral, Early Morning  polyethylene glycol, 17 g, Oral, Daily  Safinamide Mesylate, 100 mg, Oral, Daily  senna-docusate sodium, 1 tablet, Oral, HS      Continuous IV Infusions:     PRN Meds:  acetaminophen, 650 mg, Oral, Q6H PRN  ALPRAZolam, 0 25 mg, Oral, TID PRN - x 1 9/9  bisacodyl, 10 mg, Rectal, Daily PRN  metoclopramide, 10 mg, Intravenous, Q6H PRN -x 1 9/9  saliva substitute, 5 spray, Mouth/Throat, 4x Daily PRN  trimethobenzamide, 200 mg, Intramuscular, Q8H PRN    SCDs  OOB as christina   Nutritional supplements   Transfusion guidelines  IP CONSULT TO HEMATOLOGY  IP CONSULT TO NUTRITION SERVICES    Network Utilization Review Department  ATTENTION: Please call with any questions or concerns to [phone] and carefully listen to the prompts so that you are directed to the right person  All voicemails are confidential   Yung Yee all requests for admission clinical reviews, approved or denied determinations and any other requests to dedicated fax number below belonging to the campus where the patient is receiving treatment   List of dedicated fax numbers for the Facilities:  [address] DENIALS (Administrative/Medical Necessity) [phone]   [address] (Maternity/NICU/Pediatrics) [phone]   [address]  [address]in [place] [address] [address] [place] [address] [address] [facility] [facility] [phone]

## 2021-09-10 NOTE — OCCUPATIONAL THERAPY NOTE
Occupational Therapy Evaluation     Patient Name: Pinky Foy  BCAFG'P Date: 9/10/2021  Problem List  Principal Problem:    Pancytopenia  Active Problems:    History of transcatheter aortic valve replacement (TAVR)    Parkinson's disease     Severe protein-calorie malnutrition    Constipation    Chronic diastolic CHF    Paroxysmal atrial fibrillation     Intractable nausea and vomiting    Hemolytic anemia (HCC)    Past Medical History  Past Medical History:   Diagnosis Date    [facility]     [facility]) ([facility])     History of transcatheter aortic valve replacement (TAVR)     MI [facility]my[facility] infarction) [facility]     Parkinson's disease (HCC)     Paroxysmal atrial fibrillation (HCC)     UTI (urinary tract infection)      Past Surgical History  Past Surgical History:   Procedure Laterality Date    BREAST SURGERY      CARDIAC SURGERY      HYSTERECTOMY      REPLACEMENT AORTIC VALVE TRANSCATHETER (TAVR)      TUBAL LIGATION          09/10/21 0956   OT Last Visit   OT Visit Date 09/10/21   Note Type   Note type Evaluation   Restrictions/Precautions   Weight Bearing Precautions Per Order No   Other Precautions Chair Alarm; Bed Alarm; Fall Risk   Pain Assessment   Pain Assessment Tool 0-10   Pain Score No Pain   Home Living   Type of Home House   Home Layout One level  (2STE)   Bathroom Shower/Tub Tub/shower unit   Bathroom Toilet Raised   Bathroom Equipment Grab bars in shower; Shower chair;Grab bars around toilet   [address]   (2 rollators - did not use PTA )   Prior Function   Level of [place] Independent with ADLs and functional mobility   Lives With Alone   Receives Help From Family   ADL Assistance Independent   IADLs Needs assistance   Falls in the last 6 months 0   Vocational Retired   Comments Pt reports DIL assists in medication mgmt, has a cleaning lady   2 sons are her neighbors and can assist PRN, reports friends are always stopping by as well Lifestyle   Autonomy PTA, pt reports being I with ADLs, most IADLs - does have A with medication mgmt and cleaning    Reciprocal Relationships Family    Service to Others Retired   Intrinsic Gratification Walking around [address] (WDL) WDL   Subjective   Subjective "I'm feeling pretty good"   ADL   Where Assessed Edge of bed   Eating Assistance Natalie Quiroga 83 5  [address]  [address]  Supervision/Setup   Bed Mobility   Supine to Sit 5  Supervision   Additional items Assist x 1; Increased time required;HOB elevated   Sit to Supine Unable to assess   Transfers   Sit to Stand 4  Minimal assistance   Additional items Assist x 1; Increased time required   Stand to Sit 5  Supervision   Additional items Assist x 1; Increased time required   Additional Comments Transfers with RW    Functional Mobility   Functional Mobility 5  Supervision   Additional Comments Ax1   Additional items Rolling walker   Balance   Static Sitting Fair +   Dynamic Sitting Fair   Static Standing Fair   Dynamic Standing Fair -   Ambulatory Fair -   Activity Tolerance   Activity Tolerance Patient tolerated treatment well   Medical Staff Made Aware PT Pauline, SPT Mary   Nurse Made Aware yes   RUE Assessment   RUE Assessment WFL   LUE Assessment   LUE Assessment WFL   Hand Function   Gross Motor Coordination Functional   Fine Motor Coordination Functional   Sensation   Light Touch No apparent deficits   Vision-Basic Assessment   Current Vision Wears glasses all the time   Vision - Complex Assessment   Ocular Range of Motion WFL   Head Position WDL   Tracking Able to track stimulus in all quads without difficulty   Perception   Inattention/Neglect Appears intact   Cognition   Overall Cognitive Status [facility] Arousal/Participation Alert; Responsive; Cooperative   Attention Within functional limits   Orientation Level Oriented to person;Oriented to place;Oriented to situation  (states 2021 - intially says August but the sept )   Memory Within functional limits   Following Commands Follows one step commands without difficulty   Comments Pt pleasant and cooperative t/o session    Assessment   Assessment Pt is a 80 y o  female admitted to [facility] on 9/9/2021 w/ Pancytopenia ([facility] ), intractable NV, Hemolytic anemia  has a past medical history of Diastolic congestive heart failure ([facility] ), DVT (deep venous thrombosis) ([facility] ), History of transcatheter aortic valve replacement (TAVR), MI (myocardial infarction) ([facility] ), Parkinson's disease ([facility] ), [facility] and UTI (urinary tract infection)  Pt with active OT orders and up and OOB as tolerated orders  Pt seen as a co-evaluation with PT due to the patient's co-morbidities, clinically unstable presentation/clinical complexity, and present impairments  As per pt report, pta, resides in a 1STH, 2STE, alone  Pt was I w/  ADLS and her children assist with IADLS, (-) drove  Upon evaluation, pt currently requires MIN A for STS transfers and S with RW for mobility  Pt currently requires I eating and grooming, I UB ADLs, S LB ADLs, and S toileting  Pt reports 2 local sons who are her neighbors, sons and DIL assist with IADLs  From OT standpoint, recommendation would be home with continued social support and skilled therapy  No further acute OT needs  D/C OT  Please re-consult if needed  The patient's raw score on the AM-PAC Daily Activity inpatient short form is 21, standardized score is 44 27, greater than 39 4  Patients at this level are likely to benefit from discharge to home  Please refer to the recommendation of the Occupational Therapist for safe discharge planning     Plan   OT Frequency Eval only   Recommendation   OT Discharge Recommendation Home with home health rehabilitation  (and increased social support )   OT - OK to Discharge Yes  (when medically stable )   AM-PAC Daily Activity Inpatient   Lower Body Dressing 3   Bathing 3   Toileting 3   Upper Body Dressing 4   Grooming 4   Eating 4   Daily Activity Raw Score 21   Daily Activity Standardized Score (Calc for Raw Score >=11) 44 27   AM-PAC Applied Cognition Inpatient   Following a Speech/Presentation 4   Understanding Ordinary Conversation 4   Taking Medications 4   Remembering Where Things Are Placed or Put Away 4   Remembering List of 4-5 Errands 3   Taking Care of Complicated Tasks 3   Applied Cognition Raw Score 22   Applied Cognition Standardized Score 47 83        Mac Pagan MS, OTR/L

## 2021-09-10 NOTE — MALNUTRITION/BMI
This medical record reflects one or more clinical indicators suggestive of malnutrition and underweight  Malnutrition Findings:   Adult Malnutrition type: Acute illness (related to altered GI function; lack of appetite; at risk for skin breakdown)  Adult Degree of Malnutrition: Other severe protein calorie malnutrition (energy intake meeting < 50% needs for > 5 days; 8 4% wt loss x1 month; severe fat loss to buccal pads and orbitals, severe muscle loss to temporals, clavicals; treated with PO diet and nturtion supplements)  Malnutrition Characteristics: Fat loss, Muscle loss, Inadequate energy, Weight loss    BMI Findings:  Adult BMI Classifications: Underweight < 18 5     Body mass index is 16 31 kg/m²  See Nutrition note dated 9/10/21 for additional details  Completed nutrition assessment is viewable in the nutrition documentation        RD consult completed and documented in flow sheet

## 2021-09-10 NOTE — H&P
[facility]  H&P- Isidoro June 1934, 80 y o  female MRN: [id number]  [facility] Encounter: [id number]  Primary Care Provider: Yennifer Alberto   Date and time admitted to hospital: 9/9/2021  9:43 PM    * Pancytopenia  Assessment & Plan  Patient initially presented to [facility] for intractable nausea and vomiting  Subsequently found to have pancytopenia with fluctuating hemoglobin and declining platelets  Slightly elevated T bilirubin with elevated LDH; picture of hemolytic anemia  Currently hemoglobin stable at 7 7; transfuse for hemoglobin< 7 0  Additionally, platelets trending down; differential includes HI T versus malnutrition versus myelodysplastic disorder versus TMA (TTP) versus DIC versus infection  Evaluated by GI, EGD shows nonobstructing esophageal ring lower 3rd of the esophagus as well as hiatal hernia without Braden lesions; no further GI workup needed    Reticulocytes elevated 4 85%; less concerning for myelodysplastic anemia  Patient with bacteria on UA and white count less than 4 K; but no other SIRS criteria, continue to monitor off antibiotics at this time  With that in mind, hepatitis-C panel negative; consider HIV, but less likely infectious cause  Less likely TT P as fibrinogen is low and D-dimer is elevated  Four T-score-0; less likely HI T  Patient with stable B12 and folate labs; however BMI 16 31; concern for malnutrition  DIC concerning given low fibrinogen, elevated D-dimer, schistocytes, elevated PT/PTT and other findings; patient denies bleeding of gums; no petechia noted on exam   INR within normal limits; no apparent hepatic cause; rheumatoid factor negative; MANAN pending  Consult to Hematology      Hemolytic anemia ([facility] [address] )  Assessment & Plan  As per transferring team discharge summary:  · "Hemolysis workup is positive she has positive schistocytes positive low haptoglobin elevated LDH elevated unconjugated bilirubinemia  Shama test is negative unknown etiology at this time kody pending, rf ordered  · On previous CT abdomen and pelvis there is no evidence of hypersplenism  · No evidence of bleed and the EGD although she never had a colonoscopy her previous occult were negative and she had no iron deficiency on the iron panel no recent medicines to cause it no evidence of any infection evident at this time  She has been transfused during this admission as well    · Transfuse if less than 7 5  · Discussing and managing the case with Hematology-Oncology from [facility]  · Patient had a bioprosthetic aortic valve replacement on June 29, 2021 at [facility] she had an 2D echo done on August 30, 2021 which showed a mild perivalvular leak which I have reached out in discussed with her cardiothoracic surgeon group at [facility] with leak being so mild they do not think this is attributing to acute hemolytic anemia- being transferred to [facility]"    Consult to Hematology for further evaluation    Intractable nausea and vomiting  Assessment & Plan  Patient initially presented with intractable nausea and vomiting; ultrasound right upper quadrant demonstrated gallbladder sludge  CT abdomen showing distended gallbladder with mild surrounding inflammatory changes  HIDA scan and showing patent cystic duct  Prior to transfer, general surgery consulted and feel less likely acute cholecystitis  GI performed EGD showing large sliding hiatal hernia without Braden lesions and nonobstructing esophageal ring in the lower 3rd of the esophagus  Patient reports overall improvement symptoms; supportive care  Follow-up with GI in the outpatient setting    Paroxysmal atrial fibrillation   Assessment & Plan  Patient currently rate controlled on admission; continue Lopressor  Back currently on [facility] in the outpatient setting, follow-up with cardiology    Chronic diastolic CHF  Assessment & Plan  Wt Readings from Last 3 Encounters:   09/07/21 44 5 kg (98 lb)   08/11/21 48 4 kg (106 lb 12 8 oz)     Patient noted to have echo on June 2021 showing EF of 60 65%  Currently appears euvolemic; SpO2 97% on room air  Cardiac diet with sodium restriction  Ins and outs        Constipation  Assessment & Plan  Continue bowel regimen    Severe protein-calorie malnutrition  Assessment & Plan  Malnutrition Findings:           BMI Findings:    BMI 16 31      Parkinson's disease   Assessment & Plan  Continue home medications; supportive care  PT/OT, fall precautions    History of transcatheter aortic valve replacement (TAVR)  Assessment & Plan  Underwent TAVR placement on 07/2021  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  Currently off Plavix as per Cardiology for intolerance  Follow-up with cardiology in the outpatient setting      VTE Prophylaxis: Pharmacologic VTE Prophylaxis contraindicated due to Pancytopenia  / sequential compression device   Code Status:  DNR/DNI  POLST: POLST form is not discussed and not completed at this time  Discussion with family: Care plan discussed with patient who voiced understanding and agrees with recommendations  Anticipated Length of Stay:  Patient will be admitted on an Inpatient basis with an anticipated length of stay of  greater than 2 midnights  Justification for Hospital Stay:  Hemolytic anemia workup    Total Time for Visit, including Counseling / Coordination of Care: 60 minutes  Greater than 50% of this total time spent on direct patient counseling and coordination of care  Chief Complaint:   Nausea vomiting    History of Present Illness:    Joann Epps is a 80 y o  female with past medical history of TAVR placement, Parkinson's, malnutrition, chronic anemia, pancytopenia, refractory nausea and vomiting, constipation who was transferred for further evaluation of hemolytic anemia with pancytopenia    Patient initially presented to [facility] for workup and treatment of intractable nausea and vomiting  She states nausea and vomiting began shortly after receiving TAVR and has not improved substantially in the last few months  She reports whenever she tastes food she becomes acutely ill; but symptoms of nausea and vomiting are absent when she is not eating  Symptoms have improved somewhat during admission; GI has completed EGD and other workup  However, patient noted to have hemolytic anemia with possible cholecystitis  Transferred to [facility] for concerns of the ICU and received further workup from Hematology  Patient reports anorexia, nausea/vomiting, chronic constipation; but denies fever/chills, dysuria, shortness of breath, headaches, chest pain, dizziness, syncope  Review of Systems:    Review of Systems   Constitutional: Positive for activity change and appetite change  Negative for chills and fever  HENT: Negative for ear pain and sore throat  Eyes: Negative for pain and visual disturbance  Respiratory: Negative for cough and shortness of breath  Cardiovascular: Negative for chest pain and palpitations  Gastrointestinal: Positive for constipation, nausea and vomiting  Negative for abdominal pain and blood in stool  Genitourinary: Negative for dysuria and hematuria  Musculoskeletal: Negative for arthralgias and back pain  Skin: Negative for color change and rash  Neurological: Negative for seizures and syncope  Psychiatric/Behavioral: Negative  All other systems reviewed and are negative        Past Medical and Surgical History:     Past Medical History:   Diagnosis Date    Diastolic congestive heart failure ([facility] )     DVT (deep venous thrombosis) ([facility])     History of transcatheter aortic valve replacement (TAVR)     [facility]     Parkinson's disease (HCC)     Paroxysmal atrial fibrillation (HCC)     UTI (urinary tract infection)        Past Surgical History:   Procedure Laterality Date    BREAST SURGERY      CARDIAC SURGERY      HYSTERECTOMY      REPLACEMENT AORTIC VALVE TRANSCATHETER (TAVR)      TUBAL LIGATION         Meds/Allergies:    Prior to Admission medications    Medication Sig Start Date End Date Taking? Authorizing Provider   aspirin (ECOTRIN LOW STRENGTH) 81 mg EC tablet Take 81 mg by mouth daily     Historical Provider, MD   atorvastatin (LIPITOR) 80 mg tablet Take 80 mg by mouth daily     Historical Provider, MD   carbidopa-levodopa (SINEMET CR)  mg per tablet Take 1 tablet by mouth 5 (five) times a day     Historical Provider, MD   cholecalciferol (VITAMIN D3) 1,000 units tablet Take 1,000 Units by mouth daily     Historical Provider, MD   metoprolol tartrate (LOPRESSOR) 25 mg tablet Take 1 tablet (25 mg total) by mouth every 12 (twelve) hours 8/11/21 9/10/21  Adin Saldivar MD   pantoprazole (PROTONIX) 40 mg tablet Take 40 mg by mouth daily     Historical Provider, MD   polyethylene glycol (MIRALAX) 17 g packet Take 17 g by mouth daily     Historical Provider, MD   Safinamide Mesylate (Xadago) 100 MG TABS Take 100 mg by mouth daily     Historical Provider, MD   senna-docusate sodium (SENOKOT S) 8 6-50 mg per tablet Take 1 tablet by mouth daily at bedtime 8/11/21 9/10/21  Adin Saldivar MD   vitamin B-12 (VITAMIN B-12) 1,000 mcg tablet Take 1,000 mcg by mouth daily     Historical Provider, MD COHEN have reviewed home medications using allscripts  Allergies: Allergies   Allergen Reactions    Amoxicillin Diarrhea    Ciprofloxacin Vomiting    Erythromycin Diarrhea    Plavix [Clopidogrel] GI Intolerance       Social History:     Marital Status:     Occupation:  Retired  Patient Pre-hospital Living Situation:   Patient Pre-hospital Level of Mobility:  Restricted to Parkinson's  Patient Pre-hospital Diet Restrictions:  Cardiac  Substance Use History:   Social History     Substance and Sexual Activity   Alcohol Use Not Currently     Social History     Tobacco Use   Smoking Status Never Smoker Smokeless Tobacco Never Used     Social History     Substance and Sexual Activity   Drug Use Not Currently       Family History:    Family History   Problem Relation Age of Onset    Cancer Mother     Alzheimer's disease Mother     Heart disease Father        Physical Exam:     Vitals:   Blood Pressure: 126/63 (09/09/21 2156)  Pulse: 80 (09/09/21 2200)  Temperature: 97 8 °F (36 6 °C) (09/09/21 2156)  Respirations: 20 (09/09/21 2156)  SpO2: 98 % (09/09/21 2200)    Physical Exam  Vitals and nursing note reviewed  Constitutional:       General: She is not in acute distress  Appearance: She is well-developed  HENT:      Head: Normocephalic and atraumatic  Eyes:      Conjunctiva/sclera: Conjunctivae normal    Cardiovascular:      Rate and Rhythm: Normal rate and regular rhythm  Heart sounds: Murmur heard  Pulmonary:      Effort: Pulmonary effort is normal  No respiratory distress  Breath sounds: Normal breath sounds  Abdominal:      Palpations: Abdomen is soft  Tenderness: There is no abdominal tenderness  Musculoskeletal:      Cervical back: Neck supple  Comments: Kyphosis   Skin:     General: Skin is warm and dry  Neurological:      Mental Status: She is alert and oriented to person, place, and time  Psychiatric:         Mood and Affect: Mood normal          Behavior: Behavior normal          Additional Data:     Lab Results: I have personally reviewed pertinent reports  Results from last 7 days   Lab Units 09/09/21  0513 09/08/21  1412 09/06/21  0551   WBC Thousand/uL 3 24* 8 81   < >   HEMOGLOBIN g/dL 7 7* 8 0*   < >   HEMATOCRIT % 23 5* 25 0*   < >   PLATELETS Thousands/uL 64* 63*   < >   BANDS PCT % 1  --   --    NEUTROS PCT %  --  88*  --    LYMPHS PCT %  --  4*  --    LYMPHO PCT % 16  --   --    MONOS PCT %  --  6  --    MONO PCT % 2*  --   --    EOS PCT % 1 1  --     < > = values in this interval not displayed       Results from last 7 days   Lab Units 09/09/21  0513   SODIUM mmol/L 138   POTASSIUM mmol/L 3 3*   CHLORIDE mmol/L 101   CO2 mmol/L 30   BUN mg/dL 16   CREATININE mg/dL 0 63   ANION GAP mmol/L 7   CALCIUM mg/dL 8 0*   ALBUMIN g/dL 2 9*   TOTAL BILIRUBIN mg/dL 1 56*   ALK PHOS U/L 112   ALT U/L 10*   AST U/L 45   GLUCOSE RANDOM mg/dL 70     Results from last 7 days   Lab Units 09/09/21  0513   INR  1 10     Results from last 7 days   Lab Units 09/07/21  0753 09/07/21  0717   POC GLUCOSE mg/dl 131 61*         Results from last 7 days   Lab Units 09/03/21  0859   LACTIC ACID mmol/L 1 2       Imaging: I have personally reviewed pertinent reports  No orders to display       EKG, Pathology, and Other Studies Reviewed on Admission:    [facility] Records Reviewed: Yes     ** Please Note: This note has been constructed using a voice recognition system   ** [FreeTextEntry1] : 70yoF with metastatic cholangiocarcinoma presents for follow-up palliative care visit, referred by Oncology team.  PMH significant for HCV (treated with Harvoni), IBS, anxiety, GERD, thrombophlebitis. \par \par Patient initially who had some GI symptoms/elevated LFTs, weight loss (but was on weight watchers) in October 2019.  She saw Dr. Brunner (GI)  whom ordered a CT A/P which demonstrated enlargement of the head of the pancreas with an approximately 3.3 cm hypodense area posteriorly within the pancreatic head which is suspicious for neoplasm. Patient was referred for MRCP which revealed an irregularly enhancing 5.8 cm mass at the level of the confluence of the right and left hepatic biliary ducts. This findings were most consistent with hilar cholangiocarcinoma (Klatskin's tumor). There was no evidence of primary pancreatic neoplasm. She had biopsy of RUQ peritoneal nodule which confirmed metastatic cholangiocarcinoma. She was started on Gemzar/Cisplatin 12/20/19.  Did not tolerate Cisplatin and this was discontinued, now on single agent Gemzar.  \par \par Main issue for which patient initially presented was abdominal pain. The pain is localized to the RUQ and lower abdominal, she endorses bloating.  She was previously prescribed Morphine 15mg which she does not use due to concerns about dependence. She took it twice and it made her very constipated.  She is using PRN Tylenol 500mg, Motrin 200mg PRN (used rarely).\par \par Interval History: Patient presents for follow-up seen via TeleMedicine.  She reports continued fatigue and anxiety.  She continues to take escitalopram 5mg once daily and PRN alprazolam QID.  She has declined to increase escitalopram as recommended.  Patient has not needed to use medicinal cannabis in the past few weeks.   Denies pain but does report abdominal cramping; using PRN acetaminophen sparingly. She sees her therapist regularly and reports a therapeutic relationship. She sees her psychiatrist once per month.\par \par ROS:\par + weight has been stable \par + appetite is very good\par + chronic anxiety - on Lexapro 5mg QD and Alprazolam 0.25mg QID\par + nausea - uses Ondansetron ODT which usually helps\par + gas/bloating\par + occasional diarrhea- controlled with PRN Imodium - has erratic BMs depending on what she eats. \par All other ROS as outlined or noncontributory. \par \par Patient is , lives with her . Has supportive family.  She has three children (ages 54, 50, 45) from a previous marriage. It is important to patient that she does not suffer. \par \par Psychiatrist: Dr. NGOC Becerra\par \par I-Stop Ref#   [id number]

## 2021-09-10 NOTE — TELEMEDICINE
e-Consult (IPC)  - Interventional Radiology  Trae Stokes 80 y o  female MRN: [id number]  [facility] Encounter: [id number]    IR has been consulted to evaluate the patient, determine the appropriate procedure, and whether or not a procedure can and should be performed regarding the care of Trae Stokes  We were consulted by internal medicine concerning pancytopenia, and to possibly perform a bone marrow biopsy if medically appropriate for the patient  IP Consult to IR  Consult performed by: Orquidea Hernandez PA-C  Consult ordered by: Xi Howard        09/10/21      Assessment/Recommendation:      80year old female with pancytopenia of unknown cause    - will plan for bone marrow biopsy tentatively Monday  Please keep npo after midnight Sunday into Monday  - gen path form will need to be filled out prior to biopsy    Total time spent in review of data, discussion with requesting provider and rendering advice was 15 minutes       Patient or appropriate family member was verbally informed by internal medicine of this consultative service on their behalf to provide more timely access to specialty care in lieu of an in person consultation  Verbal consent was obtained  Thank you for allowing Interventional Radiology to participate in the care of Trae Stokes  Please don't hesitate to call or TigerText us with any questions       Orquidea Hernandez PA-C

## 2021-09-10 NOTE — CONSULTS
Medical Oncology/Hematology Consult Note  Oralee Lipoma, female, 80 y  o , 1934,  PP[facility] 913/[facility] 913-01, [phone]     Assessment and Plan  1  Pancytopenia  Patient is leukopenic; WBC is 2 94 today; has been stable since 9/3 (3 52 -> 3 32 -> 2 45 -> 3 12 -> 4 38 -> 2 81 -> 3 24)  Patient is thrombocytopenia; platelets are 80 today, have downtrende since 9/3 (107 -> 92 -> 84 -> 84 -> 74 -> 64)  Fibrinogen level is 202 today s/p 2 units of cryoprecipitate on 9/8/21 after fibrinogen level was 149  D dimer was elevated yesterday (2 77)  LDH was elevated (1,615 9/6, 1,498 9/8)  Total bilirubin has been elevated throughout her stay at [facility] (2 47 -> 2 14 -> 1 58 -> 2 37 -> 1 82 -> 1 56 -> 2 11) as well as direct bilirubin ( 39 ->  66 ->  48 9/8)  Hepatitis B and C panel was negative  PTT is normal (29 today); PT w/ INR is normal (14 1 today)  Direct Shama was negative 9/7  CT of the C/A/P done 9/8 showed no hypersplenism  This pancytopenia is likely a bone marrow production problem  DIC, ITP, TTP, and CARLOS are less likely to be the cause of her pancytopenia  It is a not new problem; CBC done as far back as 7/2 shows bicytopenia (Hb 10 3, Pl 139)  We are recommending a Bone Marrow Biopsy to look for a potential cause of the production problem; Ddx includes myelofibrosis, MDS, aplastic anemia, leukemia, lymphoma  We will continue to monitor her CBC throughout her hospital stay  2  Normocytic Normochromic Anemia  Patient has been anemic throughout her hospital stay  Her Hb is 9 today; has fluctuated from 10 (9/3) -> 8 1 -> 7 2 -> 9 3 -> 9 -> 8 2 -> 7 7  MCV is 96  Iron study done 9/5 was normal  Direct Shama was negative 9/7  Reticulocyte count was increased (4 85% 9/6)  Peripheral smear done 9/9/21 showed schistocytes and helmet cells  Patient received a bioprosthetic AV replacement 6/21/21 and a 2D echo done 8/30/21 showed a mild perivalvular leak   A bioprosthetic valve would likely not cause hemolytic anemia, but a mechanical valve could  Folate level was normal (6 6) and there was no evidence of B12 deficiency in her labs (B12 was 1,669)  There is no evidence of liver cirrhosis on CT  The anemia is likely due to a bone marrow production problem; we will review the results of the BM biopsy done Monday and go from there  We will continue to monitor CBC daily  Transfuse 1 unit of PRBC if Hb <7      3  Thrombocytopenia  Platelet count has been trending down throughout patient's hospital stay  Platelet count is 80 today (107 -> 92 -> 84 -> 84 -> 74 -> 64)  Thrombocytopenia is likely due to bone marrow production problem  We will continue to monitor CBC daily and BM biopsy results when available  Transfuse 1 unit of platelets if platelets fall below 10,000 or there is active bleeding  Outpatient FOLLOW UP plan: Pending BM biopsy  Communication with patient/family:  I did update the patient, as well as her son Marena Schaumann by phone  He didn't have any questions and is aware of the plan to do a BM biopsy  He is understandably concerned about his mother and wants to know what is causing her low blood counts  I also updated her daughter-in-law Destinee Renee was initially concerned about the procedure being painful for the patient but I told her it will be done with local anesthesia  Communication with treatment team: Did discuss the patient with Ruth Rosales NP Hospitalist; who discussed the BM biopsy with IR; they plan to do it Monday  I relayed this info to the patient  I did review this patient with Dr Carlo Pal and they are in agreement with this plan  Reason for consultation: Pancytopenia, hemolytic anemia, transfer from [address] [place] [address] for increased  Hematology/Oncology management       History of present illness:   Patient is a 80F with a PMH of TAVR replacement June 2021, Parkinson's, malnutrition, and chronic anemia who initially presented to the ED in [facility] for intractable nausea and vomiting 9/3/21  She was subsequently found to have pancytopenia with fluctuating hemoglobin and declining platelets  UA showed bacteria and WBC <4,000; she met no other SIRS criteria  Work ups were done for DIC, TTP, and CARLOS  2 units of cryoprecipitate were given 9/8  Patient was transferred to [facility] yesterday 9/9/21 for increased hematology care  Subjective: Patient states that she feels much better than she has in the past weeks/moths  Her appetite is still decreased  She did try to eat a few spoonfuls of macaroni and cheese, peaches, and milk at lunch  She doesn't feel nauseous but is having trouble getting her appetite back up  She admits to some retching when she eats or drinks but no vomit  She had a soft bowel movement in her bed during the night last night  She notes severely decreased energy,weakness, fatigue, and 50-60 pound unintentional weight loss since her TAVR in June  She denies fever/ chills/ dizziness/ bleeding  She has some bruises on her arm likely from IV placement  Review of Systems:    Review of Systems   Constitutional: Positive for appetite change, fatigue and unexpected weight change  Negative for chills, diaphoresis and fever  HENT: Positive for trouble swallowing  Negative for nosebleeds, rhinorrhea, sinus pressure, sinus pain and sneezing  Respiratory: Negative for cough, choking, chest tightness and shortness of breath  Cardiovascular: Negative for chest pain  Gastrointestinal: Negative for blood in stool, constipation, diarrhea, nausea and vomiting  Genitourinary: Negative for difficulty urinating, dysuria, hematuria and pelvic pain  Musculoskeletal: Negative for myalgias and neck pain  Neurological: Positive for weakness  Negative for dizziness, light-headedness, numbness and headaches  Hematological: Bruises/bleeds easily  Psychiatric/Behavioral: Negative for confusion         Past Medical History:   Diagnosis Date    Diastolic congestive heart failure ([place] [address] )     DVT [facility] venous thrombosis) [facility])     History of transcatheter aortic valve replacement (TAVR)     MI (myocardial infarction) ([place] [address] )     Parkinson's disease ([place] [address] )     Paroxysmal atrial fibrillation ([facility])     UTI (urinary tract infection)        Past Surgical History:   Procedure Laterality Date    BREAST SURGERY      CARDIAC SURGERY      HYSTERECTOMY      REPLACEMENT AORTIC VALVE TRANSCATHETER (TAVR)      TUBAL LIGATION         Family History   Problem Relation Age of Onset    Cancer Mother     Alzheimer's disease Mother     Heart disease Father        Social History     Socioeconomic History    Marital status:      Spouse name: Not on file    Number of children: Not on file    Years of education: Not on file    Highest education level: Not on file   Occupational History    Not on file   Tobacco Use    Smoking status: Never Smoker    Smokeless tobacco: Never Used   Vaping Use    Vaping Use: Never used   Substance and Sexual Activity    Alcohol use: Not Currently    Drug use: Not Currently    Sexual activity: Not Currently   Other Topics Concern    Not on file   Social History Narrative    Not on file     Social Determinants of Health     Financial Resource Strain:     Difficulty of Paying Living Expenses:    Food Insecurity:     Worried About Running Out of Food in the Last Year:     [address] in the Last Year:    Transportation Needs: No Transportation Needs    Lack of Transportation (Medical): No    Lack of Transportation (Non-Medical):  No   Physical Activity:     Days of Exercise per Week:     Minutes of Exercise per Session:    Stress:     Feeling of Stress :    Social Connections:     Frequency of Communication with Friends and Family:     Frequency of Social Gatherings with Friends and Family:     Attends Mandaeism Services:     Active Member of Clubs or Organizations:     Attends Club or Organization Meetings:     Marital Status: Intimate Partner Violence:     Fear of Current or Ex-Partner:     Emotionally Abused:     Physically Abused:     Sexually Abused:          Current Facility-Administered Medications:     acetaminophen (TYLENOL) tablet 650 mg, 650 mg, Oral, Q6H PRN, Minal Ochoa MD    ALPRAZolam Yassine Distance) tablet 0 25 mg, 0 25 mg, Oral, TID PRN, Minal Ochoa MD, 0 25 mg at 09/09/21 2204    atorvastatin (LIPITOR) tablet 80 mg, 80 mg, Oral, Daily, Minal Ochoa MD, 80 mg at 09/10/21 0820    carbidopa-levodopa (SINEMET CR)  mg per ER tablet 1 tablet, 1 tablet, Oral, 5x Daily, Minal Ochoa MD, 1 tablet at 09/10/21 0820    metoclopramide (REGLAN) injection 10 mg, 10 mg, Intravenous, Q6H PRN, Minal Ochoa MD, 10 mg at 09/09/21 2204    metoprolol tartrate (LOPRESSOR) tablet 25 mg, 25 mg, Oral, Q12H [address], Minal Ochoa MD, 25 mg at 09/10/21 0819    mirtazapine (REMERON) tablet 7 5 mg, 7 5 mg, Oral, HS, Minal Ochoa MD, 7 5 mg at 09/09/21 2203    pantoprazole (PROTONIX) EC tablet 40 mg, 40 mg, Oral, Early Morning, Minal Ochoa MD, 40 mg at 09/10/21 0549    polyethylene glycol (MIRALAX) packet 17 g, 17 g, Oral, Daily, Minal Ocoha MD  Grant Abt  Safinamide Mesylate TABS 100 mg, 100 mg, Oral, Daily, Minal Ochoa MD    saliva substitute (MOUTH KOTE) mucosal solution 5 spray, 5 spray, Mouth/Throat, 4x Daily PRN, Minal Ochoa MD    senna-docusate sodium (SENOKOT S) 8 6-50 mg per tablet 1 tablet, 1 tablet, Oral, HS, Minal Ochoa MD    trimethobenzamide Norina Silence) IM injection 200 mg, 200 mg, Intramuscular, Q8H PRN, Minal Ochoa MD    Medications Prior to Admission   Medication    aspirin (ECOTRIN LOW STRENGTH) 81 mg EC tablet    atorvastatin (LIPITOR) 80 mg tablet    carbidopa-levodopa (SINEMET CR)  mg per tablet    cholecalciferol (VITAMIN D3) 1,000 units tablet    metoprolol tartrate (LOPRESSOR) 25 mg tablet    pantoprazole (PROTONIX) 40 mg tablet    polyethylene glycol (MIRALAX) 17 g packet    Safinamide Mesylate Savana Moat) 100 MG TABS    senna-docusate sodium (SENOKOT S) 8 6-50 mg per tablet    vitamin B-12 (VITAMIN B-12) 1,000 mcg tablet       Allergies   Allergen Reactions    Amoxicillin Diarrhea    Ciprofloxacin Vomiting    Erythromycin Diarrhea    Plavix [Clopidogrel] GI Intolerance         Physical Exam:     /68   Pulse 69   Temp 97 9 °F (36 6 °C)   Resp 20   SpO2 98%     Physical Exam  Constitutional:       General: She is not in acute distress  Appearance: She is ill-appearing  Comments: Patient is very thin in appearance  Cardiovascular:      Rate and Rhythm: Normal rate and regular rhythm  Pulmonary:      Effort: Pulmonary effort is normal       Breath sounds: Normal breath sounds  No wheezing or rales  Abdominal:      General: Bowel sounds are normal  There is no distension  Palpations: Abdomen is soft  Tenderness: There is no abdominal tenderness  Skin:     General: Skin is warm and dry  Neurological:      General: No focal deficit present  Mental Status: She is alert and oriented to person, place, and time     Psychiatric:         Mood and Affect: Mood normal            Recent Results (from the past 48 hour(s))   UA w Reflex to Microscopic w Reflex to Culture    Collection Time: 09/08/21  9:50 AM    Specimen: Urine, Other   Result Value Ref Range    Color, UA Dk Yellow     Clarity, UA Clear     Specific Gravity, UA 1 020 1 003 - 1 030    pH, UA 6 5 4 5, 5 0, 5 5, 6 0, 6 5, 7 0, 7 5, 8 0    Leukocytes, UA Trace (A) Negative    Nitrite, UA Negative Negative    Protein, UA Trace (A) Negative mg/dl    Glucose, UA Negative Negative mg/dl    Ketones, UA 40 (2+) (A) Negative mg/dl    Urobilinogen, UA 4 0 (A) 0 2, 1 0 E U /dl E U /dl    Bilirubin, UA Small (A) Negative    Blood, UA Trace-Intact (A) Negative   Urine Microscopic    Collection Time: 09/08/21  9:50 AM   Result Value Ref Range    RBC, UA 1-2 None Seen, 0-1, 1-2, 2-4, 0-5 /hpf    WBC, UA 2-4 None Seen, 0-1, 1-2, 0-5, 2-4 /hpf    Epithelial Cells Occasional None Seen, Occasional /hpf    Bacteria, UA Moderate (A) None Seen, Occasional /hpf    MUCUS THREADS Occasional (A) None Seen   Protime-INR    Collection Time: 09/08/21  1:57 PM   Result Value Ref Range    Protime 13 2 11 6 - 14 5 seconds    INR 1 01 0 84 - 1 19   APTT    Collection Time: 09/08/21  1:57 PM   Result Value Ref Range    PTT 28 23 - 37 seconds   Fibrinogen    Collection Time: 09/08/21  1:57 PM   Result Value Ref Range    Fibrinogen 219 (L) 227 - 495 mg/dL   D-dimer, quantitative    Collection Time: 09/08/21  1:57 PM   Result Value Ref Range    D-Dimer, Quant 2 46 (H) <0 50 ug/ml FEU   RF Screen w/ Reflex to Titer    Collection Time: 09/08/21  1:57 PM   Result Value Ref Range    Rheumatoid Factor Negative Negative   CBC and differential    Collection Time: 09/08/21  2:12 PM   Result Value Ref Range    WBC 8 81 4 31 - 10 16 Thousand/uL    RBC 2 64 (L) 3 81 - 5 12 Million/uL    Hemoglobin 8 0 (L) 11 5 - 15 4 g/dL    Hematocrit 25 0 (L) 34 8 - 46 1 %    MCV 95 82 - 98 fL    MCH 30 3 26 8 - 34 3 pg    MCHC 32 0 31 4 - 37 4 g/dL    RDW 20 4 (H) 11 6 - 15 1 %    Platelets 63 (L) 934 - 390 Thousands/uL    nRBC 0 /100 WBCs    Neutrophils Relative 88 (H) 43 - 75 %    Immat GRANS % 1 0 - 2 %    Lymphocytes Relative 4 (L) 14 - 44 %    Monocytes Relative 6 4 - 12 %    Eosinophils Relative 1 0 - 6 %    Basophils Relative 0 0 - 1 %    Neutrophils Absolute 7 81 (H) 1 85 - 7 62 Thousands/µL    Immature Grans Absolute 0 04 0 00 - 0 20 Thousand/uL    Lymphocytes Absolute 0 36 (L) 0 60 - 4 47 Thousands/µL    Monocytes Absolute 0 54 0 17 - 1 22 Thousand/µL    Eosinophils Absolute 0 04 0 00 - 0 61 Thousand/µL    Basophils Absolute 0 02 0 00 - 0 10 Thousands/µL   Occult blood 1-3, stool    Collection Time: 09/09/21 12:30 AM   Result Value Ref Range    Fecal Occult Blood Diagnostic Negative Negative    Fecal Occult Blood Diagnostic 2 Test not performed Negative    Fecal Occult Blood Diagnostic 3 Test not performed Negative   CBC and differential    Collection Time: 09/09/21  5:13 AM   Result Value Ref Range    WBC 3 24 (L) 4 31 - 10 16 Thousand/uL    RBC 2 50 (L) 3 81 - 5 12 Million/uL    Hemoglobin 7 7 (L) 11 5 - 15 4 g/dL    Hematocrit 23 5 (L) 34 8 - 46 1 %    MCV 94 82 - 98 fL    MCH 30 8 26 8 - 34 3 pg    MCHC 32 8 31 4 - 37 4 g/dL    RDW 20 6 (H) 11 6 - 15 1 %    Platelets 64 (L) 001 - 390 Thousands/uL   Comprehensive metabolic panel    Collection Time: 09/09/21  5:13 AM   Result Value Ref Range    Sodium 138 136 - 145 mmol/L    Potassium 3 3 (L) 3 5 - 5 3 mmol/L    Chloride 101 100 - 108 mmol/L    CO2 30 21 - 32 mmol/L    ANION GAP 7 4 - 13 mmol/L    BUN 16 5 - 25 mg/dL    Creatinine 0 63 0 60 - 1 30 mg/dL    Glucose 70 65 - 140 mg/dL    Calcium 8 0 (L) 8 3 - 10 1 mg/dL    Corrected Calcium 8 9 8 3 - 10 1 mg/dL    AST 45 5 - 45 U/L    ALT 10 (L) 12 - 78 U/L    Alkaline Phosphatase 112 46 - 116 U/L    Total Protein 5 1 (L) 6 4 - 8 2 g/dL    Albumin 2 9 (L) 3 5 - 5 0 g/dL    Total Bilirubin 1 56 (H) 0 20 - 1 00 mg/dL    eGFR 81 ml/min/1 73sq m   D-dimer, quantitative    Collection Time: 09/09/21  5:13 AM   Result Value Ref Range    D-Dimer, Quant 2 77 (H) <0 50 ug/ml FEU   APTT    Collection Time: 09/09/21  5:13 AM   Result Value Ref Range    PTT 29 23 - 37 seconds   Protime-INR    Collection Time: 09/09/21  5:13 AM   Result Value Ref Range    Protime 14 1 11 6 - 14 5 seconds    INR 1 10 0 84 - 1 19   Lactate dehydrogenase    Collection Time: 09/09/21  5:13 AM   Result Value Ref Range    LD 1,580 (H) 81 - 234 U/L   Fibrinogen    Collection Time: 09/09/21  5:13 AM   Result Value Ref Range    Fibrinogen 202 (L) 227 - 495 mg/dL   Peripheral Smear    Collection Time: 09/09/21  5:13 AM   Result Value Ref Range    RBC Morphology Present     Ovalocytes Present     Polychromasia Present     Schistocytes Present     Acanthocytes Present     Anisocytosis Present     Helmet Cells Present     Platelet Estimate Decreased (A) Adequate   Hemolysis Smear    Collection Time: 09/09/21  5:13 AM   Result Value Ref Range    Hemolysis Smear Schistocytes noted  Helmet Cells noted    Manual Differential(PHLEBS Do Not Order)    Collection Time: 09/09/21  5:13 AM   Result Value Ref Range    Segmented % 79 (H) 43 - 75 %    Bands % 1 0 - 8 %    Lymphocytes % 16 14 - 44 %    Monocytes % 2 (L) 4 - 12 %    Eosinophils, % 1 0 - 6 %    Basophils % 1 0 - 1 %    Absolute Neutrophils 2 59 1 85 - 7 62 Thousand/uL    Lymphocytes Absolute 0 52 (L) 0 60 - 4 47 Thousand/uL    Monocytes Absolute 0 06 0 00 - 1 22 Thousand/uL    Eosinophils Absolute 0 03 0 00 - 0 40 Thousand/uL    Basophils Absolute 0 03 0 00 - 0 10 Thousand/uL    Total Counted      RBC Morphology Present     Acanthocytes Present     Anisocytosis Present     Helmet Cells Present     Ovalocytes Present     Polychromasia Present     Schistocytes Present     Platelet Estimate Decreased (A) Adequate   Prepare cryoprecipitate (1 product = 5 pooled units): 2 Product    Collection Time: 09/09/21  6:37 AM   Result Value Ref Range    Unit Product Code U6168M74     Unit Number [id number]     Unit ABO AB     Unit [facility] POS     Unit Dispense Status Presumed Trans     Unit Product Volume 102 mL    Unit Product Code M2255H42     Unit Number [id number]     Unit ABO AB     Unit [facility] POS     Unit Dispense Status Presumed Trans     Unit Product Volume 104 mL   CBC and differential    Collection Time: 09/10/21  5:56 AM   Result Value Ref Range    WBC 2 94 (L) 4 31 - 10 16 Thousand/uL    RBC 2 99 (L) 3 81 - 5 12 Million/uL    Hemoglobin 9 0 (L) 11 5 - 15 4 g/dL    Hematocrit 28 6 (L) 34 8 - 46 1 %    MCV 96 82 - 98 fL    MCH 30 1 26 8 - 34 3 pg    MCHC 31 5 31 4 - 37 4 g/dL    RDW 20 1 (H) 11 6 - 15 1 %    Platelets 80 (L) 944 - 390 Thousands/uL    nRBC 0 /100 WBCs   Comprehensive metabolic panel    Collection Time: 09/10/21  5:56 AM   Result Value Ref Range    Sodium 136 136 - 145 mmol/L Potassium 3 6 3 5 - 5 3 mmol/L    Chloride 101 100 - 108 mmol/L    CO2 28 21 - 32 mmol/L    ANION GAP 7 4 - 13 mmol/L    BUN 18 5 - 25 mg/dL    Creatinine 0 57 (L) 0 60 - 1 30 mg/dL    Glucose 51 (L) 65 - 140 mg/dL    Calcium 8 6 8 3 - 10 1 mg/dL    Corrected Calcium 9 2 8 3 - 10 1 mg/dL    AST 58 (H) 5 - 45 U/L    ALT 10 (L) 12 - 78 U/L    Alkaline Phosphatase 157 (H) 46 - 116 U/L    Total Protein 6 2 (L) 6 4 - 8 2 g/dL    Albumin 3 3 (L) 3 5 - 5 0 g/dL    Total Bilirubin 2 11 (H) 0 20 - 1 00 mg/dL    eGFR 84 ml/min/1 73sq m   Magnesium    Collection Time: 09/10/21  5:56 AM   Result Value Ref Range    Magnesium 2 0 1 6 - 2 6 mg/dL   Phosphorus    Collection Time: 09/10/21  5:56 AM   Result Value Ref Range    Phosphorus 2 9 2 3 - 4 1 mg/dL       EGD    Result Date: 9/7/2021  Narrative: [facility]  [address] [facility] [address] [phone] DATE OF SERVICE: 9/07/21 PHYSICIAN(S): Nilson Ren MD - Attending Physician INDICATION: Hiatal hernia, Severe protein-calorie malnutrition ([address] ), Intractable nausea and vomiting POST-OP DIAGNOSIS: See the impression below  PREPROCEDURE: Informed consent was obtained for the procedure, including sedation  Risks of perforation, hemorrhage, adverse drug reaction and aspiration were discussed  The patient was placed in the left lateral decubitus position  Patient was explained about the risks and benefits of the procedure  Risks including but not limited to bleeding, infection, and perforation were explained in detail  Also explained about less than 100% sensitivity with the exam and other alternatives  DETAILS OF PROCEDURE: Patient was taken to the procedure room where a time out was performed to confirm correct patient and correct procedure   The patient underwent monitored anesthesia care, which was administered by an anesthesia professional  The patient's blood pressure, heart rate, level of consciousness, respirations, oxygen, ECG and ETCO2 were monitored throughout the procedure  The scope was advanced to the second part of the duodenum  Retroflexion was performed in the incisura  The patient experienced no blood loss  The procedure was not difficult  The patient tolerated the procedure well  There were no apparent complications  ANESTHESIA INFORMATION: ASA: III Anesthesia Type: Anesthesia type not filed in the log   MEDICATIONS: No administrations occurring from 1432 to 1453 on 09/07/21 FINDINGS: 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 Large sliding hiatal hernia (type I hiatal hernia) without [employer] lesions present - GE junction 25 cm from the incisors, diaphragmatic impression 35 cm from the incisors Performed biopsies to rule out H  pylori in the body of the stomach, incisura and antrum The duodenal bulb and 2nd part of the duodenum appeared normal  Performed biopsies to rule out celiac disease in the duodenal bulb and 2nd part of the duodenum SPECIMENS: ID Type Source Tests Collected by Time Destination 1 : duodenum biopsies, retrieved by cold biopsy forcep, rule out celiac Tissue Duodenum TISSUE EXAM Eleuterio Hernandez MD 9/7/2021  2:44 PM  2 : stomach biopsies, retrieved by cold biopsy forcep, r/o H Pylori Tissue Stomach TISSUE EXAM Eleuterio Hernandez MD 9/7/2021  2:48 PM      Impression: 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 Large hiatal hernia without Braden's lesions Random gastric biopsies taken to rule out H pylori Normal duodenal bulb and 2nd portion of duodenum Random duodenal biopsies taken to rule out celiac disease RECOMMENDATION: Await pathology results Return to floor for ongoing care Reduce PPI to once daily Advance diet as tolerated Continue with antiemetics Nutrition evaluation if this has not been performed already Consider repeat echocardiogram to see if heart failure could be contributing to her nausea Consider low-dose mirtazapine to help with nausea and appetite stimulation  Eleuterio Hernandez MD CT chest abdomen pelvis wo contrast    Result Date: 9/8/2021  Narrative: CT CHEST, ABDOMEN AND PELVIS WITHOUT IV CONTRAST INDICATION:   dic r/o infection  COMPARISON:  Compared with 8/20/2021 TECHNIQUE: CT examination of the chest, abdomen and pelvis was performed without intravenous contrast   Axial, sagittal, and coronal 2D reformatted images were created from the source data and submitted for interpretation  Radiation dose length product (DLP) for this visit:  797 mGy-cm   This examination, like all CT scans performed in the [facility], was performed utilizing techniques to minimize radiation dose exposure, including the use of iterative reconstruction and automated exposure control  Enteric contrast was administered  FINDINGS: CHEST LUNGS:  Lungs are clear  There is no tracheal or endobronchial lesion  PLEURA:  Trace pleural effusions  HEART/GREAT VESSELS:  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  Prosthetic aortic valve  MEDIASTINUM AND EVELINA:  Moderate hiatal hernia  CHEST WALL AND LOWER NECK:   Unremarkable  ABDOMEN LIVER/BILIARY TREE:  One or more subcentimeter sharply circumscribed low-density hepatic lesion(s) are noted, too small to accurately characterize, but statistically most likely to represent subcentimeter hepatic cysts  No suspicious solid hepatic lesion is identified  Hepatic contours are normal   No biliary dilatation  GALLBLADDER:  Distended gallbladder  Surrounding inflammatory changes  SPLEEN:  Unremarkable  PANCREAS:  Unremarkable  ADRENAL GLANDS:  Unremarkable  KIDNEYS/URETERS:  Right renal exophytic cyst measuring 2 5 x 3 7 cm    No hydronephrosis  STOMACH AND BOWEL:  Unremarkable  APPENDIX:  No findings to suggest appendicitis  ABDOMINOPELVIC CAVITY:  No ascites  No pneumoperitoneum  No lymphadenopathy  VESSELS:  Unremarkable for patient's age  PELVIS REPRODUCTIVE ORGANS:  Surgical changes of prior hysterectomy    Right adnexal region cystic focus measuring 3 7 x 2 7 cm and on the left adnexal region measuring 1 8 x 1 8 cm  URINARY BLADDER:  Partially distended  ABDOMINAL WALL/INGUINAL REGIONS:  Unremarkable  OSSEOUS STRUCTURES: Thoracic kyphosis  Wedge deformity of L5 of indeterminate age  No acute fracture or destructive osseous lesion  Impression: 1  Distended gallbladder with surrounding inflammatory changes suggesting acute cholecystitis  2 Bilateral adnexal cysts measuring 3 7 x 2 7 cm on the right and 1 8 x 1 8 cm on the left  Further evaluation with nonemergent ultrasound given the postmenopausal status recommended  Workstation performed: [id number]     NM hepatobiliary    Result Date: 9/9/2021  Narrative: HEPATOBILIARY SCAN INDICATION: Nausea/vomiting see above COMPARISON:  Ultrasound abdomen 9/3/2021, CT chest abdomen pelvis 9/8/2021 TECHNIQUE:   Following the intravenous administration of 5 05 mCi Tc-99m labeled mebrofenin, dynamic abdominal imaging was obtained in the anterior projection over a 60 minute time period  80 minute delayed static image acquired  FINDINGS:  There is prompt, uniform accumulation with normal clearance of the radiopharmaceutical by the liver  There is normal filling of the intrahepatic hepatic biliary ducts  Some retention of radiotracer noted in the region of the common bile duct and common hepatic duct  CBD noted to be within normal limits on recent imaging  Gallbladder is seen filling within the 1st hour and more fully seen at 80 minutes where it is markedly enlarged as seen on recent CT  Impression: 1  Gallbladder filling compatible with a patent cystic duct  The gallbladder is noted to be markedly enlarged as seen on CT  Workstation performed: [id number]     US right upper quadrant    Result Date: 9/3/2021  Narrative: RIGHT UPPER QUADRANT ULTRASOUND INDICATION:     Elevated bilirubin   COMPARISON:  CT scan 8/5/2021 TECHNIQUE:   Real-time ultrasound of the right upper quadrant was performed with a curvilinear transducer with both volumetric sweeps and still imaging techniques  FINDINGS: PANCREAS:  Visualized portions of the pancreas are within normal limits  AORTA AND IVC:  Visualized portions are normal for patient age  Atherosclerotic changes are present  LIVER: Size:  Within normal range  The liver measures 13 5 cm in the midclavicular line  Contour:  Surface contour is smooth  Parenchyma:  Echogenicity and echotexture are within normal limits  No evidence of suspicious mass  A couple of hepatic cysts are noted measuring 7 to 8 mm in size  Limited imaging of the main portal vein shows it to be patent and hepatopetal   BILIARY: The gallbladder is normal in caliber  No wall thickening or pericholecystic fluid  There is layering sludge without evidence for shadowing calculi  No sonographic Krishnamurthy sign  No intrahepatic biliary dilatation  CBD measures 4 mm  No choledocholithiasis  KIDNEY: Right kidney measures 9 5 cm  There is a 3 5 cm cyst at the lower pole  Other smaller cysts are present  ASCITES:   None  Impression: Gallbladder sludge  Otherwise, no evidence of biliary ductal dilatation  Hepatic and right renal cysts  Workstation performed: [id number]       Labs and pertinent reports reviewed  This note has been generated by voice recognition software system  Therefore, there may be spelling, grammar, and or syntax errors  Please contact if questions arise

## 2021-09-10 NOTE — UTILIZATION REVIEW
Notification of Discharge   This is a Notification of Discharge from our facility [address]  Please be advised that this patient has been discharge from our facility  Below you will find the admission and discharge date and time including the patients disposition  UTILIZATION REVIEW CONTACT:  Sumeet Capone  Utilization   Network Utilization Review Department  Phone: [phone] x carefully listen to the prompts  All voicemails are confidential   Email: [email or web address]     PHYSICIAN ADVISORY SERVICES:  FOR [facility] REVIEW - MEDICAL NECESSITY DENIAL  Phone: [phone]  Fax: [phone]  Email: [email or web address]     PRESENTATION DATE: 9/3/2021  8:43 AM  OBERVATION ADMISSION DATE:   INPATIENT ADMISSION DATE: 9/3/21  6:49 PM   DISCHARGE DATE: 9/9/2021  8:30 PM  DISPOSITION: [address] [facility]      IMPORTANT INFORMATION:  Send all requests for admission clinical reviews, approved or denied determinations and any other requests to dedicated fax number below belonging to the campus where the patient is receiving treatment   List of dedicated fax numbers:  [address] DENIALS (Administrative/Medical Necessity) [phone]   [address] (Maternity/NICU/Pediatrics) [phone]   Osman Kirkpatrick [phone]   José Miguel Lilly [phone]   Marco A Kumari [phone]   Macy Abad [facility] [address] [place] [address] [phone]   [facility]  [phone]   [address] [facility]  [address] [facility] [address] [facility] [phone]

## 2021-09-10 NOTE — ASSESSMENT & PLAN NOTE
Wt Readings from Last 3 Encounters:   09/07/21 44 5 kg (98 lb)   08/11/21 48 4 kg (106 lb 12 8 oz)     Patient noted to have echo on June 2021 showing EF of 60 65%  Currently appears euvolemic; SpO2 97% on room air  Cardiac diet with sodium restriction  Ins and outs

## 2021-09-10 NOTE — ASSESSMENT & PLAN NOTE
Patient initially presented to [facility] for intractable nausea and vomiting  Subsequently found to have pancytopenia with fluctuating hemoglobin and declining platelets  Slightly elevated T bilirubin with elevated LDH; picture of hemolytic anemia  Currently hemoglobin stable at 7 7; transfuse for hemoglobin< 7 0  Additionally, platelets trending down; differential includes HI T versus malnutrition versus myelodysplastic disorder versus TMA (TTP) versus DIC versus infection  Evaluated by GI, EGD shows nonobstructing esophageal ring lower 3rd of the esophagus as well as hiatal hernia without Braden lesions; no further GI workup needed    Reticulocytes elevated 4 85%; less concerning for myelodysplastic anemia  Patient with bacteria on UA and white count less than 4 K; but no other SIRS criteria, continue to monitor off antibiotics at this time  With that in mind, hepatitis-C panel negative; consider HIV, but less likely infectious cause  Less likely TT P as fibrinogen is low and D-dimer is elevated  Four T-score-0; less likely HI T  Patient with stable B12 and folate labs; however BMI 16 31; concern for malnutrition  DIC concerning given low fibrinogen, elevated D-dimer, schistocytes, elevated PT/PTT and other findings; patient denies bleeding of gums; no petechia noted on exam   INR within normal limits; no apparent hepatic cause; rheumatoid factor negative; MANAN pending  Consult to Hematology

## 2021-09-10 NOTE — ASSESSMENT & PLAN NOTE
Wt Readings from Last 3 Encounters:   09/07/21 44 5 kg (98 lb)   08/11/21 48 4 kg (106 lb 12 8 oz)     · Patient noted to have echo on June 2021 showing EF of 60 65%  · Currently appears euvolemic  · Low-sodium diet, I&Os, daily weights  · Monitor volume status

## 2021-09-10 NOTE — ASSESSMENT & PLAN NOTE
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

## 2021-09-10 NOTE — ASSESSMENT & PLAN NOTE
Call placed to Dr. Hinojosa at 1343 and patient no longer requiring SDU level of care and okay for patient to go to telemetry bed. 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

## 2021-09-10 NOTE — ASSESSMENT & PLAN NOTE
· Patient initially presented to [facility] for intractable nausea and vomiting  Subsequently found to have pancytopenia with fluctuating hemoglobin and thrombocytopenia  · Patient was evaluated by GI, EGD showed nonobstructing esophageal ring in the lower 3rd of the esophagus as well as hiatal hernia without Braden lesions  No further GI workup recommended  · Workup:  Fibrinogen level has been trending up  Status post 2 units of cryoprecipitate 9/11  D-dimer also trending up  LDH significantly elevated  Total bilirubin has been elevated throughout hospitalization  Hepatitis-B and C panel negative  PTT/INR has been normal   Shama test normal   Reticulocyte count mildly elevated  Peripheral smear did show schistocytes and helmet cells  · CT negative for hypersplenism however did show distended gallbladder for which HIDA scan was done and was negative  · Hemoglobin is currently stable, trending up  Platelet count also trending up    · Appreciate hematology consultation

## 2021-09-10 NOTE — PROGRESS NOTES
[address] [facility]  Progress Note Valeria Velasco 1934, 80 y o  female MRN: [id number]  [facility] Encounter: [id number]  Primary Care Provider: Brandie Reed   Date and time admitted to hospital: 9/9/2021  9:43 PM    * Pancytopenia  Assessment & Plan  · Patient initially presented to [facility] for intractable nausea and vomiting  Subsequently found to have pancytopenia with fluctuating hemoglobin and thrombocytopenia  · Patient was evaluated by GI, EGD showed nonobstructing esophageal ring in the lower 3rd of the esophagus as well as hiatal hernia without Braden lesions  No further GI workup recommended  · Workup:  Fibrinogen level has been trending up  Status post 2 units of cryoprecipitate 9/11  D-dimer also trending up  LDH significantly elevated  Total bilirubin has been elevated throughout hospitalization  Hepatitis-B and C panel negative  PTT/INR has been normal   Shama test normal   Reticulocyte count mildly elevated  Peripheral smear did show schistocytes and helmet cells  · CT negative for hypersplenism however did show distended gallbladder for which HIDA scan was done and was negative  · Hemoglobin is currently stable, trending up  Platelet count also trending up  · Appreciate hematology consultation      Hemolytic anemia ([address] )  Assessment & Plan  · Plan as above  Hematology consult pending      Intractable nausea and vomiting  Assessment & Plan  · Patient initially presented with intractable nausea and vomiting; ultrasound right upper quadrant demonstrated gallbladder sludge  · CT abdomen showing distended gallbladder with mild surrounding inflammatory changes  · HIDA scan and showing patent cystic duct  · Prior to transfer, general surgery consulted and feel less likely acute cholecystitis  · GI performed EGD showing large sliding hiatal hernia without Braden lesions and nonobstructing esophageal ring in the lower 3rd of the esophagus  · Patient reports overall improvement symptoms; supportive care  · Follow-up with GI in the outpatient setting    Constipation  Assessment & Plan  · Continue bowel regimen    Parkinson's disease   Assessment & Plan  · Continue home medications  · PT/OT, fall precautions    Paroxysmal atrial fibrillation   Assessment & Plan  · Continue Lopressor  · Not on anticoagulation secondary to anemia  · Outpatient follow-up with Cardiology    Severe protein-calorie malnutrition  Assessment & Plan  Malnutrition Findings:           BMI Findings:    BMI 16 31  · Nutrition consult    History of transcatheter aortic valve replacement (TAVR)  Assessment & Plan  · Underwent TAVR placement on 07/2021  · 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  · Currently off Plavix as per Cardiology for intolerance  Can resume ASA when cleared by hematology  · Follow-up with cardiology in the outpatient setting    Chronic diastolic CHF  Assessment & Plan  Wt Readings from Last 3 Encounters:   09/07/21 44 5 kg (98 lb)   08/11/21 48 4 kg (106 lb 12 8 oz)     · Patient noted to have echo on June 2021 showing EF of 60 65%  · Currently appears euvolemic  · Low-sodium diet, I&Os, daily weights  · Monitor volume status          VTE Pharmacologic Prophylaxis:   Moderate Risk (Score 3-4) - Pharmacological DVT Prophylaxis Contraindicated  Sequential Compression Devices Ordered  Patient Centered Rounds: I performed bedside rounds with nursing staff today  Discussions with Specialists or Other Care Team Provider: nursing, case management     Education and Discussions with Family / Patient: Updated  (son) via phone  Time Spent for Care: 30 minutes  More than 50% of total time spent on counseling and coordination of care as described above      Current Length of Stay: 1 day(s)  Current Patient Status: Inpatient   Certification Statement: The patient will continue to require additional inpatient hospital stay due to trending CBC, hematology consult  Discharge Plan: Anticipate discharge in 48-72 hrs to discharge location to be determined pending rehab evaluations  Code Status: Level 3 - DNAR and DNI    Subjective:   Patient offers no acute complaints  No abdominal pain, nausea or vomiting  She does feel as though she needs to have a bowel movement and is requesting a suppository  Objective:     Vitals:   Temp (24hrs), Av 9 °F (36 6 °C), Min:97 8 °F (36 6 °C), Max:97 9 °F (36 6 °C)    Temp:  [97 8 °F (36 6 °C)-97 9 °F (36 6 °C)] 97 9 °F (36 6 °C)  HR:  [69-80] 69  Resp:  [20] 20  BP: (116-126)/(62-68) 116/68  SpO2:  [98 %] 98 %  There is no height or weight on file to calculate BMI  Input and Output Summary (last 24 hours):   No intake or output data in the 24 hours ending 09/10/21 1106    Physical Exam:   Physical Exam  Vitals and nursing note reviewed  Constitutional:       General: She is not in acute distress  Cardiovascular:      Rate and Rhythm: Normal rate  Pulmonary:      Breath sounds: Normal breath sounds  Abdominal:      Tenderness: There is no abdominal tenderness  Musculoskeletal:         General: No swelling  Skin:     General: Skin is warm  Neurological:      Mental Status: She is alert and oriented to person, place, and time  Mental status is at baseline     Psychiatric:         Mood and Affect: Mood normal          Additional Data:     Labs:  Results from last 7 days   Lab Units 09/10/21  0556 21  0513 21  1412 21  0551   WBC Thousand/uL 2 94* 3 24* 8 81   < >   HEMOGLOBIN g/dL 9 0* 7 7* 8 0*   < >   HEMATOCRIT % 28 6* 23 5* 25 0*   < >   PLATELETS Thousands/uL 80* 64* 63*   < >   BANDS PCT %  --  1  --   --    NEUTROS PCT %  --   --  88*  --    LYMPHS PCT %  --   --  4*  --    LYMPHO PCT %  --  16  --   --    MONOS PCT %  --   --  6  --    MONO PCT %  --  2*  --   --    EOS PCT %  --  1 1  --     < > = values in this interval not displayed  Results from last 7 days   Lab Units 09/10/21  0556   SODIUM mmol/L 136   POTASSIUM mmol/L 3 6   CHLORIDE mmol/L 101   CO2 mmol/L 28   BUN mg/dL 18   CREATININE mg/dL 0 57*   ANION GAP mmol/L 7   CALCIUM mg/dL 8 6   ALBUMIN g/dL 3 3*   TOTAL BILIRUBIN mg/dL 2 11*   ALK PHOS U/L 157*   ALT U/L 10*   AST U/L 58*   GLUCOSE RANDOM mg/dL 51*     Results from last 7 days   Lab Units 09/09/21  0513   INR  1 10     Results from last 7 days   Lab Units 09/07/21  0753 09/07/21  0717   POC GLUCOSE mg/dl 131 61*               Lines/Drains:  Invasive Devices     Peripheral Intravenous Line            Peripheral IV 09/08/21 Distal;Dorsal (posterior); Right Forearm 1 day                      Imaging: No pertinent imaging reviewed  Recent Cultures (last 7 days):         Last 24 Hours Medication List:   Current Facility-Administered Medications   Medication Dose Route Frequency Provider Last Rate    acetaminophen  650 mg Oral Q6H PRN Diamante Lagn MD      ALPRAZolam  0 25 mg Oral TID PRN Diamante Lang MD      atorvastatin  80 mg Oral Daily Diamatne Lang MD      bisacodyl  10 mg Rectal Daily PRN JADA Osei      carbidopa-levodopa  1 tablet Oral 5x Daily Diamante Lang MD      metoclopramide  10 mg Intravenous Q6H PRN Diamante Lang MD      metoprolol tartrate  25 mg Oral Q12H [address] Diamante Lang MD      mirtazapine  7 5 mg Oral HS Diamante Lang MD      pantoprazole  40 mg Oral Early Morning Diamante Lang MD      polyethylene glycol  17 g Oral Daily Diamante Lang MD      Safinamide Mesylate  100 mg Oral Daily Diamante Lang MD      saliva substitute  5 spray Mouth/Throat 4x Daily PRN Diamante Lang MD      senna-docusate sodium  1 tablet Oral HS Diamante Lang MD      trimethobenzamide  200 mg Intramuscular Q8H PRN Diamante Lang MD          Today, Patient Was Seen By: JADA Larry    **Please Note: This note may have been constructed using a voice recognition system  **

## 2021-09-10 NOTE — PHYSICAL THERAPY NOTE
Physical Therapy Evaluation Note    Patient Name: Kenya Carter    OYXSP'G Date: 9/10/2021     Problem List  Principal Problem:    Pancytopenia  Active Problems:    History of transcatheter aortic valve replacement (TAVR)    Parkinson's disease     Severe protein-calorie malnutrition    Constipation    Chronic diastolic CHF    Paroxysmal atrial fibrillation     Intractable nausea and vomiting    Hemolytic anemia (HCC)       Past Medical History  Past Medical History:   Diagnosis Date    Diastolic congestive heart failure ([facility] [address] )     DVT (deep venous thrombosis) (HCC)     History of transcatheter aortic valve replacement (TAVR)     [facility]     Parkinson's disease (HCC)     Paroxysmal atrial fibrillation (HCC)     UTI (urinary tract infection)         Past Surgical History  Past Surgical History:   Procedure Laterality Date    BREAST SURGERY      CARDIAC SURGERY      HYSTERECTOMY      REPLACEMENT AORTIC VALVE TRANSCATHETER (TAVR)      TUBAL LIGATION          09/10/21 1004   PT Last Visit   PT Visit Date 09/10/21   Note Type   Note type Evaluation   Pain Assessment   Pain Assessment Tool FLACC   Pain Rating: FLACC (Rest) - Face 0   Pain Rating: FLACC (Rest) - Legs 0   Pain Rating: FLACC (Rest) - Activity 0   Pain Rating: FLACC (Rest) - Cry 0   Pain Rating: FLACC (Rest) - Consolability 0   Score: FLACC (Rest) 0   Pain Rating: FLACC (Activity) - Face 1   Pain Rating: FLACC (Activity) - Legs 0   Pain Rating: FLACC (Activity) - Activity 0   Pain Rating: FLACC (Activity) - Cry 0   Pain Rating: FLACC (Activity) - Consolability 0   Score: FLACC (Activity) 1   Home Living   Type of Home House   Home Layout Able to live on main level with bedroom/bathroom;Stairs to enter with rails; One level  (FFSU; 2STE with HR)   Bathroom Shower/Tub Tub/shower unit   Bathroom Toilet Standard   Bathroom Equipment Grab bars in shower; Shower chair;Grab bars around toilet   [address] [facility]  (2 rollators; pt declines use )   Additional Comments Patient lives in a [facility] with [address] with [address]  Patient reports being independent with ADLs prior  Patient does have sons, who work fulltime, who live very close and are able to help  Patient does drive  Patient does have a cleaning service come to help with stuff around the house every other week  Prior Function   Level of [place] Independent with ADLs and functional mobility   Lives With Alone   Receives Help From Family; Other (Comment)  (sons and cleaning service )   ADL Assistance Independent   IADLs Independent   Falls in the last 6 months 0   Vocational Retired   Comments Patient does have support from her adult children (sons) who live close, as well as access to help from neighbors/friends  Restrictions/Precautions   Weight Bearing Precautions Per Order No   Other Precautions Fall Risk;Multiple lines   General   Family/Caregiver Present No   Cognition   Overall Cognitive Status WFL   Arousal/Participation Alert   Attention Within functional limits   Orientation Level Oriented X4  (one month off of time )   Memory Within functional limits   Following Commands Follows all commands and directions without difficulty   RLE Assessment   RLE Assessment X   Strength RLE   R Hip Flexion 3/5   R Knee Flexion 3/5   R Knee Extension 3/5   R Ankle Dorsiflexion 3/5   R Ankle Plantar Flexion 3/5   LLE Assessment   LLE Assessment X   Strength LLE   L Hip Flexion 3/5   L Knee Flexion 3-/5   L Knee Extension 3-/5  (Pt notes LLE weakness due to Parkinson's involvement)   L Ankle Dorsiflexion 3/5   L Ankle Plantar Flexion 3/5   Light Touch   RLE Light Touch Grossly intact   LLE Light Touch Impaired   LLE Light Touch Comments Pt  reports numbness/tingling on L side due to PD dx   Bed Mobility   Rolling R 5  Supervision   Additional items Assist x 1; Increased time required;HOB elevated   Supine to Sit 5  Supervision   Additional items Assist x 1;HOB elevated   Transfers   Sit to Stand 4  Minimal assistance   Additional items Assist x 1; Increased time required   Stand to Sit 5  Supervision   Additional items Assist x 1   Ambulation/Elevation   Gait pattern Excessively slow; Forward Flexion  (did not stay within RW)   Gait Assistance 5  Supervision   Additional items Assist x 1   Assistive Device Rolling walker   Distance 25x2  (25', rest, 25'with stair navigation in between)   Stair Management Assistance 4  Minimal assist   Additional items Assist x 1;Verbal cues  (VCs to sequence with step-to pattern on descent)   Stair Management Technique One rail R;Other (Comment)  (HHA on L to replicate home setup; step to on descent)   Number of Stairs 7   Balance   Static Sitting Good   Dynamic Sitting Fair   Static Standing Fair   Dynamic Standing Fair -   Ambulatory Fair -   Endurance Deficit   Endurance Deficit Yes   Endurance Deficit Description pt reported fatigue with stair navigation   Activity Tolerance   Activity Tolerance Patient limited by fatigue;Patient tolerated treatment well   Nurse Made Aware RN approved session   Assessment   Prognosis Fair   Problem List Decreased strength;Decreased endurance; Impaired balance;Decreased mobility; Impaired sensation   Assessment Patient is a 80year old female who was admitted to [place] [address] with primary complaints of nausea and vomiting  Patient dxs include: pancytopenia, hemolytic anemia, intractable nausea and vomiting, paroxysmal atrial fibrillation, CHF, constipation, severe protein-calorie malnutrition, PD, and a hx of TAVR  Patient lives alone in a one story home with [address] and [address]  Patient reports being independent with ADLs and iADLs prior  Patient does have support from her adult children who live in close proximity to her, as well as from neighbors/friends, and a cleaning service who comes biweekly   Patient currently presents with general deconditioning, high fall risk, LE weakness-- with greater impairment on the LLE vs  RLE, decreased mobility, impaired standing dynamic balance, as well as impaired activity tolerance -- all of which limit her ability to return to her PLOF and independence within her community  Patient would benefit from additional patient education regarding appropriate techniques to support safe stair navigation-- including using a step to pattern and BUE support  Patient is appropriate to receive skilled PT intervention in order to build LE strength, as well as to improve her capacity to tolerate greater levels of activity required for participation in her community  PT recommendation is d/c to home with home health PT  Patient was left in her recliner, chair alarm on, with her call bell within reach  PT will continue to follow  Barriers to Discharge None   Goals   STG Expiration Date 09/24/21   Short Term Goal #1 STG 1: Patient will perform supine<>sit and scooting components of bed mobility independently in order to facilitate sucessful return to navigation of a standard bed within her home  STG 2: Patient will perform STS transfer jacquelyn/independently with a RW or least restrictive device in order to support successful management of low surfaces within her home  STG 3: Patient will ambulate with a RW or least restrictive AD >/= 300 ft independently in order to improve upon her capacity to safely navigate her home  STG 4: Patient will negotiate 2 steps independently without the need for verbal cues for safe techniques in order to demonstrate her ability to enter and exit her home for doctor's appointments  Plan   Treatment/Interventions ADL retraining;Functional transfer training;LE strengthening/ROM; Elevations; Therapeutic exercise; Endurance training;Equipment eval/education; Bed mobility;Gait training;Spoke to nursing;Family   PT Frequency   (3-5/wk)   Recommendation   PT Discharge Recommendation Home with home health rehabilitation   PT - OK to Discharge Yes  (yes with HHPT )   AM-PAC Basic Mobility Inpatient   Turning in Bed Without Bedrails 3   Lying on Back to Sitting on Edge of Flat Bed 3   Moving Bed to Chair 3   Standing Up From Chair 3   Walk in Room 3   Climb 3-5 Stairs 3   Basic Mobility Inpatient Raw Score 18   Basic Mobility Standardized Score 41 05         Judge Brown, SPT  9/10/2021

## 2021-09-10 NOTE — ASSESSMENT & PLAN NOTE
· Patient initially presented with intractable nausea and vomiting; ultrasound right upper quadrant demonstrated gallbladder sludge  · CT abdomen showing distended gallbladder with mild surrounding inflammatory changes  · HIDA scan and showing patent cystic duct  · Prior to transfer, general surgery consulted and feel less likely acute cholecystitis  · GI performed EGD showing large sliding hiatal hernia without Braden lesions and nonobstructing esophageal ring in the lower 3rd of the esophagus  · Patient reports overall improvement symptoms; supportive care  · Follow-up with GI in the outpatient setting

## 2021-09-10 NOTE — CASE MANAGEMENT
Not a bundle  Pt is a less than 30 day readmission  Per prior notes  lives alone in ranch home with 1 homero  IPTA  Drives  DME rollator  No h/o vna or rhb  Denies any MH,D&A tx  Uses [facility] [address]  Emergency contact, debbie Garibay [place] [phone]  CM reviewed d/c planning process including the following: identifying help at home, patient preference for d/c planning needs, Discharge Lounge, [facility] Meds to Bed program, availability of treatment team to discuss questions or concerns patient and/or family may have regarding understanding medications and recognizing signs and symptoms once discharged  CM also encouraged patient to follow up with all recommended appointments after discharge  Patient advised of importance for patient and family to participate in managing patients medical well being

## 2021-09-10 NOTE — ASSESSMENT & PLAN NOTE
As per transferring team discharge summary:  · "Hemolysis workup is positive she has positive schistocytes positive low haptoglobin elevated LDH elevated unconjugated bilirubinemia  Shama test is negative unknown etiology at this time kody pending, rf ordered  · On previous CT abdomen and pelvis there is no evidence of hypersplenism  · No evidence of bleed and the EGD although she never had a colonoscopy her previous occult were negative and she had no iron deficiency on the iron panel no recent medicines to cause it no evidence of any infection evident at this time  She has been transfused during this admission as well    · Transfuse if less than 7 5  · Discussing and managing the case with Hematology-Oncology from [facility]  · Patient had a bioprosthetic aortic valve replacement on June 29, 2021 at [facility] she had an 2D echo done on August 30, 2021 which showed a mild perivalvular leak which I have reached out in discussed with her cardiothoracic surgeon group at [facility] with leak being so mild they do not think this is attributing to acute hemolytic anemia- being transferred to [facility]"    Consult to Hematology for further evaluation

## 2021-09-10 NOTE — ASSESSMENT & PLAN NOTE
· Continue Lopressor  · Not on anticoagulation secondary to anemia    · Outpatient follow-up with Cardiology

## 2021-09-11 LAB
ALBUMIN SERPL BCP-MCNC: 3 G/DL (ref 3.5–5)
ALP SERPL-CCNC: 130 U/L (ref 46–116)
ALT SERPL W P-5'-P-CCNC: 10 U/L (ref 12–78)
ANION GAP SERPL CALCULATED.3IONS-SCNC: 5 MMOL/L (ref 4–13)
AST SERPL W P-5'-P-CCNC: 49 U/L (ref 5–45)
BILIRUB SERPL-MCNC: 1.68 MG/DL (ref 0.2–1)
BUN SERPL-MCNC: 18 MG/DL (ref 5–25)
CALCIUM ALBUM COR SERPL-MCNC: 9 MG/DL (ref 8.3–10.1)
CALCIUM SERPL-MCNC: 8.2 MG/DL (ref 8.3–10.1)
CHLORIDE SERPL-SCNC: 101 MMOL/L (ref 100–108)
CO2 SERPL-SCNC: 31 MMOL/L (ref 21–32)
CREAT SERPL-MCNC: 0.5 MG/DL (ref 0.6–1.3)
ERYTHROCYTE [DISTWIDTH] IN BLOOD BY AUTOMATED COUNT: 20.4 % (ref 11.6–15.1)
GFR SERPL CREATININE-BSD FRML MDRD: 88 ML/MIN/1.73SQ M
GLUCOSE SERPL-MCNC: 65 MG/DL (ref 65–140)
HCT VFR BLD AUTO: 24.6 % (ref 34.8–46.1)
HGB BLD-MCNC: 7.8 G/DL (ref 11.5–15.4)
MCH RBC QN AUTO: 30 PG (ref 26.8–34.3)
MCHC RBC AUTO-ENTMCNC: 31.7 G/DL (ref 31.4–37.4)
MCV RBC AUTO: 95 FL (ref 82–98)
NRBC BLD AUTO-RTO: 0 /100 WBCS
PLATELET # BLD AUTO: 75 THOUSANDS/UL (ref 149–390)
POTASSIUM SERPL-SCNC: 3.4 MMOL/L (ref 3.5–5.3)
PROT SERPL-MCNC: 5.5 G/DL (ref 6.4–8.2)
RBC # BLD AUTO: 2.6 MILLION/UL (ref 3.81–5.12)
SODIUM SERPL-SCNC: 137 MMOL/L (ref 136–145)
WBC # BLD AUTO: 2.79 THOUSAND/UL (ref 4.31–10.16)

## 2021-09-11 PROCEDURE — 80053 COMPREHEN METABOLIC PANEL: CPT | Performed by: NURSE PRACTITIONER

## 2021-09-11 PROCEDURE — 99232 SBSQ HOSP IP/OBS MODERATE 35: CPT | Performed by: NURSE PRACTITIONER

## 2021-09-11 PROCEDURE — 85027 COMPLETE CBC AUTOMATED: CPT | Performed by: NURSE PRACTITIONER

## 2021-09-11 RX ORDER — SODIUM CHLORIDE 9 MG/ML
50 INJECTION, SOLUTION INTRAVENOUS ONCE
Status: COMPLETED | OUTPATIENT
Start: 2021-09-11 | End: 2021-09-12

## 2021-09-11 RX ORDER — ONDANSETRON 2 MG/ML
4 INJECTION INTRAMUSCULAR; INTRAVENOUS
Status: DISCONTINUED | OUTPATIENT
Start: 2021-09-11 | End: 2021-09-14 | Stop reason: HOSPADM

## 2021-09-11 RX ORDER — POTASSIUM CHLORIDE 20 MEQ/1
40 TABLET, EXTENDED RELEASE ORAL ONCE
Status: COMPLETED | OUTPATIENT
Start: 2021-09-11 | End: 2021-09-11

## 2021-09-11 RX ADMIN — DOCUSATE SODIUM AND SENNOSIDES 1 TABLET: 8.6; 5 TABLET ORAL at 20:31

## 2021-09-11 RX ADMIN — ATORVASTATIN CALCIUM 80 MG: 80 TABLET, FILM COATED ORAL at 10:10

## 2021-09-11 RX ADMIN — CARBIDOPA AND LEVODOPA 1 TABLET: 50; 200 TABLET, EXTENDED RELEASE ORAL at 19:50

## 2021-09-11 RX ADMIN — POTASSIUM CHLORIDE 40 MEQ: 1500 TABLET, EXTENDED RELEASE ORAL at 12:15

## 2021-09-11 RX ADMIN — POLYETHYLENE GLYCOL 3350 17 G: 17 POWDER, FOR SOLUTION ORAL at 10:10

## 2021-09-11 RX ADMIN — CARBIDOPA AND LEVODOPA 1 TABLET: 50; 200 TABLET, EXTENDED RELEASE ORAL at 10:11

## 2021-09-11 RX ADMIN — CARBIDOPA AND LEVODOPA 1 TABLET: 50; 200 TABLET, EXTENDED RELEASE ORAL at 14:32

## 2021-09-11 RX ADMIN — SODIUM CHLORIDE 50 ML/HR: 0.9 INJECTION, SOLUTION INTRAVENOUS at 16:36

## 2021-09-11 RX ADMIN — ONDANSETRON 4 MG: 2 INJECTION INTRAMUSCULAR; INTRAVENOUS at 16:28

## 2021-09-11 RX ADMIN — ONDANSETRON 4 MG: 2 INJECTION INTRAMUSCULAR; INTRAVENOUS at 12:14

## 2021-09-11 RX ADMIN — PANTOPRAZOLE SODIUM 40 MG: 20 TABLET, DELAYED RELEASE ORAL at 05:40

## 2021-09-11 RX ADMIN — CARBIDOPA AND LEVODOPA 1 TABLET: 50; 200 TABLET, EXTENDED RELEASE ORAL at 05:40

## 2021-09-11 RX ADMIN — MIRTAZAPINE 7.5 MG: 15 TABLET, FILM COATED ORAL at 20:31

## 2021-09-11 RX ADMIN — METOPROLOL TARTRATE 25 MG: 25 TABLET, FILM COATED ORAL at 20:31

## 2021-09-11 NOTE — ASSESSMENT & PLAN NOTE
· Patient initially presented with intractable nausea and vomiting; ultrasound right upper quadrant demonstrated gallbladder sludge  · CT abdomen showing distended gallbladder with mild surrounding inflammatory changes  · HIDA scan and showing patent cystic duct  · Prior to transfer, general surgery consulted and feel less likely acute cholecystitis  · GI performed EGD showing large sliding hiatal hernia without Braden lesions and nonobstructing esophageal ring in the lower 3rd of the esophagus  · Patient reports ongoing nausea and overall poor appetite  Only able to take spoonfuls of food  Already on Remeron  Will start zofran before meals  Patient also requesting to hold safinamide as she believes this is contributing  If no improvement, will re-involve GI

## 2021-09-11 NOTE — ASSESSMENT & PLAN NOTE
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

## 2021-09-11 NOTE — ASSESSMENT & PLAN NOTE
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

## 2021-09-11 NOTE — ASSESSMENT & PLAN NOTE
· Continue home dose of Sinemet  Patient requesting to hold Safinamide tablets as she believes this is what is contributing to her nausea and metallic taste    · Outpatient f/u with neurology at [facility]  · PT/OT recommending home with [facility], fall precautions

## 2021-09-11 NOTE — ASSESSMENT & PLAN NOTE
· Patient initially presented to [facility] for intractable nausea and vomiting  Subsequently found to have pancytopenia with fluctuating hemoglobin and thrombocytopenia which is multifactorial in the setting of suspected bone marrow failure and hemolytic anemia  · Patient was evaluated by GI, EGD showed nonobstructing esophageal ring in the lower 3rd of the esophagus as well as hiatal hernia without Braden lesions  No further GI workup recommended  · Workup:  Fibrinogen level has been trending up  Status post 2 units of cryoprecipitate 9/11  D-dimer also trending up  LDH significantly elevated  Total bilirubin has been elevated throughout hospitalization  Hepatitis-B and C panel negative  PTT/INR has been normal   Shama test normal   Reticulocyte count mildly elevated  Peripheral smear did show schistocytes and helmet cells  · CT negative for hypersplenism however did show distended gallbladder for which HIDA scan was done and was negative  · Hemoglobin and platelet count have been stable  · Hematology following, suspect bone marrow suppression or bone marrow failure  Bone marrow biopsy planned for Monday in IR

## 2021-09-11 NOTE — ASSESSMENT & PLAN NOTE
Malnutrition Findings:   Adult Malnutrition type: Acute illness (related to altered GI function; lack of appetite; at risk for skin breakdown)  Adult Degree of Malnutrition: Other severe protein calorie malnutrition (energy intake meeting < 50% needs for > 5 days; 8 4% wt loss x1 month; severe fat loss to buccal pads and orbitals, severe muscle loss to temporals, clavicals; treated with PO diet and nturtion supplements)    BMI Findings:  Adult BMI Classifications: Underweight < 18 5 BMI 16 31  · Nutrition following     · Nutritional supplements

## 2021-09-11 NOTE — PROGRESS NOTES
[address] [facility]  Progress Note Valeria Velasco 1934, 80 y o  female MRN: [id number]  [facility] Encounter: [id number]  Primary Care Provider: Brandie Reed   Date and time admitted to hospital: 9/9/2021  9:43 PM    * Pancytopenia  Assessment & Plan  · Patient initially presented to [facility] for intractable nausea and vomiting  Subsequently found to have pancytopenia with fluctuating hemoglobin and thrombocytopenia which is multifactorial in the setting of suspected bone marrow failure and hemolytic anemia  · Patient was evaluated by GI, EGD showed nonobstructing esophageal ring in the lower 3rd of the esophagus as well as hiatal hernia without Braden lesions  No further GI workup recommended  · Workup:  Fibrinogen level has been trending up  Status post 2 units of cryoprecipitate 9/11  D-dimer also trending up  LDH significantly elevated  Total bilirubin has been elevated throughout hospitalization  Hepatitis-B and C panel negative  PTT/INR has been normal   Shama test normal   Reticulocyte count mildly elevated  Peripheral smear did show schistocytes and helmet cells  · CT negative for hypersplenism however did show distended gallbladder for which HIDA scan was done and was negative  · Hemoglobin and platelet count have been stable  · Hematology following, suspect bone marrow suppression or bone marrow failure  Bone marrow biopsy planned for Monday in IR  Hemolytic anemia ([facility] [address] )  Assessment & Plan  · Hematology following  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, 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    · Hemoglobin and platelet count relatively stable, will monitor    Intractable nausea and vomiting  Assessment & Plan  · Patient initially presented with intractable nausea and vomiting; ultrasound right upper quadrant demonstrated gallbladder sludge  · CT abdomen showing distended gallbladder with mild surrounding inflammatory changes  · HIDA scan and showing patent cystic duct  · Prior to transfer, general surgery consulted and feel less likely acute cholecystitis  · GI performed EGD showing large sliding hiatal hernia without Braden lesions and nonobstructing esophageal ring in the lower 3rd of the esophagus  · Patient reports ongoing nausea and overall poor appetite  Only able to take spoonfuls of food  Already on Remeron  Will start zofran before meals  Patient also requesting to hold safinamide as she believes this is contributing  If no improvement, will re-involve GI  Constipation  Assessment & Plan  · Continue bowel regimen    Parkinson's disease   Assessment & Plan  · Continue home dose of Sinemet  Patient requesting to hold Safinamide tablets as she believes this is what is contributing to her nausea and metallic taste  · Outpatient f/u with neurology at [facility]  · PT/OT recommending home with [facility], fall precautions    Paroxysmal atrial fibrillation   Assessment & Plan  · Continue Lopressor  · Not on anticoagulation secondary to anemia  · Outpatient follow-up with Cardiology    Severe protein-calorie malnutrition  Assessment & Plan  Malnutrition Findings:   Adult Malnutrition type: Acute illness (related to altered GI function; lack of appetite; at risk for skin breakdown)  Adult Degree of Malnutrition: Other severe protein calorie malnutrition (energy intake meeting < 50% needs for > 5 days; 8 4% wt loss x1 month; severe fat loss to buccal pads and orbitals, severe muscle loss to temporals, clavicals; treated with PO diet and nturtion supplements)    BMI Findings:  Adult BMI Classifications: Underweight < 18 5 BMI 16 31  · Nutrition following  · Nutritional supplements    History of transcatheter aortic valve replacement (TAVR)  Assessment & Plan  · Underwent TAVR placement on 07/2021  Does have perivascular leaking which per hematology can cause hemolytic anemia     · 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  · Currently off Plavix as per Cardiology for intolerance  Can resume ASA when cleared by hematology  · Follow-up with cardiology in the outpatient setting    Chronic diastolic CHF  Assessment & Plan  Wt Readings from Last 3 Encounters:   21 44 5 kg (98 lb)   21 48 4 kg (106 lb 12 8 oz)     · Patient noted to have echo on 2021 showing EF of 60 65%  · Currently appears euvolemic  · Low-sodium diet, I&Os, daily weights  · Monitor volume status          VTE Pharmacologic Prophylaxis:   Moderate Risk (Score 3-4) - Pharmacological DVT Prophylaxis Contraindicated  Sequential Compression Devices Ordered  Patient Centered Rounds: I performed bedside rounds with nursing staff today  Discussions with Specialists or Other Care Team Provider: nursing, hematology     Education and Discussions with Family / Patient: Updated  (son and daughter in law) at bedside  Time Spent for Care: 30 minutes  More than 50% of total time spent on counseling and coordination of care as described above  Current Length of Stay: 2 day(s)  Current Patient Status: Inpatient   Certification Statement: The patient will continue to require additional inpatient hospital stay due to Pending improvement in symptoms, bone marrow biopsy on Monday  Discharge Plan: Anticipate discharge in 48-72 hrs to home with home services  Code Status: Level 3 - DNAR and DNI    Subjective:   Patient continues complain of poor appetite and intermittent nausea  No abdominal pain  Constipation improved  Objective:     Vitals:   Temp (24hrs), Av 8 °F (36 6 °C), Min:97 2 °F (36 2 °C), Max:98 2 °F (36 8 °C)    Temp:  [97 2 °F (36 2 °C)-98 2 °F (36 8 °C)] 98 1 °F (36 7 °C)  HR:  [70-75] 70  Resp:  [16-18] 16  BP: (103-123)/(54-69) 103/54  SpO2:  [97 %] 97 %  Body mass index is 16 31 kg/m²  Input and Output Summary (last 24 hours):      Intake/Output Summary (Last 24 hours) at 9/11/2021 1102  Last data filed at 9/10/2021 1900  Gross per 24 hour   Intake 120 ml   Output --   Net 120 ml       Physical Exam:   Physical Exam  Vitals and nursing note reviewed  Constitutional:       Appearance: She is cachectic  Comments: Weak and frail appearing   HENT:      Head: Normocephalic  Cardiovascular:      Rate and Rhythm: Normal rate  Pulmonary:      Breath sounds: Decreased breath sounds present  Abdominal:      Tenderness: There is no abdominal tenderness  Musculoskeletal:         General: No swelling  Comments: Kyphosis    Skin:     General: Skin is warm  Neurological:      Mental Status: She is alert and oriented to person, place, and time  Mental status is at baseline  Psychiatric:         Mood and Affect: Mood normal           Additional Data:     Labs:  Results from last 7 days   Lab Units 09/11/21  0541 09/09/21  0513 09/08/21  1412 09/06/21  0551   WBC Thousand/uL 2 79* 3 24* 8 81   < >   HEMOGLOBIN g/dL 7 8* 7 7* 8 0*   < >   HEMATOCRIT % 24 6* 23 5* 25 0*   < >   PLATELETS Thousands/uL 75* 64* 63*   < >   BANDS PCT %  --  1  --   --    NEUTROS PCT %  --   --  88*  --    LYMPHS PCT %  --   --  4*  --    LYMPHO PCT %  --  16  --   --    MONOS PCT %  --   --  6  --    MONO PCT %  --  2*  --   --    EOS PCT %  --  1 1  --     < > = values in this interval not displayed       Results from last 7 days   Lab Units 09/11/21  0541   SODIUM mmol/L 137   POTASSIUM mmol/L 3 4*   CHLORIDE mmol/L 101   CO2 mmol/L 31   BUN mg/dL 18   CREATININE mg/dL 0 50*   ANION GAP mmol/L 5   CALCIUM mg/dL 8 2*   ALBUMIN g/dL 3 0*   TOTAL BILIRUBIN mg/dL 1 68*   ALK PHOS U/L 130*   ALT U/L 10*   AST U/L 49*   GLUCOSE RANDOM mg/dL 65     Results from last 7 days   Lab Units 09/09/21  0513   INR  1 10     Results from last 7 days   Lab Units 09/07/21  0753 09/07/21  0717   POC GLUCOSE mg/dl 131 61*               Lines/Drains:  Invasive Devices     Peripheral Intravenous Line            Peripheral IV 09/08/21 Distal;Dorsal (posterior); Right Forearm 2 days                      Imaging: No pertinent imaging reviewed  Recent Cultures (last 7 days):         Last 24 Hours Medication List:   Current Facility-Administered Medications   Medication Dose Route Frequency Provider Last Rate    acetaminophen  650 mg Oral Q6H PRN Minal Ochoa MD      ALPRAZolam  0 25 mg Oral TID PRN Minal Ochoa MD      atorvastatin  80 mg Oral Daily Minal Ochoa MD      bisacodyl  10 mg Rectal Daily PRN JADA Woo Aas      carbidopa-levodopa  1 tablet Oral 5x Daily Minal Ochoa MD      metoclopramide  10 mg Intravenous Q6H PRN Minal Ochoa MD      metoprolol tartrate  25 mg Oral Q12H [facility] Minal Ochoa MD      mirtazapine  7 5 mg Oral HS Minal Ochoa MD      ondansetron  4 mg Intravenous TID AC JADA Hines      pantoprazole  40 mg Oral Early Morning Minal Ochoa MD      polyethylene glycol  17 g Oral Daily Minal Ochoa MD      potassium chloride  40 mEq Oral Once JADA Velazquez      saliva substitute  5 spray Mouth/Throat 4x Daily PRN Minal Ochoa MD      senna-docusate sodium  1 tablet Oral HS Minal Ochoa MD      trimethobenzamide  200 mg Intramuscular Q8H PRN Minal Ochoa MD          Today, Patient Was Seen By: JADA Velazquez    **Please Note: This note may have been constructed using a voice recognition system  **

## 2021-09-12 PROBLEM — Z71.89 GOALS OF CARE, COUNSELING/DISCUSSION: Status: ACTIVE | Noted: 2021-09-12

## 2021-09-12 LAB
ALBUMIN SERPL BCP-MCNC: 2.8 G/DL (ref 3.5–5)
ALP SERPL-CCNC: 115 U/L (ref 46–116)
ALT SERPL W P-5'-P-CCNC: 8 U/L (ref 12–78)
ANION GAP SERPL CALCULATED.3IONS-SCNC: 5 MMOL/L (ref 4–13)
ANISOCYTOSIS BLD QL SMEAR: PRESENT
AST SERPL W P-5'-P-CCNC: 50 U/L (ref 5–45)
BASOPHILS # BLD AUTO: 0.02 THOUSANDS/ΜL (ref 0–0.1)
BASOPHILS NFR BLD AUTO: 1 % (ref 0–1)
BILIRUB SERPL-MCNC: 1.52 MG/DL (ref 0.2–1)
BUN SERPL-MCNC: 16 MG/DL (ref 5–25)
CALCIUM ALBUM COR SERPL-MCNC: 9 MG/DL (ref 8.3–10.1)
CALCIUM SERPL-MCNC: 8 MG/DL (ref 8.3–10.1)
CHLORIDE SERPL-SCNC: 103 MMOL/L (ref 100–108)
CO2 SERPL-SCNC: 28 MMOL/L (ref 21–32)
CREAT SERPL-MCNC: 0.46 MG/DL (ref 0.6–1.3)
EOSINOPHIL # BLD AUTO: 0.04 THOUSAND/ΜL (ref 0–0.61)
EOSINOPHIL NFR BLD AUTO: 2 % (ref 0–6)
ERYTHROCYTE [DISTWIDTH] IN BLOOD BY AUTOMATED COUNT: 20.1 % (ref 11.6–15.1)
GFR SERPL CREATININE-BSD FRML MDRD: 90 ML/MIN/1.73SQ M
GLUCOSE SERPL-MCNC: 57 MG/DL (ref 65–140)
HCT VFR BLD AUTO: 22.7 % (ref 34.8–46.1)
HGB BLD-MCNC: 7.2 G/DL (ref 11.5–15.4)
IMM GRANULOCYTES # BLD AUTO: 0.02 THOUSAND/UL (ref 0–0.2)
IMM GRANULOCYTES NFR BLD AUTO: 1 % (ref 0–2)
LYMPHOCYTES # BLD AUTO: 0.23 THOUSANDS/ΜL (ref 0.6–4.47)
LYMPHOCYTES NFR BLD AUTO: 8 % (ref 14–44)
MCH RBC QN AUTO: 29.9 PG (ref 26.8–34.3)
MCHC RBC AUTO-ENTMCNC: 31.7 G/DL (ref 31.4–37.4)
MCV RBC AUTO: 94 FL (ref 82–98)
MONOCYTES # BLD AUTO: 0.28 THOUSAND/ΜL (ref 0.17–1.22)
MONOCYTES NFR BLD AUTO: 10 % (ref 4–12)
NEUTROPHILS # BLD AUTO: 2.14 THOUSANDS/ΜL (ref 1.85–7.62)
NEUTS SEG NFR BLD AUTO: 78 % (ref 43–75)
NRBC BLD AUTO-RTO: 0 /100 WBCS
PLATELET # BLD AUTO: 75 THOUSANDS/UL (ref 149–390)
PLATELET BLD QL SMEAR: ABNORMAL
POIKILOCYTOSIS BLD QL SMEAR: PRESENT
POTASSIUM SERPL-SCNC: 3.5 MMOL/L (ref 3.5–5.3)
PROT SERPL-MCNC: 5.2 G/DL (ref 6.4–8.2)
RBC # BLD AUTO: 2.41 MILLION/UL (ref 3.81–5.12)
RBC MORPH BLD: PRESENT
SCHISTOCYTES BLD QL SMEAR: PRESENT
SODIUM SERPL-SCNC: 136 MMOL/L (ref 136–145)
WBC # BLD AUTO: 2.73 THOUSAND/UL (ref 4.31–10.16)

## 2021-09-12 PROCEDURE — 85027 COMPLETE CBC AUTOMATED: CPT | Performed by: NURSE PRACTITIONER

## 2021-09-12 PROCEDURE — 85007 BL SMEAR W/DIFF WBC COUNT: CPT | Performed by: NURSE PRACTITIONER

## 2021-09-12 PROCEDURE — 80053 COMPREHEN METABOLIC PANEL: CPT | Performed by: NURSE PRACTITIONER

## 2021-09-12 PROCEDURE — 85025 COMPLETE CBC W/AUTO DIFF WBC: CPT | Performed by: NURSE PRACTITIONER

## 2021-09-12 PROCEDURE — 99232 SBSQ HOSP IP/OBS MODERATE 35: CPT | Performed by: INTERNAL MEDICINE

## 2021-09-12 PROCEDURE — 99232 SBSQ HOSP IP/OBS MODERATE 35: CPT | Performed by: NURSE PRACTITIONER

## 2021-09-12 RX ORDER — BISACODYL 10 MG
10 SUPPOSITORY, RECTAL RECTAL DAILY PRN
Status: DISCONTINUED | OUTPATIENT
Start: 2021-09-12 | End: 2021-09-14 | Stop reason: HOSPADM

## 2021-09-12 RX ORDER — POLYETHYLENE GLYCOL 3350 17 G/17G
17 POWDER, FOR SOLUTION ORAL DAILY PRN
Status: DISCONTINUED | OUTPATIENT
Start: 2021-09-12 | End: 2021-09-12

## 2021-09-12 RX ORDER — POLYETHYLENE GLYCOL 3350 17 G/17G
17 POWDER, FOR SOLUTION ORAL DAILY PRN
Status: DISCONTINUED | OUTPATIENT
Start: 2021-09-12 | End: 2021-09-14 | Stop reason: HOSPADM

## 2021-09-12 RX ADMIN — CARBIDOPA AND LEVODOPA 1 TABLET: 50; 200 TABLET, EXTENDED RELEASE ORAL at 09:34

## 2021-09-12 RX ADMIN — CARBIDOPA AND LEVODOPA 1 TABLET: 50; 200 TABLET, EXTENDED RELEASE ORAL at 17:37

## 2021-09-12 RX ADMIN — MIRTAZAPINE 7.5 MG: 15 TABLET, FILM COATED ORAL at 22:42

## 2021-09-12 RX ADMIN — METOPROLOL TARTRATE 25 MG: 25 TABLET, FILM COATED ORAL at 09:34

## 2021-09-12 RX ADMIN — ONDANSETRON 4 MG: 2 INJECTION INTRAMUSCULAR; INTRAVENOUS at 12:47

## 2021-09-12 RX ADMIN — CARBIDOPA AND LEVODOPA 1 TABLET: 50; 200 TABLET, EXTENDED RELEASE ORAL at 05:18

## 2021-09-12 RX ADMIN — ONDANSETRON 4 MG: 2 INJECTION INTRAMUSCULAR; INTRAVENOUS at 05:17

## 2021-09-12 RX ADMIN — CARBIDOPA AND LEVODOPA 1 TABLET: 50; 200 TABLET, EXTENDED RELEASE ORAL at 22:42

## 2021-09-12 RX ADMIN — ONDANSETRON 4 MG: 2 INJECTION INTRAMUSCULAR; INTRAVENOUS at 17:36

## 2021-09-12 RX ADMIN — CARBIDOPA AND LEVODOPA 1 TABLET: 50; 200 TABLET, EXTENDED RELEASE ORAL at 14:59

## 2021-09-12 RX ADMIN — ALPRAZOLAM 0.25 MG: 0.25 TABLET ORAL at 22:42

## 2021-09-12 RX ADMIN — PANTOPRAZOLE SODIUM 40 MG: 20 TABLET, DELAYED RELEASE ORAL at 05:18

## 2021-09-12 RX ADMIN — ATORVASTATIN CALCIUM 80 MG: 80 TABLET, FILM COATED ORAL at 09:34

## 2021-09-12 RX ADMIN — ACETAMINOPHEN 650 MG: 325 TABLET, FILM COATED ORAL at 05:18

## 2021-09-12 NOTE — ASSESSMENT & PLAN NOTE
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

## 2021-09-12 NOTE — QUICK NOTE
Family meeting held with patient, 2 sons and daughter-in-law  Discussed hospital course, workup thus far, acute medical problems and ongoing symptoms  Patient continues to express wishes for hospice/comfort care  Does not want to proceed with bone marrow biopsy or feeding tube placement  Family now all in agreement  Would ideally like to pursue SNF care with eventual hospice services  Case Management Kelly Boeck) made aware and facility choices provided to case management  Holding off on additional blood work  Change code status to comfort care  Statin and other non-essential medications discontinued given ongoing nausea  Palliative Care will see patient for assistance with symptom management      Yung Cardenas, [address] [place] [address] Internal Medicine

## 2021-09-12 NOTE — ASSESSMENT & PLAN NOTE
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

## 2021-09-12 NOTE — ASSESSMENT & PLAN NOTE
· Continue Lopressor  · Not on anticoagulation secondary to anemia and now with plans for hospice cares

## 2021-09-12 NOTE — ASSESSMENT & PLAN NOTE
Malnutrition Findings:   Adult Malnutrition type: Acute illness (related to altered GI function; lack of appetite; at risk for skin breakdown)  Adult Degree of Malnutrition: Other severe protein calorie malnutrition (energy intake meeting < 50% needs for > 5 days; 8 4% wt loss x1 month; severe fat loss to buccal pads and orbitals, severe muscle loss to temporals, clavicals; treated with PO diet and nturtion supplements)    BMI Findings:  Adult BMI Classifications: Underweight < 18 5 BMI 16 31  · Diet/supplements as tolerated  · Patient clear she does not want feeding tube or artificial means of nutrition  Plan for hospice

## 2021-09-12 NOTE — ASSESSMENT & PLAN NOTE
· Patient expresses wishes for hospice cares  Does not want BM biopsy as would not   · Does not want feeding tube or alternative means of nutrition  · Discussed with sons x 2 and granddaughter at bedside today  Additional family members to come this afternoon and will re-discuss plan for hospice  · Palliative consult for symptom management  · CM consult  · SNF versus home, family to decide

## 2021-09-12 NOTE — CASE MANAGEMENT
A post acute care recommendation was made by your care team for STR  Discussed Freedom of Choice with children  List of facilities reviewed with family that they had selected    Referrals to [facility] via [facility]    DCP is STR with transition to hospice

## 2021-09-12 NOTE — ASSESSMENT & PLAN NOTE
· Patient initially presented to [facility] for intractable nausea and vomiting  Subsequently found to have pancytopenia with fluctuating hemoglobin and thrombocytopenia which is multifactorial in the setting of suspected bone marrow failure and hemolytic anemia  · Patient was evaluated by GI, EGD showed nonobstructing esophageal ring in the lower 3rd of the esophagus as well as hiatal hernia without Braden lesions  No further GI workup recommended  · Workup:  Fibrinogen level has been trending up  Status post 2 units of cryoprecipitate 9/11  D-dimer also trending up  LDH significantly elevated  Total bilirubin has been elevated throughout hospitalization  Hepatitis-B and C panel negative  PTT/INR has been normal   Shama test normal   Reticulocyte count mildly elevated  Peripheral smear did show schistocytes and helmet cells  · CT negative for hypersplenism however did show distended gallbladder for which HIDA scan was done and was negative  · Hemoglobin and platelet count have been stable  · Hematology following, suspect bone marrow suppression or bone marrow failure  Bone marrow biopsy was planned however now holding off given patient/family wishes for hospice  See below

## 2021-09-12 NOTE — PROGRESS NOTES
Hematology/Oncology IP Progress Note    Date of Service: 9/12/2021    [facility]  [address] [place] [id number] [phone]    Marion Brewster is a 80 y o  female at Hospital [facility] ( LOS: 3 days ) admitted on 9/9/2021  9:43 PM     Principal Problem: Pancytopenia ([address] )     Assessment/Plan:   I personally reviewed the lab results, image studies results and other specialties/physicians consult notes and recommendations  I shared the findings with patient and discussed diagnosis and management plan as below  Principal Problem:    Pancytopenia  Active Problems:    History of transcatheter aortic valve replacement (TAVR)    Parkinson's disease     Severe protein-calorie malnutrition    Constipation    Chronic diastolic CHF    Paroxysmal atrial fibrillation     Intractable nausea and vomiting    Hemolytic anemia (HCC)    Goals of care, counseling/discussion      1  Bicytopenia, suspect bone marrow failure  Initially, the bone marrow biopsy was scheduled on Monday  Lab showed a stable pancytopenia  Patient had a meeting with family and primary team   Patient declined bone marrow biopsy  Patient does not want any aggressive treatment if she has cancer  Patient decided going to nursing home with comfort care  2  Chronic hemolytic anemia with negative enoch  Normal iron study  Folate normal   B12 more than 1000  EGD on September 7, 2021 showed no evidence of bleeding  H pylori negative  No evidence of malignancy  Patient has bioprosthetic valve with perivascular leaking which can cause  hemolytic anemia  Repeat lab showed slightly improved total bilirubin  Stable pancytopenia  Will continue to monitor patient  3  Follow-up:  Sign off  Patient can be followed by primary care physician after discharge  Disclaimer: This document was prepared using [employer] Fluency Direct technology   If a word or phrase is confusing, or does not make sense, this is likely due to recognition error which was not discovered during the providers review  If you believe an error has occurred, please Contact me through [employer] service for alis? cation  Subjective:     Patient feels fine  No fever or chills  No exertional chest pain, diaphoresis or shortness  of breath  No cough and phlegm, no hemoptysis  Patient denied nausea  and vomiting  No abdominal pain  No diarrhea or constipation  No  symptoms  No headache or blurred vision  No seizure activity  Appetite good  No significant weight loss or weight gain  Patient declined bone marrow biopsy after discussed with family  No bleeding anywhere else  Objective:   VITALS:   /64   Pulse 69   Temp 98 2 °F (36 8 °C)   Resp 18   Ht 5' 5" (1 651 m) Comment: per records  SpO2 97%   BMI 16 31 kg/m²   Temp (24hrs), Av 2 °F (36 8 °C), Min:98 2 °F (36 8 °C), Max:98 2 °F (36 8 °C)    I&O:    Intake/Output Summary (Last 24 hours) at 2021 1523  Last data filed at 2021 1500  Gross per 24 hour   Intake 300 ml   Output --   Net 300 ml      Weight change:      Physical EXAM:  General:  Cachectic lady, Alert, cooperative, no distress, appears stated age  Head:  Normocephalic, without obvious abnormality, atraumatic  Eyes:  Conjunctivae/corneas clear  PERRL, EOMs intact  No evidence of conjunctivitis     Throat: Lips, mucosa, and tongue normal  No lesions in the oropharynx   Neck: Supple, symmetrical, trachea midline, no adenopathy    Lungs:   Clear to auscultation bilaterally  Respiratory effort easy, nonlabored    Heart:  Regular rate and rhythm, S1, S2 normal, no murmur, click, rub or gallop  Abdomen:   Soft, non-tender,nondistended  Bowel sounds normal  No masses,  No organomegaly  Extremities: Extremities normal, atraumatic, no cyanosis or edema   No axillary or inguinal adenopathy   Skin: Skin color, texture, turgor normal  No rashes or lesions    Neurologic: A&O  No focal deficits         ALLERGIES:  Allergies   Allergen Reactions    Amoxicillin Diarrhea    Ciprofloxacin Vomiting    Erythromycin Diarrhea    Plavix [Clopidogrel] GI Intolerance       CURRENT MEDICATIONS:  Inpatient Scheduled Medications:carbidopa-levodopa, 1 tablet, 5x Daily  metoprolol tartrate, 25 mg, Q12H SWATI  mirtazapine, 7 5 mg, HS  ondansetron, 4 mg, TID AC  pantoprazole, 40 mg, Early Morning      Inpatient PRN Medications:acetaminophen, 650 mg, Q6H PRN  ALPRAZolam, 0 25 mg, TID PRN  bisacodyl, 10 mg, Daily PRN  metoclopramide, 10 mg, Q6H PRN  polyethylene glycol, 17 g, Daily PRN  saliva substitute, 5 spray, 4x Daily PRN  trimethobenzamide, 200 mg, Q8H PRN      Inpatient IV Infusions:     DATA:    Pathology Result:    Final Diagnosis   Date Value Ref Range Status   09/07/2021   Final    A  Duodenum, retrieved by cold biopsy forceps:  - Fragments of duodenal mucosa with features suggestive of peptic duodenitis  - There is no blunting of the intestinal villi or increased number of intraepithelial lymphocytes to suggest the presence of gluten sensitive enteropathy  B  Stomach, retrieved by cold biopsy forceps:  - Fragments of gastric antral mucosa with morphologic features compatible with reactive gastropathy   - Gastric oxyntic mucosa with mild chronic nonspecific inflammation   - No Helicobacter pylori organisms are identified with routine H&E stain  Image Results: They are reviewed and documented in Hematology/Oncology history    NM hepatobiliary  Narrative: HEPATOBILIARY SCAN    INDICATION: Nausea/vomiting  see above    COMPARISON:  Ultrasound abdomen 9/3/2021, CT chest abdomen pelvis 9/8/2021    TECHNIQUE:   Following the intravenous administration of 5 05 mCi Tc-99m labeled mebrofenin, dynamic abdominal imaging was obtained in the anterior projection over a 60 minute time period  80 minute delayed static image acquired      FINDINGS:     There is prompt, uniform accumulation with normal clearance of the radiopharmaceutical by the liver  There is normal filling of the intrahepatic hepatic biliary ducts  Some retention of radiotracer noted in the region of the common bile duct and common   hepatic duct  CBD noted to be within normal limits on recent imaging  Gallbladder is seen filling within the 1st hour and more fully seen at 80 minutes where it is markedly enlarged as seen on recent CT  Impression: 1  Gallbladder filling compatible with a patent cystic duct  The gallbladder is noted to be markedly enlarged as seen on CT  Workstation performed: [id number]        LABS:  Lab data are reviewed and documented in HemOnc history       Recent Results (from the past 48 hour(s))   CBC and differential    Collection Time: 09/11/21  5:41 AM   Result Value Ref Range    WBC 2 79 (L) 4 31 - 10 16 Thousand/uL    RBC 2 60 (L) 3 81 - 5 12 Million/uL    Hemoglobin 7 8 (L) 11 5 - 15 4 g/dL    Hematocrit 24 6 (L) 34 8 - 46 1 %    MCV 95 82 - 98 fL    MCH 30 0 26 8 - 34 3 pg    MCHC 31 7 31 4 - 37 4 g/dL    RDW 20 4 (H) 11 6 - 15 1 %    Platelets 75 (L) 878 - 390 Thousands/uL    nRBC 0 /100 WBCs   Comprehensive metabolic panel    Collection Time: 09/11/21  5:41 AM   Result Value Ref Range    Sodium 137 136 - 145 mmol/L    Potassium 3 4 (L) 3 5 - 5 3 mmol/L    Chloride 101 100 - 108 mmol/L    CO2 31 21 - 32 mmol/L    ANION GAP 5 4 - 13 mmol/L    BUN 18 5 - 25 mg/dL    Creatinine 0 50 (L) 0 60 - 1 30 mg/dL    Glucose 65 65 - 140 mg/dL    Calcium 8 2 (L) 8 3 - 10 1 mg/dL    Corrected Calcium 9 0 8 3 - 10 1 mg/dL    AST 49 (H) 5 - 45 U/L    ALT 10 (L) 12 - 78 U/L    Alkaline Phosphatase 130 (H) 46 - 116 U/L    Total Protein 5 5 (L) 6 4 - 8 2 g/dL    Albumin 3 0 (L) 3 5 - 5 0 g/dL    Total Bilirubin 1 68 (H) 0 20 - 1 00 mg/dL    eGFR 88 ml/min/1 73sq m   CBC and differential    Collection Time: 09/12/21  6:38 AM   Result Value Ref Range    WBC 2 73 (L) 4 31 - 10 16 Thousand/uL RBC 2 41 (L) 3 81 - 5 12 Million/uL    Hemoglobin 7 2 (L) 11 5 - 15 4 g/dL    Hematocrit 22 7 (L) 34 8 - 46 1 %    MCV 94 82 - 98 fL    MCH 29 9 26 8 - 34 3 pg    MCHC 31 7 31 4 - 37 4 g/dL    RDW 20 1 (H) 11 6 - 15 1 %    Platelets 75 (L) 474 - 390 Thousands/uL    nRBC 0 /100 WBCs    Neutrophils Relative 78 (H) 43 - 75 %    Immat GRANS % 1 0 - 2 %    Lymphocytes Relative 8 (L) 14 - 44 %    Monocytes Relative 10 4 - 12 %    Eosinophils Relative 2 0 - 6 %    Basophils Relative 1 0 - 1 %    Neutrophils Absolute 2 14 1 85 - 7 62 Thousands/µL    Immature Grans Absolute 0 02 0 00 - 0 20 Thousand/uL    Lymphocytes Absolute 0 23 (L) 0 60 - 4 47 Thousands/µL    Monocytes Absolute 0 28 0 17 - 1 22 Thousand/µL    Eosinophils Absolute 0 04 0 00 - 0 61 Thousand/µL    Basophils Absolute 0 02 0 00 - 0 10 Thousands/µL   Comprehensive metabolic panel    Collection Time: 09/12/21  6:38 AM   Result Value Ref Range    Sodium 136 136 - 145 mmol/L    Potassium 3 5 3 5 - 5 3 mmol/L    Chloride 103 100 - 108 mmol/L    CO2 28 21 - 32 mmol/L    ANION GAP 5 4 - 13 mmol/L    BUN 16 5 - 25 mg/dL    Creatinine 0 46 (L) 0 60 - 1 30 mg/dL    Glucose 57 (L) 65 - 140 mg/dL    Calcium 8 0 (L) 8 3 - 10 1 mg/dL    Corrected Calcium 9 0 8 3 - 10 1 mg/dL    AST 50 (H) 5 - 45 U/L    ALT 8 (L) 12 - 78 U/L    Alkaline Phosphatase 115 46 - 116 U/L    Total Protein 5 2 (L) 6 4 - 8 2 g/dL    Albumin 2 8 (L) 3 5 - 5 0 g/dL    Total Bilirubin 1 52 (H) 0 20 - 1 00 mg/dL    eGFR 90 ml/min/1 73sq m   Manual Differential(PHLEBS Do Not Order)    Collection Time: 09/12/21  6:38 AM   Result Value Ref Range    Total Counted      RBC Morphology Present     Anisocytosis Present     Poikilocytes Present     Schistocytes Present     Platelet Estimate Decreased (A) Adequate       Keren Anne MD  9/12/2021, 3:23 PM

## 2021-09-12 NOTE — PROGRESS NOTES
[facility]  Progress Note Holly Machado 1934, 80 y o  female MRN: [id number]  [facility] Encounter: [id number]  Primary Care Provider: Andry Gonzales   Date and time admitted to hospital: 9/9/2021  9:43 PM    * Pancytopenia  Assessment & Plan  · Patient initially presented to [facility] for intractable nausea and vomiting  Subsequently found to have pancytopenia with fluctuating hemoglobin and thrombocytopenia which is multifactorial in the setting of suspected bone marrow failure and hemolytic anemia  · Patient was evaluated by GI, EGD showed nonobstructing esophageal ring in the lower 3rd of the esophagus as well as hiatal hernia without Braden lesions  No further GI workup recommended  · Workup:  Fibrinogen level has been trending up  Status post 2 units of cryoprecipitate 9/11  D-dimer also trending up  LDH significantly elevated  Total bilirubin has been elevated throughout hospitalization  Hepatitis-B and C panel negative  PTT/INR has been normal   Shama test normal   Reticulocyte count mildly elevated  Peripheral smear did show schistocytes and helmet cells  · CT negative for hypersplenism however did show distended gallbladder for which HIDA scan was done and was negative  · Hemoglobin and platelet count have been stable  · Hematology following, suspect bone marrow suppression or bone marrow failure  Bone marrow biopsy was planned however now holding off given patient/family wishes for hospice  See below  Hemolytic anemia (HCC)  Assessment & Plan  · Evaluated by hematology  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, 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  · Hemoglobin and platelet count relatively stable, no further lab draws       Intractable nausea and vomiting  Assessment & Plan  · Patient initially presented with intractable nausea and vomiting; ultrasound right upper quadrant demonstrated gallbladder sludge  · CT abdomen showing distended gallbladder with mild surrounding inflammatory changes  · HIDA scan and showing patent cystic duct  · Prior to transfer, general surgery consulted and feel less likely acute cholecystitis  · GI performed EGD showing large sliding hiatal hernia without Braden lesions and nonobstructing esophageal ring in the lower 3rd of the esophagus  · Patient reports ongoing nausea and overall poor appetite  Only able to take spoonfuls of food  Already on Remeron  Continue zofran before meals  Patient also requesting to hold safinamide as she believes this is contributing  If no improvement, will re-involve GI  · Palliative care consult for symptom management given wishes for transition to hospice  Goals of care, counseling/discussion  Assessment & Plan  · Patient expresses wishes for hospice cares  Does not want BM biopsy as would not   · Does not want feeding tube or alternative means of nutrition  · Discussed with sons x 2 and granddaughter at bedside today  Additional family members to come this afternoon and will re-discuss plan for hospice  · Palliative consult for symptom management  · CM consult  · SNF versus home, family to decide  Constipation  Assessment & Plan  · Continue bowel regimen    Parkinson's disease   Assessment & Plan  · Continue home dose of Sinemet  Patient requesting to hold Safinamide tablets as she believes this is what is contributing to her nausea and metallic taste  Paroxysmal atrial fibrillation   Assessment & Plan  · Continue Lopressor  · Not on anticoagulation secondary to anemia and now with plans for hospice cares       Severe protein-calorie malnutrition  Assessment & Plan  Malnutrition Findings:   Adult Malnutrition type: Acute illness (related to altered GI function; lack of appetite; at risk for skin breakdown)  Adult Degree of Malnutrition: Other severe protein calorie malnutrition (energy intake meeting < 50% needs for > 5 days; 8 4% wt loss x1 month; severe fat loss to buccal pads and orbitals, severe muscle loss to temporals, clavicals; treated with PO diet and nturtion supplements)    BMI Findings:  Adult BMI Classifications: Underweight < 18 5 BMI 16 31  · Diet/supplements as tolerated  · Patient clear she does not want feeding tube or artificial means of nutrition  Plan for hospice  History of transcatheter aortic valve replacement (TAVR)  Assessment & Plan  · Underwent TAVR placement on 07/2021  Does have perivascular leaking which per hematology can cause hemolytic anemia  · 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  · Currently off Plavix as per Cardiology for intolerance  Maintained on ASA however holding given anemia  Chronic diastolic CHF  Assessment & Plan  Wt Readings from Last 3 Encounters:   09/07/21 44 5 kg (98 lb)   08/11/21 48 4 kg (106 lb 12 8 oz)     · Patient noted to have echo on June 2021 showing EF of 60 65%  · Currently appears euvolemic  · Monitor volume status          VTE Pharmacologic Prophylaxis:   Moderate Risk (Score 3-4) - Pharmacological DVT Prophylaxis Contraindicated  Sequential Compression Devices Ordered  Patient Centered Rounds: I performed bedside rounds with nursing staff today  Discussions with Specialists or Other Care Team Provider: nursing, case management     Education and Discussions with Family / Patient: Updated  (son x 2, granddaughter and will d/w additional family members this afternoon) at bedside  Time Spent for Care: 45 minutes  More than 50% of total time spent on counseling and coordination of care as described above      Current Length of Stay: 3 day(s)  Current Patient Status: Inpatient   Certification Statement: The patient will continue to require additional inpatient hospital stay due to symptom management, palliative consult, discharge plan with hospice SNF versus home  Discharge Plan: Anticipate discharge in 24-48 hrs to depending on discharge plan and symptom management    Code Status: Level 3 - DNAR and DNI    Subjective:   Patient continues to complain of intermittent nausea and little to no oral intake  She does not wish to proceed with BM biopsy  She expressed wishes to be "comfortable " She does not want alternative means of nutrition/PEG tube  She states she has lived a long happy life and she no longer wishes to be hospitalized or undergo any further workup  Objective:     Vitals:   Temp (24hrs), Av 2 °F (36 8 °C), Min:98 2 °F (36 8 °C), Max:98 2 °F (36 8 °C)    Temp:  [98 2 °F (36 8 °C)] 98 2 °F (36 8 °C)  HR:  [69] 69  Resp:  [18] 18  BP: (115-117)/(57-64) 115/64  SpO2:  [97 %] 97 %  Body mass index is 16 31 kg/m²  Input and Output Summary (last 24 hours): Intake/Output Summary (Last 24 hours) at 2021 1118  Last data filed at 2021 1859  Gross per 24 hour   Intake 0 ml   Output --   Net 0 ml       Physical Exam:   Physical Exam  Vitals and nursing note reviewed  Constitutional:       General: She is not in acute distress  Appearance: She is cachectic  Comments: Appears weak and frail   Cardiovascular:      Rate and Rhythm: Normal rate  Pulmonary:      Breath sounds: Decreased breath sounds present  Abdominal:      Tenderness: There is no abdominal tenderness  Musculoskeletal:         General: No swelling  Comments: kyphosis    Skin:     General: Skin is warm  Coloration: Skin is pale  Neurological:      Mental Status: She is alert and oriented to person, place, and time  Mental status is at baseline     Psychiatric:         Mood and Affect: Mood normal           Additional Data:     Labs:  Results from last 7 days   Lab Units 21  0638 21  0513   WBC Thousand/uL 2 73* 3 24*   HEMOGLOBIN g/dL 7 2* 7 7*   HEMATOCRIT % 22 7* 23 5*   PLATELETS Thousands/uL 75* 64*   BANDS PCT %  --  1   NEUTROS PCT % 78*  --    LYMPHS PCT % 8* --    LYMPHO PCT %  --  16   MONOS PCT % 10  --    MONO PCT %  --  2*   EOS PCT % 2 1     Results from last 7 days   Lab Units 09/12/21  0638   SODIUM mmol/L 136   POTASSIUM mmol/L 3 5   CHLORIDE mmol/L 103   CO2 mmol/L 28   BUN mg/dL 16   CREATININE mg/dL 0 46*   ANION GAP mmol/L 5   CALCIUM mg/dL 8 0*   ALBUMIN g/dL 2 8*   TOTAL BILIRUBIN mg/dL 1 52*   ALK PHOS U/L 115   ALT U/L 8*   AST U/L 50*   GLUCOSE RANDOM mg/dL 57*     Results from last 7 days   Lab Units 09/09/21  0513   INR  1 10     Results from last 7 days   Lab Units 09/07/21  0753 09/07/21  0717   POC GLUCOSE mg/dl 131 61*               Lines/Drains:  Invasive Devices     Peripheral Intravenous Line            Peripheral IV 09/11/21 Left;Ventral (anterior) Forearm <1 day                      Imaging: No pertinent imaging reviewed  Recent Cultures (last 7 days):         Last 24 Hours Medication List:   Current Facility-Administered Medications   Medication Dose Route Frequency Provider Last Rate    acetaminophen  650 mg Oral Q6H PRN Wilton Roberts MD      ALPRAZolam  0 25 mg Oral TID PRN Wilton Roberts MD      bisacodyl  10 mg Rectal Daily PRN JADA Aguirre      carbidopa-levodopa  1 tablet Oral 5x Daily Wilton Roberts MD      metoclopramide  10 mg Intravenous Q6H PRN Wilton Roberts MD      metoprolol tartrate  25 mg Oral Q12H [address] Wilton Roberts MD      mirtazapine  7 5 mg Oral HS Wilton Roberts MD      ondansetron  4 mg Intravenous TID AC JADA Hines      pantoprazole  40 mg Oral Early Morning Wilton Roberts MD      polyethylene glycol  17 g Oral Daily PRN JADA Aguirre      saliva substitute  5 spray Mouth/Throat 4x Daily PRN Wilton Roberts MD      trimethobenzamide  200 mg Intramuscular Q8H PRN Wilton Roberts MD          Today, Patient Was Seen By: JADA Garcia    **Please Note: This note may have been constructed using a voice recognition system  **

## 2021-09-12 NOTE — ASSESSMENT & PLAN NOTE
· Continue home dose of Sinemet  Patient requesting to hold Safinamide tablets as she believes this is what is contributing to her nausea and metallic taste

## 2021-09-12 NOTE — ASSESSMENT & PLAN NOTE
Wt Readings from Last 3 Encounters:   09/07/21 44 5 kg (98 lb)   08/11/21 48 4 kg (106 lb 12 8 oz)     · Patient noted to have echo on June 2021 showing EF of 60 65%  · Currently appears euvolemic  · Monitor volume status

## 2021-09-12 NOTE — ASSESSMENT & PLAN NOTE
· Patient initially presented with intractable nausea and vomiting; ultrasound right upper quadrant demonstrated gallbladder sludge  · CT abdomen showing distended gallbladder with mild surrounding inflammatory changes  · HIDA scan and showing patent cystic duct  · Prior to transfer, general surgery consulted and feel less likely acute cholecystitis  · GI performed EGD showing large sliding hiatal hernia without Braden lesions and nonobstructing esophageal ring in the lower 3rd of the esophagus  · Patient reports ongoing nausea and overall poor appetite  Only able to take spoonfuls of food  Already on Remeron  Continue zofran before meals  Patient also requesting to hold safinamide as she believes this is contributing  If no improvement, will re-involve GI  · Palliative care consult for symptom management given wishes for transition to hospice

## 2021-09-13 LAB — PATHOLOGIST INTERPRETATION: NORMAL

## 2021-09-13 PROCEDURE — 99232 SBSQ HOSP IP/OBS MODERATE 35: CPT | Performed by: INTERNAL MEDICINE

## 2021-09-13 PROCEDURE — 99232 SBSQ HOSP IP/OBS MODERATE 35: CPT | Performed by: NURSE PRACTITIONER

## 2021-09-13 RX ORDER — LORAZEPAM 2 MG/ML
0.25 INJECTION INTRAMUSCULAR EVERY 8 HOURS PRN
Status: DISCONTINUED | OUTPATIENT
Start: 2021-09-13 | End: 2021-09-13

## 2021-09-13 RX ORDER — LORAZEPAM 2 MG/ML
0.25 INJECTION INTRAMUSCULAR EVERY 8 HOURS PRN
Status: DISCONTINUED | OUTPATIENT
Start: 2021-09-13 | End: 2021-09-14 | Stop reason: HOSPADM

## 2021-09-13 RX ORDER — LORAZEPAM 0.5 MG/1
0.25 TABLET ORAL 3 TIMES DAILY
Status: DISCONTINUED | OUTPATIENT
Start: 2021-09-13 | End: 2021-09-14 | Stop reason: HOSPADM

## 2021-09-13 RX ADMIN — CARBIDOPA AND LEVODOPA 1 TABLET: 50; 200 TABLET, EXTENDED RELEASE ORAL at 05:45

## 2021-09-13 RX ADMIN — PANTOPRAZOLE SODIUM 40 MG: 20 TABLET, DELAYED RELEASE ORAL at 05:45

## 2021-09-13 RX ADMIN — CARBIDOPA AND LEVODOPA 1 TABLET: 50; 200 TABLET, EXTENDED RELEASE ORAL at 16:04

## 2021-09-13 RX ADMIN — ALPRAZOLAM 0.25 MG: 0.25 TABLET ORAL at 02:11

## 2021-09-13 RX ADMIN — METOPROLOL TARTRATE 25 MG: 25 TABLET, FILM COATED ORAL at 21:00

## 2021-09-13 RX ADMIN — ONDANSETRON 4 MG: 2 INJECTION INTRAMUSCULAR; INTRAVENOUS at 16:01

## 2021-09-13 RX ADMIN — ONDANSETRON 4 MG: 2 INJECTION INTRAMUSCULAR; INTRAVENOUS at 05:45

## 2021-09-13 RX ADMIN — LORAZEPAM 0.25 MG: 0.5 TABLET ORAL at 21:00

## 2021-09-13 RX ADMIN — MIRTAZAPINE 7.5 MG: 15 TABLET, FILM COATED ORAL at 21:00

## 2021-09-13 RX ADMIN — CARBIDOPA AND LEVODOPA 1 TABLET: 50; 200 TABLET, EXTENDED RELEASE ORAL at 21:00

## 2021-09-13 RX ADMIN — METOPROLOL TARTRATE 25 MG: 25 TABLET, FILM COATED ORAL at 09:07

## 2021-09-13 RX ADMIN — LORAZEPAM 0.25 MG: 0.5 TABLET ORAL at 16:01

## 2021-09-13 RX ADMIN — CARBIDOPA AND LEVODOPA 1 TABLET: 50; 200 TABLET, EXTENDED RELEASE ORAL at 10:31

## 2021-09-13 RX ADMIN — ONDANSETRON 4 MG: 2 INJECTION INTRAMUSCULAR; INTRAVENOUS at 10:31

## 2021-09-13 NOTE — CASE MANAGEMENT
Referrals were placed  Awaiting for accepting faciltiy  1215 update: TC to debbie Roberts [phone]  Updated no accepting facility at this time  He states family has been looking into 24/7 care now & to possibly bring pt home on hospice  He will update CM with plan  [phone] TC from debbie Patel  States he s/w family & they want pt home  They are looking for 24 /7 care & will update CM in am with outcome  They will consider pt to go to rehab if they cannot find 24/7 care  Plan for now is home with 24/7 care & home hospice  Referrals placed to hospice agencies near pt's home

## 2021-09-13 NOTE — CASE MANAGEMENT
TC from Earl Khan at [address] [place] [address] & they can accept pt  Aware family is looking into 24/7 care  CM to call her back if family does indeed take pt home to set up services  Contact, Leticia Giron [phone]  [facility] for son Tika Palmer [phone] to update them on the above if they decide to use this agency

## 2021-09-13 NOTE — ASSESSMENT & PLAN NOTE
· Patient initially presented to [facility] for intractable nausea and vomiting  Subsequently found to have pancytopenia with fluctuating hemoglobin and thrombocytopenia which is multifactorial in the setting of suspected bone marrow failure and hemolytic anemia  · Patient was evaluated by GI, EGD showed nonobstructing esophageal ring in the lower 3rd of the esophagus as well as hiatal hernia without Braden lesions  No further GI workup recommended  · Workup:  Fibrinogen level has been trending up  Status post 2 units of cryoprecipitate 9/11  D-dimer also trending up  LDH significantly elevated  Total bilirubin has been elevated throughout hospitalization  Hepatitis-B and C panel negative  PTT/INR has been normal   Shama test normal   Reticulocyte count mildly elevated  Peripheral smear did show schistocytes and helmet cells  · CT negative for hypersplenism however did show distended gallbladder for which HIDA scan was done and was negative  · Hemoglobin and platelet count have been stable  · Hematology following, suspect bone marrow suppression or bone marrow failure  Bone marrow biopsy was planned however now holding off given patient/family wishes for hospice  See below

## 2021-09-13 NOTE — PROGRESS NOTES
[address] [facility]  Progress Note Laura sEcalante 1934, 80 y o  female MRN: [id number]  Unit/Bed#: [facility] 913-01 Encounter: [id number]  Primary Care Provider: Lj Capellan   Date and time admitted to hospital: 9/9/2021  9:43 PM    * Pancytopenia  Assessment & Plan  · Patient initially presented to [facility] for intractable nausea and vomiting  Subsequently found to have pancytopenia with fluctuating hemoglobin and thrombocytopenia which is multifactorial in the setting of suspected bone marrow failure and hemolytic anemia  · Patient was evaluated by GI, EGD showed nonobstructing esophageal ring in the lower 3rd of the esophagus as well as hiatal hernia without Braden lesions  No further GI workup recommended  · Workup:  Fibrinogen level has been trending up  Status post 2 units of cryoprecipitate 9/11  D-dimer also trending up  LDH significantly elevated  Total bilirubin has been elevated throughout hospitalization  Hepatitis-B and C panel negative  PTT/INR has been normal   Shama test normal   Reticulocyte count mildly elevated  Peripheral smear did show schistocytes and helmet cells  · CT negative for hypersplenism however did show distended gallbladder for which HIDA scan was done and was negative  · Hemoglobin and platelet count have been stable  · Hematology following, suspect bone marrow suppression or bone marrow failure  Bone marrow biopsy was planned however now holding off given patient/family wishes for hospice  See below  Goals of care, counseling/discussion  Assessment & Plan  · Patient expresses wishes for hospice cares  Does not want BM biopsy as would not   · Does not want feeding tube or alternative means of nutrition  · Discussed with sons x 2 and granddaughter at bedside today  Additional family members to come this afternoon and will re-discuss plan for hospice     · Palliative consult for symptom management  · CM consult  · SNF versus home, family to decide  Hemolytic anemia (HCC)  Assessment & Plan  · Evaluated by hematology  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, 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  · Hemoglobin and platelet count relatively stable, no further lab draws  Intractable nausea and vomiting  Assessment & Plan  · Patient initially presented with intractable nausea and vomiting; ultrasound right upper quadrant demonstrated gallbladder sludge  · CT abdomen showing distended gallbladder with mild surrounding inflammatory changes  · HIDA scan and showing patent cystic duct  · Prior to transfer, general surgery consulted and feel less likely acute cholecystitis  · GI performed EGD showing large sliding hiatal hernia without Braden lesions and nonobstructing esophageal ring in the lower 3rd of the esophagus  · Patient reports ongoing nausea and overall poor appetite  Only able to take spoonfuls of food  Already on Remeron  Continue zofran before meals  Patient also requesting to hold safinamide as she believes this is contributing  If no improvement, no need to escalate care   · Palliative care consult for symptom management given wishes for transition to hospice  · - seen this am however pt was sleeping left to sleep per palliative  · - However on my visit pt reports vomiting yellow fluids last night (suspect bile) - as pt not eating   · - she is agreeable to ice chips and ice chips in water with mouth swab  · - discussed this episode with palliative dr Barbra Calderon , see palliative note with addition of ativan for anxiety some medication adjustements made     Paroxysmal atrial fibrillation   Assessment & Plan  · Continue Lopressor  · Not on anticoagulation secondary to anemia and now with plans for hospice cares       Chronic diastolic CHF  Assessment & Plan  Wt Readings from Last 3 Encounters:   09/07/21 44 5 kg (98 lb)   08/11/21 48 4 kg (106 lb 12 8 oz)     · Patient noted to have echo on June 2021 showing EF of 60 65%  · Currently appears euvolemic  · Monitor volume status        Constipation  Assessment & Plan  · Continue bowel regimen    Severe protein-calorie malnutrition  Assessment & Plan  Malnutrition Findings:   Adult Malnutrition type: Acute illness (related to altered GI function; lack of appetite; at risk for skin breakdown)  Adult Degree of Malnutrition: Other severe protein calorie malnutrition (energy intake meeting < 50% needs for > 5 days; 8 4% wt loss x1 month; severe fat loss to buccal pads and orbitals, severe muscle loss to temporals, clavicals; treated with PO diet and nturtion supplements)    BMI Findings:  Adult BMI Classifications: Underweight < 18 5 BMI 16 31  · Diet/supplements as tolerated  · Patient clear she does not want feeding tube or artificial means of nutrition  Plan for hospice  · Pt not eating rather prefers to have ice chips and suck on them as well as sponge for her mouth to rinse it out   · She reports episode of yellow emesis last night     Parkinson's disease   Assessment & Plan  · Continue home dose of Sinemet  Patient requesting to hold Safinamide tablets as she believes this is what is contributing to her nausea and metallic taste  History of transcatheter aortic valve replacement (TAVR)  Assessment & Plan  · Underwent TAVR placement on 07/2021  Does have perivascular leaking which per hematology can cause hemolytic anemia  · 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  · Currently off Plavix as per Cardiology for intolerance  Maintained on ASA however holding given anemia  VTE Pharmacologic Prophylaxis:   High Risk (Score >/= 5) - Pharmacological DVT Prophylaxis Contraindicated  Sequential Compression Devices Ordered  level four comfort care    Patient Centered Rounds: I performed bedside rounds with nursing staff today    Discussions with Specialists or Other Care Team Provider: nursing casemgmt and palliative care     Education and Discussions with Family / Patient: Attempted to update  (son) via phone  Unable to contact  leonardo/kvng left message  On machine will attempt to reach a little later     Time Spent for Care: 45 minutes  More than 50% of total time spent on counseling and coordination of care as described above  Current Length of Stay: 4 day(s)  Current Patient Status: Inpatient   Certification Statement: The patient will continue to require additional inpatient hospital stay due to pending final plan of care   Discharge Plan: Anticipate discharge in 24-48 hrs to unclear facility vs home with hospice care     Code Status: Level 4 - Comfort Care    Subjective:   Pt lying in bed asleep easily arousable  Patient reports that last night she was sitting in a chair and began to have CMP and chest pain she reports calling for system and eventually returned back to bed, also reports having an episode of large amount of yellow liquid in the emesis  She denies any extreme pain and reports she has absolutely no appetite  Her mouth is dry the ice chips were provided and mouth was provided by myself to the patient's bedside  Discussed with patient and nursing that they could call me if patient's symptoms increased   Objective:     Vitals:   Temp (24hrs), Av 6 °F (37 °C), Min:98 6 °F (37 °C), Max:98 6 °F (37 °C)    Temp:  [98 6 °F (37 °C)] 98 6 °F (37 °C)  HR:  [60-69] 69  Resp:  [18] 18  BP: (113-115)/(61-63) 113/61  SpO2:  [96 %] 96 %  Body mass index is 16 31 kg/m²  Input and Output Summary (last 24 hours): Intake/Output Summary (Last 24 hours) at 2021 1521  Last data filed at 2021 0300  Gross per 24 hour   Intake --   Output 150 ml   Net -150 ml       Physical Exam:   Physical Exam  Constitutional:       General: She is not in acute distress  Appearance: She is ill-appearing  She is not toxic-appearing or diaphoretic     HENT:      Head: Normocephalic and atraumatic  Mouth/Throat:      Pharynx: Oropharynx is clear  Eyes:      General:         Right eye: No discharge  Left eye: No discharge  Conjunctiva/sclera: Conjunctivae normal    Cardiovascular:      Rate and Rhythm: Normal rate  Heart sounds: No murmur heard  No friction rub  No gallop  Pulmonary:      Effort: No respiratory distress  Breath sounds: No stridor  No wheezing, rhonchi or rales  Comments: Poor effort   Chest:      Chest wall: No tenderness  Abdominal:      General: Abdomen is flat  There is no distension  Palpations: There is no mass  Tenderness: There is no abdominal tenderness  There is no right CVA tenderness, left CVA tenderness, guarding or rebound  Hernia: No hernia is present  Musculoskeletal:         General: No swelling, tenderness, deformity or signs of injury  Right lower leg: No edema  Left lower leg: No edema  Skin:     Coloration: Skin is not jaundiced or pale  Findings: No bruising, erythema, lesion or rash  Neurological:      Mental Status: She is alert and oriented to person, place, and time     Psychiatric:      Comments: Sad           Additional Data:     Labs:  Results from last 7 days   Lab Units 09/12/21  0638 09/09/21  0513   WBC Thousand/uL 2 73* 3 24*   HEMOGLOBIN g/dL 7 2* 7 7*   HEMATOCRIT % 22 7* 23 5*   PLATELETS Thousands/uL 75* 64*   BANDS PCT %  --  1   NEUTROS PCT % 78*  --    LYMPHS PCT % 8*  --    LYMPHO PCT %  --  16   MONOS PCT % 10  --    MONO PCT %  --  2*   EOS PCT % 2 1     Results from last 7 days   Lab Units 09/12/21  0638   SODIUM mmol/L 136   POTASSIUM mmol/L 3 5   CHLORIDE mmol/L 103   CO2 mmol/L 28   BUN mg/dL 16   CREATININE mg/dL 0 46*   ANION GAP mmol/L 5   CALCIUM mg/dL 8 0*   ALBUMIN g/dL 2 8*   TOTAL BILIRUBIN mg/dL 1 52*   ALK PHOS U/L 115   ALT U/L 8*   AST U/L 50*   GLUCOSE RANDOM mg/dL 57*     Results from last 7 days   Lab Units 09/09/21  0513   INR 1 10     Results from last 7 days   Lab Units 09/07/21  0753 09/07/21  0717   POC GLUCOSE mg/dl 131 61*               Lines/Drains:  Invasive Devices     Peripheral Intravenous Line            Peripheral IV 09/11/21 Left;Ventral (anterior) Forearm 1 day                      Imaging: Reviewed radiology reports from this admission including: all imaging reviewed from admission     Recent Cultures (last 7 days):         Last 24 Hours Medication List:   Current Facility-Administered Medications   Medication Dose Route Frequency Provider Last Rate    acetaminophen  650 mg Oral Q6H PRN Gaona MD Kenyon      bisacodyl  10 mg Rectal Daily PRN Lisy Noble Bile, CRNP      carbidopa-levodopa  1 tablet Oral 5x Daily Gaona MD Kenyon      LORazepam  0 25 mg Intravenous Q8H PRN eMlida Pena MD      LORazepam  0 25 mg Oral TID Melida Pena MD      metoclopramide  10 mg Intravenous Q6H PRN Gaona MD Kenyon      metoprolol tartrate  25 mg Oral Q12H [address] Gaona MD Kenyon      mirtazapine  7 5 mg Oral HS Gaona MD Kenyon      ondansetron  4 mg Intravenous TID AC Lisy M Randal, JANINENP      pantoprazole  40 mg Oral Early Morning Gaona MD Kenyon      polyethylene glycol  17 g Oral Daily PRN Lisy Noble Bile, CRNP      saliva substitute  5 spray Mouth/Throat 4x Daily PRN Candelaria Prescott MD      trimethobenzamide  200 mg Intramuscular Q8H PRN Melida Pena MD          Today, Patient Was Seen By: Tyler Soulier, CRNP    **Please Note: This note may have been constructed using a voice recognition system  **

## 2021-09-13 NOTE — ASSESSMENT & PLAN NOTE
Malnutrition Findings:   Adult Malnutrition type: Acute illness (related to altered GI function; lack of appetite; at risk for skin breakdown)  Adult Degree of Malnutrition: Other severe protein calorie malnutrition (energy intake meeting < 50% needs for > 5 days; 8 4% wt loss x1 month; severe fat loss to buccal pads and orbitals, severe muscle loss to temporals, clavicals; treated with PO diet and nturtion supplements)    BMI Findings:  Adult BMI Classifications: Underweight < 18 5 BMI 16 31  · Diet/supplements as tolerated  · Patient clear she does not want feeding tube or artificial means of nutrition  Plan for hospice     · Pt not eating rather prefers to have ice chips and suck on them as well as sponge for her mouth to rinse it out   · She reports episode of yellow emesis last night

## 2021-09-13 NOTE — CONSULTS
Consultation - Palliative & Supportive Care   Pinky Foy  80 y o   female  PPHP [id number]/PPHP [id number]   MRN: [id number]  Encounter: [id number]    ASSESSMENT:    Patient Active Problem List   Diagnosis    Acute on chronic anemia    History of transcatheter aortic valve replacement (TAVR)    Hyponatremia    Acute cystitis without hematuria    Parkinson's disease     Hiatal hernia    Mild protein-calorie malnutrition (HCC)    Thrombocytopenia (HCC)    Severe protein-calorie malnutrition    Constipation    New onset atrial fibrillation (HCC)    Chronic diastolic CHF    Paroxysmal atrial fibrillation     Intractable nausea and vomiting    Pancytopenia    Hemolytic anemia (HCC)    Acute cholecystitis    Gallbladder sludge    Goals of care, counseling/discussion     Active issues specifically addressed today include:   Parkinson's disease, pancytopenia, nausea + vomiting, constipation, severe protein calorie malnutrition    86F w/ Parkinson's disease, chronic anemia, severe protein calorie malnutrition transferred to [facility] from [facility] 9/9/21 for HemeOnc evaluation of pancytopenia  [facility] consulted for symptom management (nausea + vomiting) as patient has requested comfort care / hospice  PLAN:    1  Goals:    Patient currently Sedrick [place] / comfort care, hospice referral placed, w/ anticipated discharge to SNF for home hospice   Per [facility] note 9/12/21, "Patient continues to express wishes for hospice/comfort care     does not want to proceed with bone marrow biopsy or feeding tube placement     family now all in agreement"  2  Social Support:   Advocated for patient/family with interdisciplinary team    3   Symptom management:   Changing current multi-tier antiemetic regimen:  o Continue Zofran 4mg IV TID AC (1st tier, scheduled)  o Continue Reglan 10mg IV q6h PRN N/V not improving w/ Zofran (2nd tier)  o Start lorazepam 0 25mg PO q8h ATC, hold for somnolence / lethargy / or if patient sleeping; start lorazepam 0 25mg IV q8h PRN breakthrough N/V not improving w/ prior 2 antiemetics (3rd tier)  - Likely significant component of her symptomatology is related to anxiousness  - May need to increase lorazepam dosing in coming hours/days  o Tigan 200mg IM q8h PRN N/V (now 4th tier)  o Escalation sequence is Zofran TID AC -> Reglan IV PRN -> lorazepam IV PRN -> Tigan IM PRN   Stop Xanax (switching to lorazepam ATC + PRN)   No reported pain (other than chest pain earlier today); will       Code status: Level 4 - Comfort Care   Advance Directive / Living Will / POLST:  Nothing in EMR  I have reviewed the patient's controlled substance dispensing history in the Prescription Drug Monitoring Program in compliance with the [facility] regulations before prescribing any controlled substances  We appreciate the opportunity to participate in this patient's care  We will continue to follow  Please do not hesitate to contact our on-call provider through our clinic answering service at [phone] should you have acute symptom control concerns  IDENTIFICATION:  Inpatient consult to Palliative Care  Consult performed by: Germán Khanna MD  Consult ordered by: Xi Antonio      Reason for Consult / Principal Problem: symptom management as goals are clear    HISTORY OF PRESENT ILLNESS:    Quang Yu is a 80 y o  female with Parkinson's disease, chronic anemia, severe protein calorie malnutrition w/ BMI < 17 HFpEF, h/o TAVR in 57/5404 w/ 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  She had c/o ongoing N+V since her TAVR  Transferred to [facility] from [facility]  9/9/21 for HemeOnc evaluation of pancytopenia  Primary team at [facility] initiated multi-tier antiemetic regimen  Per primary team, they woke the patient earlier today and she became anxious and agitated, c/o chest pain "worried about a heart attack, like he  had" and vomited bilious fluid      Interview and exam limited by: patient sleeping (patient Yaw Limb / comfort care)      Past Medical History:   Diagnosis Date    Diastolic congestive heart failure (HCC)     DVT (deep venous thrombosis) (HCC)     History of transcatheter aortic valve replacement (TAVR)     MI (myocardial infarction) ([facility] [address] )     Parkinson's disease (HCC)     Paroxysmal atrial fibrillation (HCC)     UTI (urinary tract infection)      Past Surgical History:   Procedure Laterality Date    BREAST SURGERY      CARDIAC SURGERY      HYSTERECTOMY      REPLACEMENT AORTIC VALVE TRANSCATHETER (TAVR)      TUBAL LIGATION       Social History     Socioeconomic History    Marital status:      Spouse name: Not on file    Number of children: Not on file    Years of education: Not on file    Highest education level: Not on file   Occupational History    Not on file   Tobacco Use    Smoking status: Never Smoker    Smokeless tobacco: Never Used   Vaping Use    Vaping Use: Never used   Substance and Sexual Activity    Alcohol use: Not Currently    Drug use: Not Currently    Sexual activity: Not Currently   Other Topics Concern    Not on file   Social History Narrative    Not on file     Social Determinants of Health     Financial Resource Strain:     Difficulty of Paying Living Expenses:    Food Insecurity:     Worried About Running Out of Food in the Last Year:     [address] in the Last Year:    Transportation Needs: No Transportation Needs    Lack of Transportation (Medical): No    Lack of Transportation (Non-Medical):  No   Physical Activity:     Days of Exercise per Week:     Minutes of Exercise per Session:    Stress:     Feeling of Stress :    Social Connections:     Frequency of Communication with Friends and Family:     Frequency of Social Gatherings with Friends and Family:     Attends Hindu Services:     Active Member of Clubs or Organizations:     Attends Club or Organization Meetings:     Marital Status:    Intimate Partner Violence:     Fear of Current or Ex-Partner:     Emotionally Abused:     Physically Abused:     Sexually Abused:      Family History   Problem Relation Age of Onset    Cancer Mother     Alzheimer's disease Mother     Heart disease Father        MEDICATIONS / ALLERGIES:  all current active meds have been reviewed and current meds:   Current Facility-Administered Medications   Medication Dose Route Frequency    acetaminophen (TYLENOL) tablet 650 mg  650 mg Oral Q6H PRN    bisacodyl (DULCOLAX) rectal suppository 10 mg  10 mg Rectal Daily PRN    carbidopa-levodopa (SINEMET CR)  mg per ER tablet 1 tablet  1 tablet Oral 5x Daily    LORazepam (ATIVAN) injection 0 25 mg  0 25 mg Intravenous Q8H PRN    LORazepam (ATIVAN) tablet 0 25 mg  0 25 mg Oral TID    metoclopramide (REGLAN) injection 10 mg  10 mg Intravenous Q6H PRN    metoprolol tartrate (LOPRESSOR) tablet 25 mg  25 mg Oral Q12H SWATI    mirtazapine (REMERON) tablet 7 5 mg  7 5 mg Oral HS    ondansetron (ZOFRAN) injection 4 mg  4 mg Intravenous TID AC    pantoprazole (PROTONIX) EC tablet 40 mg  40 mg Oral Early Morning    polyethylene glycol (MIRALAX) packet 17 g  17 g Oral Daily PRN    saliva substitute (MOUTH KOTE) mucosal solution 5 spray  5 spray Mouth/Throat 4x Daily PRN    trimethobenzamide (TIGAN) IM injection 200 mg  200 mg Intramuscular Q8H PRN       Allergies   Allergen Reactions    Amoxicillin Diarrhea    Ciprofloxacin Vomiting    Erythromycin Diarrhea    Plavix [Clopidogrel] GI Intolerance       OBJECTIVE:  /61   Pulse 69   Temp 98 6 °F (37 °C)   Resp 18   Ht 5' 5" (1 651 m) Comment: per records  SpO2 96%   BMI 16 31 kg/m²   Nursing notes reviewed  Physical Exam  Vitals reviewed  Constitutional:       General: She is sleeping  She is not in acute distress  Appearance: She is ill-appearing (chronically)  Comments: Debilitated  HENT:      Head: Normocephalic and atraumatic        Right Ear: External ear normal       Left Ear: External ear normal    Eyes:      General:         Right eye: No discharge  Left eye: No discharge  Pulmonary:      Effort: Pulmonary effort is normal  No respiratory distress  Skin:     General: Skin is dry  Coloration: Skin is pale  Psychiatric:      Comments: Patient sleeping / unable to assess  Lab Results: I have personally reviewed pertinent labs  Imaging Studies: I have personally reviewed pertinent reports  EKG, Pathology, and Other Studies: I have personally reviewed pertinent reports  Counseling / Coordination of Care: Total floor / unit time spent today 20 minutes  Greater than 50% of total time was spent with the patient and / or family counseling and / or coordination of care  A description of the counseling / coordination of care: symptom assessment and management, medication review and adjustment, chart review, imaging review and lab review  Kalani Robles MD  [facility] Palliative and Supportive Care      Portions of this document may have been created using dictation software and as such some "sound alike" terms may have been generated by the system  Do not hesitate to contact me with any questions or clarifications

## 2021-09-13 NOTE — ASSESSMENT & PLAN NOTE
· Patient initially presented with intractable nausea and vomiting; ultrasound right upper quadrant demonstrated gallbladder sludge  · CT abdomen showing distended gallbladder with mild surrounding inflammatory changes  · HIDA scan and showing patent cystic duct  · Prior to transfer, general surgery consulted and feel less likely acute cholecystitis  · GI performed EGD showing large sliding hiatal hernia without Braden lesions and nonobstructing esophageal ring in the lower 3rd of the esophagus  · Patient reports ongoing nausea and overall poor appetite  Only able to take spoonfuls of food  Already on Remeron  Continue zofran before meals  Patient also requesting to hold safinamide as she believes this is contributing  If no improvement, no need to escalate care   · Palliative care consult for symptom management given wishes for transition to hospice  · - seen this am however pt was sleeping left to sleep per palliative  · - However on my visit pt reports vomiting yellow fluids last night (suspect bile) - as pt not eating   · - she is agreeable to ice chips and ice chips in water with mouth swab    · - discussed this episode with palliative dr Altagracia Doshi , see palliative note with addition of ativan for anxiety some medication adjustements made

## 2021-09-14 VITALS
DIASTOLIC BLOOD PRESSURE: 61 MMHG | BODY MASS INDEX: 16.31 KG/M2 | RESPIRATION RATE: 18 BRPM | HEIGHT: 65 IN | OXYGEN SATURATION: 96 % | TEMPERATURE: 98.6 F | SYSTOLIC BLOOD PRESSURE: 112 MMHG | HEART RATE: 60 BPM

## 2021-09-14 PROCEDURE — 99232 SBSQ HOSP IP/OBS MODERATE 35: CPT | Performed by: INTERNAL MEDICINE

## 2021-09-14 PROCEDURE — 99239 HOSP IP/OBS DSCHRG MGMT >30: CPT | Performed by: NURSE PRACTITIONER

## 2021-09-14 RX ORDER — LORAZEPAM 0.5 MG/1
0.25 TABLET ORAL 3 TIMES DAILY
Qty: 30 TABLET | Refills: 0 | Status: SHIPPED | OUTPATIENT
Start: 2021-09-14 | End: 2021-10-04

## 2021-09-14 RX ORDER — ONDANSETRON 4 MG/1
4 TABLET, ORALLY DISINTEGRATING ORAL EVERY 6 HOURS PRN
Qty: 20 TABLET | Refills: 0 | Status: SHIPPED | OUTPATIENT
Start: 2021-09-14

## 2021-09-14 RX ORDER — MORPHINE SULFATE 100 MG/5ML
10 SOLUTION, CONCENTRATE ORAL EVERY 2 HOUR PRN
Qty: 30 ML | Refills: 0 | Status: SHIPPED | OUTPATIENT
Start: 2021-09-14 | End: 2021-09-24

## 2021-09-14 RX ADMIN — CARBIDOPA AND LEVODOPA 1 TABLET: 50; 200 TABLET, EXTENDED RELEASE ORAL at 11:39

## 2021-09-14 RX ADMIN — CARBIDOPA AND LEVODOPA 1 TABLET: 50; 200 TABLET, EXTENDED RELEASE ORAL at 00:54

## 2021-09-14 RX ADMIN — ACETAMINOPHEN 650 MG: 325 TABLET, FILM COATED ORAL at 05:55

## 2021-09-14 RX ADMIN — PANTOPRAZOLE SODIUM 40 MG: 20 TABLET, DELAYED RELEASE ORAL at 05:55

## 2021-09-14 RX ADMIN — LORAZEPAM 0.25 MG: 0.5 TABLET ORAL at 09:53

## 2021-09-14 RX ADMIN — CARBIDOPA AND LEVODOPA 1 TABLET: 50; 200 TABLET, EXTENDED RELEASE ORAL at 05:55

## 2021-09-14 RX ADMIN — ONDANSETRON 4 MG: 2 INJECTION INTRAMUSCULAR; INTRAVENOUS at 11:39

## 2021-09-14 RX ADMIN — ONDANSETRON 4 MG: 2 INJECTION INTRAMUSCULAR; INTRAVENOUS at 05:55

## 2021-09-14 NOTE — PROGRESS NOTES
Progress note - Palliative and Supportive Care   Fatoumata Gibbs 80 y o  female [id number]    Patient Active Problem List   Diagnosis    Acute on chronic anemia    History of transcatheter aortic valve replacement (TAVR)    Hyponatremia    Acute cystitis without hematuria    Parkinson's disease     Hiatal hernia    Mild protein-calorie malnutrition (HCC)    Thrombocytopenia (HCC)    Severe protein-calorie malnutrition    Constipation    New onset atrial fibrillation (HCC)    Chronic diastolic CHF    Paroxysmal atrial fibrillation     Intractable nausea and vomiting    Pancytopenia    Hemolytic anemia (HCC)    Acute cholecystitis    Gallbladder sludge    Goals of care, counseling/discussion     Active issues specifically addressed today include:   Parkinsons' disease  TAVR  Severe PCM  dCHF  Goals of care    Plan:  1  Symptom management -    - Ativan 0 25mg TID scheduled with PRN IV bolus backup   - Agree with scheduled zofran - would avoid antidopaminergic medications 2/2 Parkinson's diagnosis   - Will work to arrange comfort meds for hospice d/c pending selection of a pharmacy  2  Goals - home today w/ hospice         Interval history:        Patient seen sitting up in bed today  Denies pain  Denies anxiety  Has been taking her ativan and feels this is helpful  She is aware of hospice d/c plan and asks appropriate questions  Biggest symptom today continues to be nausea    Zofran is helpful but she is having trouble taking PO      MEDICATIONS / ALLERGIES:     all current active meds have been reviewed and current meds:   Current Facility-Administered Medications   Medication Dose Route Frequency    acetaminophen (TYLENOL) tablet 650 mg  650 mg Oral Q6H PRN    bisacodyl (DULCOLAX) rectal suppository 10 mg  10 mg Rectal Daily PRN    carbidopa-levodopa (SINEMET CR)  mg per ER tablet 1 tablet  1 tablet Oral 5x Daily    LORazepam (ATIVAN) injection 0 25 mg  0 25 mg Intravenous Q8H PRN  LORazepam (ATIVAN) tablet 0 25 mg  0 25 mg Oral TID    metoclopramide (REGLAN) injection 10 mg  10 mg Intravenous Q6H PRN    metoprolol tartrate (LOPRESSOR) tablet 25 mg  25 mg Oral Q12H [facility]    mirtazapine (REMERON) tablet 7 5 mg  7 5 mg Oral HS    ondansetron (ZOFRAN) injection 4 mg  4 mg Intravenous TID AC    pantoprazole (PROTONIX) EC tablet 40 mg  40 mg Oral Early Morning    polyethylene glycol (MIRALAX) packet 17 g  17 g Oral Daily PRN    saliva substitute (MOUTH KOTE) mucosal solution 5 spray  5 spray Mouth/Throat 4x Daily PRN    trimethobenzamide (TIGAN) IM injection 200 mg  200 mg Intramuscular Q8H PRN       Allergies   Allergen Reactions    Amoxicillin Diarrhea    Ciprofloxacin Vomiting    Erythromycin Diarrhea    Plavix [Clopidogrel] GI Intolerance       OBJECTIVE:    Physical Exam  Physical Exam  Vitals and nursing note reviewed  Constitutional:       General: She is not in acute distress  Appearance: She is ill-appearing  HENT:      Head: Atraumatic  Right Ear: External ear normal       Left Ear: External ear normal       Nose: Nose normal    Eyes:      General:         Right eye: No discharge  Left eye: No discharge  Pulmonary:      Effort: Pulmonary effort is normal  No respiratory distress  Musculoskeletal:         General: Deformity (diffuse muscle wasting) present  No swelling  Neurological:      Mental Status: She is alert and oriented to person, place, and time  Cranial Nerves: No cranial nerve deficit  Psychiatric:         Mood and Affect: Mood normal          Behavior: Behavior normal          Lab Results: I have personally reviewed pertinent labs  , CBC: No results found for: WBC, HGB, HCT, MCV, PLT, ADJUSTEDWBC, MCH, MCHC, RDW, MPV, NRBC, CMP: No results found for: SODIUM, K, CL, CO2, ANIONGAP, BUN, CREATININE, GLUCOSE, CALCIUM, AST, ALT, ALKPHOS, PROT, BILITOT, EGFR, BMP:No results found for: SODIUM, K, CL, CO2, BUN, CREATININE, GLUC, GLUF, CALCIUM, AGAP, EGFR    Counseling / Coordination of Care    Total floor / unit time spent today 25+ minutes  Greater than 50% of total time was spent with the patient and / or family counseling and / or coordination of care   A description of the counseling / coordination of care: patient assessmnet, symptoma ssessment, med review, med changes, goals of care, support

## 2021-09-14 NOTE — UTILIZATION REVIEW
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  Assessment/Plan:   Pt does not want any more work up  Has ongoing nausea with poor appetite  She does not want bone marrow biopsy, feeding tube  Consult to Palliative Care for symptom mgmt  Pt was changed to comfort care on 9/12 9/13 Palliative Care Consult - pt request for hospice care, comfort care  Will change meds  Scheduled IV Zofran TID AC, PRN Reglan IV q 6 hr PRN when not improved with Zofran  Will start Lorazepam 0 25 mg q 8 hr scheduled with IV Lorazepam PRN if vomiting with plan to increase Lorazepam in coming days for increased anxiousness  Tigan IV PRN if all else fails  Will stop Xanax  Goals of care are Comfort care with hospice referral ordered  May go to SNF or home, undecided yet  Vital Signs:   09/13/21 0907  --  69  --  113/61  --  --  --   09/13/21 07:30:51  98 6 °F (37 °C)  60  18  115/63  80  96 %  --   09/12/21 09:32:53  --  69  --  115/64  81  --  --   09/12/21 0920  --  --  --  --  --  --  None (Room air)   09/12/21 08:48:25  98 2 °F (36 8 °C)  --  18  115/57  76  --  None (Room air)     Pertinent Labs/Diagnostic Results:     9/9 NM Hepatobiliary scan - 1  Gallbladder filling compatible with a patent cystic duct  The gallbladder is noted to be markedly enlarged as seen on CT        Results from last 7 days   Lab Units 09/12/21  0638 09/11/21  0541 09/10/21  0556 09/09/21  0513 09/08/21  1412   WBC Thousand/uL 2 73* 2 79* 2 94* 3 24* 8 81   HEMOGLOBIN g/dL 7 2* 7 8* 9 0* 7 7* 8 0*   HEMATOCRIT % 22 7* 24 6* 28 6* 23 5* 25 0*   PLATELETS Thousands/uL 75* 75* 80* 64* 63*   NEUTROS ABS Thousands/µL 2 14  --   --   --  7 81*   BANDS PCT %  --   --   --  1  -- Results from last 7 days   Lab Units 09/12/21  0638 09/11/21  0541 09/10/21  0556 09/09/21  0513 09/08/21  0504   SODIUM mmol/L 136 137 136 138 137   POTASSIUM mmol/L 3 5 3 4* 3 6 3 3* 3 4*   CHLORIDE mmol/L 103 101 101 101 101   CO2 mmol/L 28 31 28 30 28   ANION GAP mmol/L 5 5 7 7 8   BUN mg/dL 16 18 18 16 17   CREATININE mg/dL 0 46* 0 50* 0 57* 0 63 0 62   EGFR ml/min/1 73sq m 90 88 84 81 82   CALCIUM mg/dL 8 0* 8 2* 8 6 8 0* 8 1*   MAGNESIUM mg/dL  --   --  2 0  --   --    PHOSPHORUS mg/dL  --   --  2 9  --   --      Results from last 7 days   Lab Units 09/12/21  0638 09/11/21  0541 09/10/21  0556 09/09/21  0513 09/08/21  0504   AST U/L 50* 49* 58* 45 52*   ALT U/L 8* 10* 10* 10* <6*   ALK PHOS U/L 115 130* 157* 112 113   TOTAL PROTEIN g/dL 5 2* 5 5* 6 2* 5 1* 4 9*   ALBUMIN g/dL 2 8* 3 0* 3 3* 2 9* 2 8*   TOTAL BILIRUBIN mg/dL 1 52* 1 68* 2 11* 1 56* 1 82*   BILIRUBIN DIRECT mg/dL  --   --   --   --  0 48*         Results from last 7 days   Lab Units 09/12/21  0638 09/11/21  0541 09/10/21  0556 09/09/21  0513 09/08/21  0504   GLUCOSE RANDOM mg/dL 57* 65 51* 70 68     Results from last 7 days   Lab Units 09/09/21  0513 09/08/21  1357 09/07/21  1216   D-DIMER QUANTITATIVE ug/ml FEU 2 77* 2 46* 1 12*     Results from last 7 days   Lab Units 09/09/21  0513 09/08/21  1357 09/08/21  0857   PROTIME seconds 14 1 13 2 14 2   INR  1 10 1 01 1 11   PTT seconds 29 28 29     Results from last 7 days   Lab Units 09/08/21  0950   CLARITY UA  Clear   COLOR UA  Dk Yellow   SPEC GRAV UA  1 020   PH UA  6 5   GLUCOSE UA mg/dl Negative   KETONES UA mg/dl 40 (2+)*   BLOOD UA  Trace-Intact*   PROTEIN UA mg/dl Trace*   NITRITE UA  Negative   BILIRUBIN UA  Small*   UROBILINOGEN UA E U /dl 4 0*   LEUKOCYTES UA  Trace*   WBC UA /hpf 2-4   RBC UA /hpf 1-2   BACTERIA UA /hpf Moderate*   EPITHELIAL CELLS WET PREP /hpf Occasional   MUCUS THREADS  Occasional*     Medications:   Scheduled Medications:  carbidopa-levodopa, 1 tablet, Oral, 5x Daily  LORazepam, 0 25 mg, Oral, TID  metoprolol tartrate, 25 mg, Oral, Q12H SWATI  mirtazapine, 7 5 mg, Oral, HS  ondansetron, 4 mg, Intravenous, TID AC  pantoprazole, 40 mg, Oral, Early Morning      Continuous IV Infusions:     PRN Meds:  acetaminophen, 650 mg, Oral, Q6H PRN - x 1 9/12  Alprazolam 0 25 mg PO TID - x 1 9/12, 9/13 and d/c   bisacodyl, 10 mg, Rectal, Daily PRN  LORazepam, 0 25 mg, Intravenous, Q8H PRN  metoclopramide, 10 mg, Intravenous, Q6H PRN  polyethylene glycol, 17 g, Oral, Daily PRN  saliva substitute, 5 spray, Mouth/Throat, 4x Daily PRN  trimethobenzamide, 200 mg, Intramuscular, Q8H PRN    Discharge Plan: home on home hospice with 24/7 care    Network Utilization Review Department  ATTENTION: Please call with any questions or concerns to [phone] and carefully listen to the prompts so that you are directed to the right person  All voicemails are confidential   Sushil Cruz all requests for admission clinical reviews, approved or denied determinations and any other requests to dedicated fax number below belonging to the campus where the patient is receiving treatment   List of dedicated fax numbers for the Facilities:  [address] DENIALS (Administrative/Medical Necessity) [phone]   [address] (Maternity/NICU/Pediatrics) [phone]   [address]  [address]eline[address] Jw [address] 2460 [id number] [place] [address] [address] [phone]

## 2021-09-14 NOTE — CASE MANAGEMENT
VM son Josue Noble, request call back    TCT son, would like referral to [facility]  Would like pt to be discharged today  Advised we have an accepting hospice agency  [facility] was highly recommended  Advised I am unsure of equipment needs and delivery  States they can make her comfortable at home in her bed    PT address:  [address]    States 1 hour 45 minute drive  Aware it may be difficult to arrange ambulance for transport today  States he feels they can take her in their car    Referral entered in Ana to [facility]    9:20-TCT [facility]  Message left for nurse to return call    9:30-TCT [facility], spoke with Valeria Baum  Requests clinical be faxed for review    [phone], same completed

## 2021-09-14 NOTE — CASE MANAGEMENT
YON Grant, Hospice, they will be delivering DME this evening to patients home  Nurse will be out to see patient 10AM tomorrow  She emailed son list of private caregivers      Son will obtain meds from [facility]

## 2021-09-14 NOTE — PHYSICAL THERAPY NOTE
Physical Therapy Cancellation Note    Patient's Name: Rita Calderón       09/14/21 1312   PT Last Visit   PT Visit Date 09/14/21   Note Type   Cancel Reasons Other  (Per CM note pt to d/c home today, hospice eval tomorrow )     Discontinued PT orders  Per Case Management note pt to d/c home today on home hospice      Helen Ma, PT, DPT

## 2021-09-14 NOTE — PLAN OF CARE
Problem: Prexisting or High Potential for Compromised Skin Integrity  Goal: Skin integrity is maintained or improved  Description: INTERVENTIONS:  - Identify patients at risk for skin breakdown  - Assess and monitor skin integrity  - Assess and monitor nutrition and hydration status  - Monitor labs   - Assess for incontinence   - Turn and reposition patient  - Assist with mobility/ambulation  - Relieve pressure over bony prominences  - Avoid friction and shearing  - Provide appropriate hygiene as needed including keeping skin clean and dry  - Evaluate need for skin moisturizer/barrier cream  - Collaborate with interdisciplinary team   - Patient/family teaching  - Consider wound care consult   Outcome: Adequate for Discharge     Problem: MOBILITY - ADULT  Goal: Maintain or return to baseline ADL function  Description: INTERVENTIONS:  -  Assess patient's ability to carry out ADLs; assess patient's baseline for ADL function and identify physical deficits which impact ability to perform ADLs (bathing, care of mouth/teeth, toileting, grooming, dressing, etc )  - Assess/evaluate cause of self-care deficits   - Assess range of motion  - Assess patient's mobility; develop plan if impaired  - Assess patient's need for assistive devices and provide as appropriate  - Encourage maximum independence but intervene and supervise when necessary  - Involve family in performance of ADLs  - Assess for home care needs following discharge   - Consider OT consult to assist with ADL evaluation and planning for discharge  - Provide patient education as appropriate  Outcome: Adequate for Discharge  Goal: Maintains/Returns to pre admission functional level  Description: INTERVENTIONS:  - Perform BMAT or MOVE assessment daily    - Set and communicate daily mobility goal to care team and patient/family/caregiver     - Collaborate with rehabilitation services on mobility goals if consulted  - Perform Range of Motion   - Reposition patient   - Dangle patient  - Stand adal  - Ambulate patie  - Out of bed to chair  - Out of bed for meals  - Out of bed for toileting  - Record patient progress and toleration of activity level   Outcome: Adequate for Discharge     Problem: PAIN - ADULT  Goal: Verbalizes/displays adequate comfort level or baseline comfort level  Description: Interventions:  - Encourage patient to monitor pain and request assistance  - Assess pain using appropriate pain scale  - Administer analgesics based on type and severity of pain and evaluate response  - Implement non-pharmacological measures as appropriate and evaluate response  - Consider cultural and social influences on pain and pain management  - Notify physician/advanced practitioner if interventions unsuccessful or patient reports new pain  Outcome: Adequate for Discharge     Problem: INFECTION - ADULT  Goal: Absence or prevention of progression during hospitalization  Description: INTERVENTIONS:  - Assess and monitor for signs and symptoms of infection  - Monitor lab/diagnostic results  - Monitor all insertion sites, i e  indwelling lines, tubes, and drains  - Monitor endotracheal if appropriate and nasal secretions for changes in amount and color  - [place] appropriate cooling/warming therapies per order  - Administer medications as ordered  - Instruct and encourage patient and family to use good hand hygiene technique  - Identify and instruct in appropriate isolation precautions for identified infection/condition  Outcome: Adequate for Discharge  Goal: Absence of fever/infection during neutropenic period  Description: INTERVENTIONS:  - Monitor WBC    Outcome: Adequate for Discharge     Problem: SAFETY ADULT  Goal: Maintain or return to baseline ADL function  Description: INTERVENTIONS:  -  Assess patient's ability to carry out ADLs; assess patient's baseline for ADL function and identify physical deficits which impact ability to perform ADLs (bathing, care of mouth/teeth, toileting, grooming, dressing, etc )  - Assess/evaluate cause of self-care deficits   - Assess range of motion  - Assess patient's mobility; develop plan if impaired  - Assess patient's need for assistive devices and provide as appropriate  - Encourage maximum independence but intervene and supervise when necessary  - Involve family in performance of ADLs  - Assess for home care needs following discharge   - Consider OT consult to assist with ADL evaluation and planning for discharge  - Provide patient education as appropriate  Outcome: Adequate for Discharge  Goal: Maintains/Returns to pre admission functional level  Description: INTERVENTIONS:  - Perform BMAT or MOVE assessment daily    - Set and communicate daily mobility goal to care team and patient/family/caregiver     - Collaborate with rehabilitation services on mobility goals if consulted  - Perform Range of [place]  - Reposition patient  - Dangle adla  - Stand patie  - Ambulate patien  - Out of bed to jenn  - Out of bed for meals  - Out of bed for toileting  - Record patient progress and toleration of activity level   Outcome: Adequate for Discharge  Goal: Patient will remain free of falls  Description: INTERVENTIONS:  - Educate patient/family on patient safety including physical limitations  - Instruct patient to call for assistance with activity   - Consult OT/PT to assist with strengthening/mobility   - Keep Call bell within reach  - Keep bed low and locked with side rails adjusted as appropriate  - Keep care items and personal belongings within reach  - Initiate and maintain comfort rounds  - Make Fall Risk Sign visible to staff  - Obtain necessary fall risk management equi  - Apply yellow socks and bracelet for high fall risk patients  - Consider moving patient to room near nurses station  Outcome: Adequate for Discharge     Problem: DISCHARGE PLANNING  Goal: Discharge to home or other facility with appropriate resources  Description: INTERVENTIONS:  - Identify barriers to discharge w/patient and caregiver  - Arrange for needed discharge resources and transportation as appropriate  - Identify discharge learning needs (meds, wound care, etc )  - Arrange for interpretive services to assist at discharge as needed  - Refer to Case Management Department for coordinating discharge planning if the patient needs post-hospital services based on physician/advanced practitioner order or complex needs related to functional status, cognitive ability, or social support system  Outcome: Adequate for Discharge     Problem: Knowledge Deficit  Goal: Patient/family/caregiver demonstrates understanding of disease process, treatment plan, medications, and discharge instructions  Description: Complete learning assessment and assess knowledge base  Interventions:  - Provide teaching at level of understanding  - Provide teaching via preferred learning methods  Outcome: Adequate for Discharge     Problem: Potential for Falls  Goal: Patient will remain free of falls  Description: INTERVENTIONS:  - Educate patient/family on patient safety including physical limitations  - Instruct patient to call for assistance with activity   - Consult OT/PT to assist with strengthening/mobility   - Keep Call bell within reach  - Keep bed low and locked with side rails adjusted as appropriate  - Keep care items and personal belongings within reach  - Initiate and maintain comfort rounds  - Make Fall Risk Sign visible to   - Apply yellow socks and bracelet for high fall risk patients  - Consider moving patient to room near nurses station  Outcome: Adequate for Discharge     Problem: Nutrition/Hydration-ADULT  Goal: Nutrient/Hydration intake appropriate for improving, restoring or maintaining nutritional needs  Description: Monitor and assess patient's nutrition/hydration status for malnutrition  Collaborate with interdisciplinary team and initiate plan and interventions as ordered    Monitor patient's weight and dietary intake as ordered or per policy  Utilize nutrition screening tool and intervene as necessary  Determine patient's food preferences and provide high-protein, high-caloric foods as appropriate       INTERVENTIONS:  - Monitor oral intake, urinary output, labs, and treatment plans  - Assess nutrition and hydration status and recommend course of action  - Evaluate amount of meals eaten  - Assist patient with eating if necessary   - Allow adequate time for meals  - Recommend/ encourage appropriate diets, oral nutritional supplements, and vitamin/mineral supplements  - Order, calculate, and assess calorie counts as needed  - Recommend, monitor, and adjust tube feedings and TPN/PPN based on assessed needs  - Assess need for intravenous fluids  - Provide specific nutrition/hydration education as appropriate  - Include patient/family/caregiver in decisions related to nutrition  Outcome: Adequate for Discharge

## 2021-09-14 NOTE — DISCHARGE SUMMARY
Discharge Summary - [facility]    Patient Information: Fatoumata Gibbs 80 y o  female MRN: [id number]  Unit/Bed#: [facility] 913-01 Encounter: [id number]    Discharging Physician / Practitioner: JADA Win  PCP: Daisy Eason  Admission Date: 9/9/2021  Discharge Date: 09/14/21    Reason for Admission: Nausea, poor oral intake, pancytopenia, hemolytic anemia    Discharge Diagnoses:     Principal Problem:    Pancytopenia  Active Problems:    Hemolytic anemia ([address] )    Intractable nausea and vomiting    Constipation    Goals of care, counseling/discussion    Parkinson's disease     Paroxysmal atrial fibrillation     Severe protein-calorie malnutrition    History of transcatheter aortic valve replacement (TAVR)    Chronic diastolic CHF  Resolved Problems:    * No resolved hospital problems  *      Consultations During Hospital Stay:  · Hematology  · Gastroenterology  · Palliative care  · General surgery   · PT/OT  · Case management    Procedures Performed:     · None     Significant Findings / Test Results:     · RUQ US  Gallbladder sludge  Otherwise, no evidence of biliary ductal dilatation  Hepatic and right renal cysts  ·  CT C/A/P  Distended gallbladder with surrounding inflammatory changes suggesting acute cholecystitis  Bilateral adnexal cysts measuring 3 7 x 2 7 cm on the right and 1 8 x 1 8 cm on the left  Further evaluation with nonemergent ultrasound given the postmenopausal status recommended  · HIDA scan  Gallbladder filling compatible with a patent cystic duct  The gallbladder is noted to be markedly enlarged as seen on CT  Incidental Findings:   · Bilateral adnexal cysts measuring 3 7 x 2 7 cm on the right and 1 8 x 1 8 cm on the left  No further w/u given plan for hospice       Test Results Pending at Discharge (will require follow up):   · none     Outpatient Tests Requested:  · Outpatient f/u with PCP PRN  · Outpatient f/u with [facility]     Complications:  None Hospital Course:     Soy Fisher is a 80 y o  female patient with a PMHx of Parkinsons disease, PAF, diastolic chf, TAVR who originally presented to the hospital on 9/9/2021 due to nausea, vomiting and poor oral intake  Initial concern for cholecystitis however this was ruled out  Patient was found to be pancytopenic and was transferred to [facility] for hematology consult who felt etiology was likely hemolytic anemia in the setting of leaking aortic valve in combination with bone marrow failure versus malignancy  Bone marrow biopsy was recommended however patient declined and wished to proceed with conservative management given age and would not wish for cancer treatment if she were to be found to have underlying cancer  Throughout hospitalization patient continued to have very poor oral intake and no appetite  Alternative means of nutrition were discussed and patient was clear that she did not want PEG tube  Goals of care discussions were held with patient and family and comfort care/hospice was decided  Plan to transition home with family today with home hospice which has been coordinated by case management  Comfort medications were prescribed by palliative care and sent to pharmacy to go home with patient  Home hospice will see patient tomorrow  Condition at Discharge: poor     Discharge Day Visit / Exam:     Subjective:  Patient offers no new complaints  Vitals: Blood Pressure: 112/61 (09/14/21 0751)  Pulse: 60 (09/14/21 0751)  Temperature: 98 6 °F (37 °C) (09/14/21 0751)  Temp Source: Oral (09/10/21 2232)  Respirations: 18 (09/14/21 0751)  Height: 5' 5" (165 1 cm) (per records) (09/10/21 1249)  SpO2: 96 % (09/14/21 0751)  Exam:   Physical Exam  Vitals and nursing note reviewed  Constitutional:       General: She is not in acute distress  Appearance: She is cachectic  She is ill-appearing (chronically ill and frail appearing)  Cardiovascular:      Rate and Rhythm: Normal rate     Pulmonary: Breath sounds: Decreased breath sounds present  Abdominal:      Tenderness: There is no abdominal tenderness  Musculoskeletal:         General: No swelling  Comments: Kyphosis noted    Skin:     General: Skin is warm  Coloration: Skin is pale  Neurological:      Mental Status: She is alert  Mental status is at baseline  Psychiatric:         Mood and Affect: Mood normal          Discussion with Family: patient and son x 2 at bedside  Discharge instructions/Information to patient and family:   See after visit summary for information provided to patient and family  Provisions for Follow-Up Care:  See after visit summary for information related to follow-up care and any pertinent home health orders  Disposition:     Home with VNA Services (Reminder: Complete face to face encounter)    For Discharges to [facility] SNF:   · Not Applicable to this Patient - Not Applicable to this Patient    Planned Readmission: no     Discharge Statement:  I spent 40 minutes discharging the patient  This time was spent on the day of discharge  I had direct contact with the patient on the day of discharge  Greater than 50% of the total time was spent examining patient, answering all patient questions, arranging and discussing plan of care with patient as well as directly providing post-discharge instructions  Additional time then spent on discharge activities  Discharge Medications:  See after visit summary for reconciled discharge medications provided to patient and family        ** Please Note: This note has been constructed using a voice recognition system **

## 2021-09-14 NOTE — CASE MANAGEMENT
Met with pt and sons, discussed [facility] referral and clinical faxed to them    They have walker, BSC at home, bed on 1st floor  They would like pt to leave today    [facility] the [facility] in AM if family agreeable  She will talk to son, order needed equipment  They use mail order for medications  Palliative MD can order meds for home since not opening until AM   Advised family is looking for additional HHA hours  They can provide family a list of HHA agencies    Spoke with son, agreeable to Hospice opening in AM   Aware hospice will provide HHA info and will assess for HHA hours     He is agreeable to hospice opening in AM    He would like pt to be DC today and they will transport home    Dr Samantha Bhardwaj notified

## 2021-09-14 NOTE — PLAN OF CARE
Problem: Prexisting or High Potential for Compromised Skin Integrity  Goal: Skin integrity is maintained or improved  Description: INTERVENTIONS:  - Identify patients at risk for skin breakdown  - Assess and monitor skin integrity  - Assess and monitor nutrition and hydration status  - Monitor labs   - Assess for incontinence   - Turn and reposition patient  - Assist with mobility/ambulation  - Relieve pressure over bony prominences  - Avoid friction and shearing  - Provide appropriate hygiene as needed including keeping skin clean and dry  - Evaluate need for skin moisturizer/barrier cream  - Collaborate with interdisciplinary team   - Patient/family teaching  - Consider wound care consult   Outcome: Adequate for Discharge     Problem: MOBILITY - ADULT  Goal: Maintain or return to baseline ADL function  Description: INTERVENTIONS:  -  Assess patient's ability to carry out ADLs; assess patient's baseline for ADL function and identify physical deficits which impact ability to perform ADLs (bathing, care of mouth/teeth, toileting, grooming, dressing, etc )  - Assess/evaluate cause of self-care deficits   - Assess range of motion  - Assess patient's mobility; develop plan if impaired  - Assess patient's need for assistive devices and provide as appropriate  - Encourage maximum independence but intervene and supervise when necessary  - Involve family in performance of ADLs  - Assess for home care needs following discharge   - Consider OT consult to assist with ADL evaluation and planning for discharge  - Provide patient education as appropriate  Outcome: Adequate for Discharge  Goal: Maintains/Returns to pre admission functional level  Description: INTERVENTIONS:  - Perform BMAT or MOVE assessment daily    - Set and communicate daily mobility goal to care team and patient/family/caregiver     - Collaborate with rehabilitation services on mobility goals if consulted    - Record patient progress and toleration of activity level   Outcome: Adequate for Discharge     Problem: PAIN - ADULT  Goal: Verbalizes/displays adequate comfort level or baseline comfort level  Description: Interventions:  - Encourage patient to monitor pain and request assistance  - Assess pain using appropriate pain scale  - Administer analgesics based on type and severity of pain and evaluate response  - Implement non-pharmacological measures as appropriate and evaluate response  - Consider cultural and social influences on pain and pain management  - Notify physician/advanced practitioner if interventions unsuccessful or patient reports new pain  Outcome: Adequate for Discharge     Problem: INFECTION - ADULT  Goal: Absence or prevention of progression during hospitalization  Description: INTERVENTIONS:  - Assess and monitor for signs and symptoms of infection  - Monitor lab/diagnostic results  - Monitor all insertion sites, i e  indwelling lines, tubes, and drains  - Monitor endotracheal if appropriate and nasal secretions for changes in amount and color  - [place] appropriate cooling/warming therapies per order  - Administer medications as ordered  - Instruct and encourage patient and family to use good hand hygiene technique  - Identify and instruct in appropriate isolation precautions for identified infection/condition  Outcome: Adequate for Discharge  Goal: Absence of fever/infection during neutropenic period  Description: INTERVENTIONS:  - Monitor WBC    Outcome: Adequate for Discharge     Problem: SAFETY ADULT  Goal: Maintain or return to baseline ADL function  Description: INTERVENTIONS:  -  Assess patient's ability to carry out ADLs; assess patient's baseline for ADL function and identify physical deficits which impact ability to perform ADLs (bathing, care of mouth/teeth, toileting, grooming, dressing, etc )  - Assess/evaluate cause of self-care deficits   - Assess range of motion  - Assess patient's mobility; develop plan if impaired  - Assess patient's need for assistive devices and provide as appropriate  - Encourage maximum independence but intervene and supervise when necessary  - Involve family in performance of ADLs  - Assess for home care needs following discharge   - Consider OT consult to assist with ADL evaluation and planning for discharge  - Provide patient education as appropriate  Outcome: Adequate for Discharge  Goal: Maintains/Returns to pre admission functional level  Description: INTERVENTIONS:  - Perform BMAT or MOVE assessment daily    - Set and communicate daily mobility goal to care team and patient/family/caregiver     - Collaborate with rehabilitation services on mobility goals if consulted    - Out of bed for toileting  - Record patient progress and toleration of activity level   Outcome: Adequate for Discharge  Goal: Patient will remain free of falls  Description: INTERVENTIONS:  - Educate patient/family on patient safety including physical limitations  - Instruct patient to call for assistance with activity   - Consult OT/PT to assist with strengthening/mobility   - Keep Call bell within reach  - Keep bed low and locked with side rails adjusted as appropriate  - Keep care items and personal belongings within reach  - Initiate and maintain comfort rounds  - Make Fall Risk Sign visible to staff    - Apply yellow socks and bracelet for high fall risk patients  - Consider moving patient to room near nurses station  Outcome: Adequate for Discharge     Problem: DISCHARGE PLANNING  Goal: Discharge to home or other facility with appropriate resources  Description: INTERVENTIONS:  - Identify barriers to discharge w/patient and caregiver  - Arrange for needed discharge resources and transportation as appropriate  - Identify discharge learning needs (meds, wound care, etc )  - Arrange for interpretive services to assist at discharge as needed  - Refer to Case Management Department for coordinating discharge planning if the patient needs post-hospital services based on physician/advanced practitioner order or complex needs related to functional status, cognitive ability, or social support system  Outcome: Adequate for Discharge     Problem: Knowledge Deficit  Goal: Patient/family/caregiver demonstrates understanding of disease process, treatment plan, medications, and discharge instructions  Description: Complete learning assessment and assess knowledge base  Interventions:  - Provide teaching at level of understanding  - Provide teaching via preferred learning methods  Outcome: Adequate for Discharge     Problem: Potential for Falls  Goal: Patient will remain free of falls  Description: INTERVENTIONS:  - Educate patient/family on patient safety including physical limitations  - Instruct patient to call for assistance with activity   - Consult OT/PT to assist with strengthening/mobility   - Keep Call bell within reach  - Keep bed low and locked with side rails adjusted as appropriate  - Keep care items and personal belongings within reach  - Initiate and maintain comfort rounds  - Make Fall Risk Sign visible to staff    - Apply yellow socks and bracelet for high fall risk patients  - Consider moving patient to room near nurses station  Outcome: Adequate for Discharge     Problem: Nutrition/Hydration-ADULT  Goal: Nutrient/Hydration intake appropriate for improving, restoring or maintaining nutritional needs  Description: Monitor and assess patient's nutrition/hydration status for malnutrition  Collaborate with interdisciplinary team and initiate plan and interventions as ordered  Monitor patient's weight and dietary intake as ordered or per policy  Utilize nutrition screening tool and intervene as necessary  Determine patient's food preferences and provide high-protein, high-caloric foods as appropriate       INTERVENTIONS:  - Monitor oral intake, urinary output, labs, and treatment plans  - Assess nutrition and hydration status and recommend course of action  - Evaluate amount of meals eaten  - Assist patient with eating if necessary   - Allow adequate time for meals  - Recommend/ encourage appropriate diets, oral nutritional supplements, and vitamin/mineral supplements  - Order, calculate, and assess calorie counts as needed  - Recommend, monitor, and adjust tube feedings and TPN/PPN based on assessed needs  - Assess need for intravenous fluids  - Provide specific nutrition/hydration education as appropriate  - Include patient/family/caregiver in decisions related to nutrition  Outcome: Adequate for Discharge

## 2021-09-15 LAB
COPPER SERPL-MCNC: 89 UG/DL (ref 80–158)
ZINC SERPL-MCNC: 70 UG/DL (ref 44–115)

## 2021-09-15 NOTE — UTILIZATION REVIEW
Notification of Discharge   This is a Notification of Discharge from our facility [address]  Please be advised that this patient has been discharge from our facility  Below you will find the admission and discharge date and time including the patients disposition  UTILIZATION REVIEW CONTACT:  Anabell Room  Utilization   Network Utilization Review Department  Phone: [phone] x carefully listen to the prompts  All voicemails are confidential   Email: [email or web address]     PHYSICIAN ADVISORY SERVICES:  FOR [id number] REVIEW - MEDICAL NECESSITY DENIAL  Phone: [phone]  Fax: [phone]  Email: [email or web address]     PRESENTATION DATE: 9/9/2021  9:43 PM  OBERVATION ADMISSION DATE:  INPATIENT ADMISSION DATE: 9/9/21  9:43 PM   DISCHARGE DATE: 9/14/2021 12:30 PM  DISPOSITION: Home with [place] with Home Health Care      IMPORTANT INFORMATION:  Send all requests for admission clinical reviews, approved or denied determinations and any other requests to dedicated fax number below belonging to the campus where the patient is receiving treatment   List of dedicated fax numbers:  [address] DENIALS (Administrative/Medical Necessity) [phone]   [address] (Maternity/NICU/Pediatrics) [phone]   Tho Seen [phone] 130 [facility] [phone]   [address] [place] [address] [phone]   [address] [phone]   [facility]  [phone]   [address] [facility]  [address] [facility] [address] [facility] [phone]

## 2021-09-24 NOTE — ASSESSMENT & PLAN NOTE
· Patient expresses wishes for hospice cares  Does not want BM biopsy as would not   · Does not want feeding tube or alternative means of nutrition  · Discussed with sons x 2 and granddaughter at bedside today  Additional family members to come this afternoon and will re-discuss plan for hospice  · Palliative consult for symptom management  · CM consult  · SNF versus home, family to decide  ,

## 2024-12-23 NOTE — EMTALA/ACUTE CARE TRANSFER
[facility]  [address]  [facility] [address]  Dept: [phone]      ACUTE CARE TRANSFER CONSENT    NAME Contreras Banks                                         1934                              MRN [id number]    I have been informed of my rights regarding examination, treatment, and transfer   by Dr Myesha Jimenez MD    Benefits:      Risks: Potential for delay in receiving treatment, Potential deterioration of medical condition, Loss of IV, Increased discomfort during transfer, Possible worsening of condition or death during transfer      Consent for Transfer:  I acknowledge that my medical condition has been evaluated and explained to me by the treating physician or other qualified medical person and/or my attending physician, who has recommended that I be transferred to the service of  Accepting Physician: Dr Jamia Wynne at [address] [place] [address] Name, [facility] : [address] [facility]  The above potential benefits of such transfer, the potential risks associated with such transfer, and the probable risks of not being transferred have been explained to me, and I fully understand them  The doctor has explained that, in my case, the benefits of transfer outweigh the risks  I agree to be transferred  I authorize the performance of emergency medical procedures and treatments upon me in both transit and upon arrival at the receiving facility  Additionally, I authorize the release of any and all medical records to the receiving facility and request they be transported with me, if possible  I understand that the safest mode of transportation during a medical emergency is an ambulance and that the Hospital advocates the use of this mode of transport   Risks of traveling to the receiving facility by car, including absence of medical control, life sustaining equipment, such as oxygen, and medical personnel has been explained to me and I fully Spoke with patient, she states she was told to follow-up with Dr. Mcmullen in 2 weeks. Scheduled patient with Dr. Mcmullen on Monday 1/6/25 at 145pm, DG office.    Closing encounter.   understand them  (GARY CORRECT BOX BELOW)  [  ]  I consent to the stated transfer and to be transported by ambulance/helicopter  [  ]  I consent to the stated transfer, but refuse transportation by ambulance and accept full responsibility for my transportation by car  I understand the risks of non-ambulance transfers and I exonerate the Hospital and its staff from any deterioration in my condition that results from this refusal     X___________________________________________    DATE  21  TIME________  Signature of patient or legally responsible individual signing on patient behalf           RELATIONSHIP TO PATIENT_________________________          Provider Certification    NAME Nickie Mauricio                                         1934                              MRN [id number]    A medical screening exam was performed on the above named patient  Based on the examination:    Condition Necessitating Transfer hemolytic anemia dic - hematology evaluation management     Patient Condition: The patient has been stabilized such that within reasonable medical probability, no material deterioration of the patient condition or the condition of the unborn child(dustin) is likely to result from the transfer    Reason for Transfer: No bed available at level of patient's needs    Transfer Requirements: [address] [facility],[address] [facility]   · Space available and qualified personnel available for treatment as acknowledged by Elzbieta Randle  · Agreed to accept transfer and to provide appropriate medical treatment as acknowledged by       Dr Jelly Muller  · Appropriate medical records of the examination and treatment of the patient are provided at the time of transfer   [address], [address] _______  · Transfer will be performed by qualified personnel from    and appropriate transfer equipment as required, including the use of necessary and appropriate life support measures      Provider Certification: I have examined the patient and explained the following risks and benefits of being transferred/refusing transfer to the patient/family:  General risk, such as traffic hazards, adverse weather conditions, rough terrain or turbulence, possible failure of equipment (including vehicle or aircraft), or consequences of actions of persons outside the control of the transport personnel, Risk of worsening condition, Unanticipated needs of medical equipment and personnel during transport, The possibility of a transport vehicle being unavailable      Based on these reasonable risks and benefits to the patient and/or the unborn child(dustin), and based upon the information available at the time of the patients examination, I certify that the medical benefits reasonably to be expected from the provision of appropriate medical treatments at another medical facility outweigh the increasing risks, if any, to the individuals medical condition, and in the case of labor to the unborn child, from effecting the transfer      X____________________________________________ DATE 09/08/21        TIME_______      ORIGINAL - SEND TO MEDICAL RECORDS   COPY - SEND WITH PATIENT DURING TRANSFER